# Patient Record
Sex: FEMALE | Race: WHITE | NOT HISPANIC OR LATINO | Employment: OTHER
[De-identification: names, ages, dates, MRNs, and addresses within clinical notes are randomized per-mention and may not be internally consistent; named-entity substitution may affect disease eponyms.]

---

## 2017-09-01 ENCOUNTER — HEALTH MAINTENANCE LETTER (OUTPATIENT)
Age: 72
End: 2017-09-01

## 2017-10-19 ENCOUNTER — OFFICE VISIT (OUTPATIENT)
Dept: CARDIOLOGY | Facility: CLINIC | Age: 72
End: 2017-10-19
Attending: INTERNAL MEDICINE
Payer: COMMERCIAL

## 2017-10-19 VITALS
HEIGHT: 61 IN | DIASTOLIC BLOOD PRESSURE: 80 MMHG | HEART RATE: 76 BPM | BODY MASS INDEX: 25.81 KG/M2 | WEIGHT: 136.7 LBS | SYSTOLIC BLOOD PRESSURE: 130 MMHG

## 2017-10-19 DIAGNOSIS — E78.2 MIXED HYPERLIPIDEMIA: ICD-10-CM

## 2017-10-19 LAB
ALT SERPL W P-5'-P-CCNC: <5 U/L (ref 5–30)
CHOLEST SERPL-MCNC: 198 MG/DL
HDLC SERPL-MCNC: 59 MG/DL
LDLC SERPL CALC-MCNC: 91 MG/DL
NONHDLC SERPL-MCNC: 139 MG/DL
TRIGL SERPL-MCNC: 238 MG/DL

## 2017-10-19 PROCEDURE — 84460 ALANINE AMINO (ALT) (SGPT): CPT | Performed by: INTERNAL MEDICINE

## 2017-10-19 PROCEDURE — 36415 COLL VENOUS BLD VENIPUNCTURE: CPT | Performed by: INTERNAL MEDICINE

## 2017-10-19 PROCEDURE — 99213 OFFICE O/P EST LOW 20 MIN: CPT | Performed by: INTERNAL MEDICINE

## 2017-10-19 PROCEDURE — 80061 LIPID PANEL: CPT | Performed by: INTERNAL MEDICINE

## 2017-10-19 NOTE — LETTER
10/19/2017    Meredith Silva MD  [address] [facility] [address]  [facility] [address]    RE: Sapna Mckeonmercy       Dear Colleague,    I had the pleasure of seeing Sapna Ibrahim in the [facility].    It was a pleasure to see Sapna Ibrahim in cardiology clinic.  She is a very pleasant, 72-year-old woman who I met in 10/2016 with hyperlipidemia.  Her LDL cholesterol was as high as 250 but had not been on cholesterol medication.  After I saw her, she agreed to start Crestor 20 mg a day, and fortunately her blood cholesterol has come down really nicely.  Her LDL when checked today was 91 with an HDL of 59.  She does have a little bit of hypertriglyceridemia at 238.  She is otherwise very active.  She still walks about 3 miles per day and has no symptoms of chest pain, tightness or shortness of breath.  Her blood pressure is under good control.  She smokes only remotely and is single, retired from [employer].  Today her daughter is with her as well as her delightful granddaughter.  Her daughter, interestingly, has multiple sclerosis and also has hyperlipidemia but has not been started on treatment.  She also has a grandson who has an autoimmune issue from her daughter.  Ms. Ibrahim herself does not have a known family history of early coronary artery disease, but she was adopted after her dad  of meningitis in his 20s.     Outpatient Encounter Prescriptions as of 10/19/2017   Medication Sig Dispense Refill     rosuvastatin (CRESTOR) 20 MG tablet Take 1 tablet (20 mg) by mouth daily 90 tablet 3     ibuprofen (ADVIL,MOTRIN) 400 MG tablet Take 1 tablet (400 mg) by mouth 3 times daily (with meals) (Patient taking differently: Take 400 mg by mouth every 8 hours as needed )       cetirizine (ZYRTEC) 10 MG tablet Take 1 tablet (10 mg) by mouth daily (Patient taking differently: Take 10 mg by mouth as needed ) 90 tablet prn     No facility-administered encounter medications on file as of  10/19/2017.       IMPRESSION, REPORT, PLAN:   1.  Hyperlipidemia, reasonably controlled on treatment.   2.  History of kidney stones.      DISCUSSION:  It was a pleasure to see Ms. Ibrahim in followup.  Clinically, she is doing really well without any concerning cardiac symptoms.  She denies chest pain or discomfort.  She is really active.  Her cholesterol looks good.  Her blood pressure is reasonable, so we discussed continuing to manage and follow her as we are doing.  She will follow up in a year.  We also discussed that her first-degree relatives really should have a screening cholesterol checked as well, and that includes her siblings as well as her children, given how high her cholesterol was before treatment.  It was a pleasure to see her.  Please do not hesitate to contact me with any questions or concerns.     Again, thank you for allowing me to participate in the care of your patient.      Sincerely,    Shaun Ritchie MD     [facility]

## 2017-10-19 NOTE — MR AVS SNAPSHOT
After Visit Summary   10/19/2017    Sapna Ibrahim    MRN: [id number]           Patient Information     Date Of Birth          1945        Visit Information        Provider Department      10/19/2017 12:45 PM March, Shaun Mario MD [facility] [place]        Today's Diagnoses     Mixed hyperlipidemia           Follow-ups after your visit        Additional Services     Follow-Up with Cardiologist                 Future tests that were ordered for you today     Open Future Orders        Priority Expected Expires Ordered    Lipid Profile Routine 10/19/2018 10/19/2018 10/19/2017    ALT Routine 10/19/2018 10/19/2018 10/19/2017    Follow-Up with Cardiologist Routine 10/19/2018 10/20/2018 10/19/2017            Who to contact     If you have questions or need follow up information about today's clinic visit or your schedule please contact [facility] [place] directly at [phone].  Normal or non-critical lab and imaging results will be communicated to you by MyChart, letter or phone within 4 business days after the clinic has received the results. If you do not hear from us within 7 days, please contact the clinic through Video Passportshart or phone. If you have a critical or abnormal lab result, we will notify you by phone as soon as possible.  Submit refill requests through Flipiture or call your pharmacy and they will forward the refill request to us. Please allow 3 business days for your refill to be completed.          Additional Information About Your Visit        MyChart Information     Flipiture gives you secure access to your electronic health record. If you see a primary care provider, you can also send messages to your care team and make appointments. If you have questions, please call your primary care clinic.  If you do not have a primary care provider, please call [phone] and they will assist you.        Care EveryWhere ID     This  "is your Care EveryWhere ID. This could be used by other organizations to access your [place] medical records  [id number]        Your Vitals Were     Pulse Height BMI (Body Mass Index)             76 1.556 m (5' 1.25\") 25.62 kg/m2          Blood Pressure from Last 3 Encounters:   10/19/17 138/70   10/27/16 140/88   10/24/16 150/80    Weight from Last 3 Encounters:   10/19/17 62 kg (136 lb 11.2 oz)   10/27/16 60.6 kg (133 lb 9.6 oz)   10/24/16 61.6 kg (135 lb 12.8 oz)              We Performed the Following     Follow-Up with Cardiologist          Today's Medication Changes          These changes are accurate as of: 10/19/17  1:26 PM.  If you have any questions, ask your nurse or doctor.               These medicines have changed or have updated prescriptions.        Dose/Directions    cetirizine 10 MG tablet   Commonly known as:  zyrTEC   This may have changed:    - when to take this  - reasons to take this   Used for:  Allergy, unspecified not elsewhere classified        Dose:  10 mg   Take 1 tablet (10 mg) by mouth daily   Quantity:  90 tablet   Refills:  prn       ibuprofen 400 MG tablet   Commonly known as:  ADVIL/MOTRIN   This may have changed:    - when to take this  - reasons to take this   Used for:  Right wrist pain        Dose:  400 mg   Take 1 tablet (400 mg) by mouth 3 times daily (with meals)   Refills:  0                Primary Care Provider Office Phone # Fax #    Meredith Silva [phone]       [facility] [address] [address]        Equal Access to Services     [facility] AH: Hadii kenneth burnett Soleonora, waaxda luqadaha, qaybta kaalmada robert, star evans . So [facility] [phone].    ATENCIÓN: Si habla español, tiene a oh disposición servicios gratuitos de asistencia lingüística. Llame al [phone].    We comply with applicable federal civil rights laws and [place] laws. We do not discriminate on the basis of race, color, " national origin, age, disability, sex, sexual orientation, or gender identity.            Thank you!     Thank you for choosing [facility] [place]  for your care. Our goal is always to provide you with excellent care. Hearing back from our patients is one way we can continue to improve our services. Please take a few minutes to complete the written survey that you may receive in the mail after your visit with us. Thank you!             Your Updated Medication List - Protect others around you: Learn how to safely use, store and throw away your medicines at www.disposemymeds.org.          This list is accurate as of: 10/19/17  1:26 PM.  Always use your most recent med list.                   Brand Name Dispense Instructions for use Diagnosis    cetirizine 10 MG tablet    zyrTEC    90 tablet    Take 1 tablet (10 mg) by mouth daily    Allergy, unspecified not elsewhere classified       ibuprofen 400 MG tablet    ADVIL/MOTRIN     Take 1 tablet (400 mg) by mouth 3 times daily (with meals)    Right wrist pain       rosuvastatin 20 MG tablet    CRESTOR    90 tablet    Take 1 tablet (20 mg) by mouth daily    Mixed hyperlipidemia

## 2017-10-19 NOTE — PROGRESS NOTES
HPI:     Please see dictated note    PAST MEDICAL HISTORY:  Past Medical History:   Diagnosis Date     Allergic rhinitis due to pollen      Other and unspecified hyperlipidemia     Hyperlipidemia     Renal disease     kidney stone       CURRENT MEDICATIONS:  Current Outpatient Prescriptions   Medication Sig Dispense Refill     rosuvastatin (CRESTOR) 20 MG tablet Take 1 tablet (20 mg) by mouth daily 90 tablet 3     ibuprofen (ADVIL,MOTRIN) 400 MG tablet Take 1 tablet (400 mg) by mouth 3 times daily (with meals) (Patient taking differently: Take 400 mg by mouth every 8 hours as needed )       cetirizine (ZYRTEC) 10 MG tablet Take 1 tablet (10 mg) by mouth daily (Patient taking differently: Take 10 mg by mouth as needed ) 90 tablet prn       PAST SURGICAL HISTORY:  Past Surgical History:   Procedure Laterality Date     C NONSPECIFIC PROCEDURE  1994    microvascular decompression of the right trigeminal nerve     C VAGINAL HYSTERECTOMY  1983    menorhagia; both ovaries remain     CATARACT IOL, RT/LT Bilateral      COLONOSCOPY  9/2014    hemorrhoids;      COMBINED CYSTOSCOPY, INSERT STENT URETER(S)  12/10/2013    Procedure: COMBINED CYSTOSCOPY, INSERT STENT URETER(S);;  Surgeon: Milton Rangel MD;  Location:  OR     EXTRACORPOREAL SHOCK WAVE LITHOTRIPSY (ESWL)  12/10/2013    Procedure: EXTRACORPOREAL SHOCK WAVE LITHOTRIPSY (ESWL);  RIGHT EXTRACORPOREAL SHOCK WAVE LITHOTRIPSY, CYSTOSCOPY WITH RIGHT STENT PLACEMENT, RIGHT RETROGRADE;  Surgeon: Milton Rangel MD;  Location:  OR     EXTRACORPOREAL SHOCK WAVE LITHOTRIPSY (ESWL)  5/13/2014    Procedure: EXTRACORPOREAL SHOCK WAVE LITHOTRIPSY (ESWL);  Surgeon: Milton Rangel MD;  Location:  OR     HC COLONOSCOPY THRU STOMA, DIAGNOSTIC  7/8/03    normal; diverticulosis     HC LAPAROSCOPY, SURGICAL; CHOLECYSTECTOMY  1996    open procedure     HC REMOVAL OF TONSILS,12+ Y/O      age 21     HCL PAP SMEAR  1999    normal     RELEASE CARPAL TUNNEL  12/2015     VASCULAR SURGERY  " 1994    microvascular decompression of trigeminal nerve (MVD)       ALLERGIES     Allergies   Allergen Reactions     Meperidine Hcl      sick to stomache     Percodan [Aspirin]        FAMILY HISTORY:  Family History   Problem Relation Age of Onset     Adopted: Yes     HEART DISEASE Mother      DIABETES Maternal Grandfather      HEART DISEASE Father       age 26; enlarged heart     DIABETES Paternal Aunt      HEART DISEASE Brother      CABG     HEART DISEASE Sister        SOCIAL HISTORY:  Social History     Social History     Marital status:      Spouse name: N/A     Number of children: N/A     Years of education: N/A     Social History Main Topics     Smoking status: Never Smoker     Smokeless tobacco: Never Used     Alcohol use 2.0 oz/week     4 Standard drinks or equivalent per week      Comment: 4/week     Drug use: No     Sexual activity: Yes     Other Topics Concern     Special Diet No     Exercise Yes     walking everyday     Social History Narrative       ROS:   Constitutional: No fever, chills, or sweats. No weight gain/loss   ENT: No visual disturbance, ear ache, epistaxis, sore throat  Allergies/Immunologic: Negative.   Respiratory: No cough, hemoptysia  Cardiovascular: As per HPI  GI: No nausea, vomiting, hematemesis, melena, or hematochezia  : No urinary frequency, dysuria, or hematuria  Integument: Negative  Psychiatric: Negative  Neuro: Negative  Endocrinology: Negative   Musculoskeletal: Negative    EXAM:  /80  Pulse 76  Ht 1.556 m (5' 1.25\")  Wt 62 kg (136 lb 11.2 oz)  BMI 25.62 kg/m2  In general, the patient is a pleasant female in no apparent distress.    HEENT: NC/AT.  PERRLA.  EOMI.  Sclerae white, not injected.  Nares clear.  Pharynx without erythema or exudate.  Dentition intact.    Neck:  No jugular venous distension.   Heart: RRR. Normal S1, S2 splits physiologically. No murmur, rub, click, or gallop.     Lungs: CTA.  No ronchi, wheezes, rales.    Abdomen: Soft, " nontender, nondistended.   Extremities: No clubbing, cyanosis, or edema.   Neurologic: Alert and oriented to person/place/time, normal speech, gait and affect  Skin: No petechiae, purpura or rash.    Labs:  LIPID RESULTS:  Lab Results   Component Value Date    CHOL 198 10/19/2017    HDL 59 10/19/2017    LDL 91 10/19/2017    TRIG 238 (H) 10/19/2017    CHOLHDLRATIO 5.9 (H) 10/13/2016    NHDL 139 (H) 10/19/2017       LIVER ENZYME RESULTS:  Lab Results   Component Value Date    AST 24 10/13/2016    ALT <5 (L) 10/19/2017       CBC RESULTS:  Lab Results   Component Value Date    WBC 7.4 10/13/2016    RBC 5.32 (A) 10/13/2016    HGB 16.2 (A) 10/13/2016    HCT 50.9 (A) 10/13/2016    MCV 95.7 10/13/2016    MCH 30.5 10/13/2016    MCHC 31.8 10/13/2016    RDW 12.2 10/13/2016     10/13/2016       BMP RESULTS:  Lab Results   Component Value Date     10/13/2016    POTASSIUM 3.9 10/13/2016    CHLORIDE 99 10/13/2016    CO2 26 10/13/2016    GLC 89 10/13/2016     03/16/2001    BUN 16 10/13/2016    BUN NOT APPLICABLE 10/13/2016    CR 0.91 10/13/2016    GFRESTIMATED 63 10/13/2016    FARRAH 9.6 10/13/2016        A1C RESULTS:  Lab Results   Component Value Date    A1C 5.4 10/15/2015       JANAE Ritchie MD     CC  Patient Care Team:  Meredith Silva MD as PCP - General  RAYRAY RITCHIE

## 2017-10-20 NOTE — PROGRESS NOTES
HISTORY OF PRESENT ILLNESS:  It was a pleasure to see Sapna Ibrahim in cardiology clinic.  She is a very pleasant, 72-year-old woman who I met in 10/2016 with hyperlipidemia.  Her LDL cholesterol was as high as 250 but had not been on cholesterol medication.  After I saw her, she agreed to start Crestor 20 mg a day, and fortunately her blood cholesterol has come down really nicely.  Her LDL when checked today was 91 with an HDL of 59.  She does have a little bit of hypertriglyceridemia at 238.  She is otherwise very active.  She still walks about 3 miles per day and has no symptoms of chest pain, tightness or shortness of breath.  Her blood pressure is under good control.  She smokes only remotely and is single, retired from [employer].  Today her daughter is with her as well as her delightful granddaughter.  Her daughter, interestingly, has multiple sclerosis and also has hyperlipidemia but has not been started on treatment.  She also has a grandson who has an autoimmune issue from her daughter.  Ms. Ibrahim herself does not have a known family history of early coronary artery disease, but she was adopted after her dad  of meningitis in his 20s.      IMPRESSION, REPORT, PLAN:   1.  Hyperlipidemia, reasonably controlled on treatment.   2.  History of kidney stones.      DISCUSSION:  It was a pleasure to see Ms. Ibrahim in followup.  Clinically, she is doing really well without any concerning cardiac symptoms.  She denies chest pain or discomfort.  She is really active.  Her cholesterol looks good.  Her blood pressure is reasonable, so we discussed continuing to manage and follow her as we are doing.  She will follow up in a year.  We also discussed that her first-degree relatives really should have a screening cholesterol checked as well, and that includes her siblings as well as her children, given how high her cholesterol was before treatment.  It was a pleasure to see her.  Please do not hesitate to  contact me with any questions or concerns.         RAYRAY MONTEJO MD             D: 10/19/2017 13:37   T: 10/20/2017 11:14   MT: LAUREN      Name:     ASHER SKELTON   MRN:      [id number]        Account:      [id number]   :      1945           Service Date: 10/19/2017      Document: [id number]

## 2017-11-06 DIAGNOSIS — E78.2 MIXED HYPERLIPIDEMIA: ICD-10-CM

## 2017-11-06 RX ORDER — ROSUVASTATIN CALCIUM 20 MG/1
20 TABLET, COATED ORAL DAILY
Qty: 90 TABLET | Refills: 3 | Status: SHIPPED | OUTPATIENT
Start: 2017-11-06 | End: 2018-10-16

## 2017-11-08 ENCOUNTER — HOSPITAL ENCOUNTER (OUTPATIENT)
Dept: MAMMOGRAPHY | Facility: CLINIC | Age: 72
Discharge: HOME | End: 2017-11-08
Attending: FAMILY MEDICINE | Admitting: FAMILY MEDICINE
Payer: MEDICARE

## 2017-11-08 DIAGNOSIS — Z12.31 VISIT FOR SCREENING MAMMOGRAM: ICD-10-CM

## 2017-11-08 PROCEDURE — 77063 BREAST TOMOSYNTHESIS BI: CPT

## 2017-11-08 PROCEDURE — G0202 SCR MAMMO BI INCL CAD: HCPCS

## 2018-03-09 ENCOUNTER — OFFICE VISIT (OUTPATIENT)
Dept: FAMILY MEDICINE | Facility: CLINIC | Age: 73
End: 2018-03-09

## 2018-03-09 VITALS
WEIGHT: 134.6 LBS | HEIGHT: 62 IN | SYSTOLIC BLOOD PRESSURE: 124 MMHG | DIASTOLIC BLOOD PRESSURE: 70 MMHG | TEMPERATURE: 97.7 F | HEART RATE: 72 BPM | OXYGEN SATURATION: 98 % | BODY MASS INDEX: 24.77 KG/M2

## 2018-03-09 DIAGNOSIS — Z00.00 ROUTINE HISTORY AND PHYSICAL EXAMINATION OF ADULT: Primary | ICD-10-CM

## 2018-03-09 LAB
ERYTHROCYTE [DISTWIDTH] IN BLOOD BY AUTOMATED COUNT: 12.8 %
HCT VFR BLD AUTO: 48.7 % (ref 35–47)
HEMOGLOBIN: 16.3 G/DL (ref 11.7–15.7)
MCH RBC QN AUTO: 33.5 PG (ref 26–33)
MCHC RBC AUTO-ENTMCNC: 33.5 G/DL (ref 31–36)
MCV RBC AUTO: 91.8 FL (ref 78–100)
PLATELET COUNT - QUEST: 279 10^9/L (ref 150–375)
RBC # BLD AUTO: 5.31 10*12/L (ref 3.8–5.2)
WBC # BLD AUTO: 8.4 10*9/L (ref 4–11)

## 2018-03-09 PROCEDURE — 82607 VITAMIN B-12: CPT | Mod: 90 | Performed by: FAMILY MEDICINE

## 2018-03-09 PROCEDURE — 84443 ASSAY THYROID STIM HORMONE: CPT | Mod: 90 | Performed by: FAMILY MEDICINE

## 2018-03-09 PROCEDURE — 80048 BASIC METABOLIC PNL TOTAL CA: CPT | Mod: 90 | Performed by: FAMILY MEDICINE

## 2018-03-09 PROCEDURE — 85027 COMPLETE CBC AUTOMATED: CPT | Performed by: FAMILY MEDICINE

## 2018-03-09 PROCEDURE — 99397 PER PM REEVAL EST PAT 65+ YR: CPT | Performed by: FAMILY MEDICINE

## 2018-03-09 PROCEDURE — 36415 COLL VENOUS BLD VENIPUNCTURE: CPT | Performed by: FAMILY MEDICINE

## 2018-03-09 NOTE — MR AVS SNAPSHOT
"              After Visit Summary   3/9/2018    Sapna Ibrahim    MRN: [id number]           Patient Information     Date Of Birth          1945        Visit Information        Provider Department      3/9/2018 10:00 AM Meredith Silva MD [facility]        Today's Diagnoses     Routine history and physical examination of adult    -  1      Care Instructions    Vitamin D3: 1000 IU daily          Follow-ups after your visit        Who to contact     If you have questions or need follow up information about today's clinic visit or your schedule please contact [facility] directly at [phone].  Normal or non-critical lab and imaging results will be communicated to you by IgnitionOnehart, letter or phone within 4 business days after the clinic has received the results. If you do not hear from us within 7 days, please contact the clinic through IgnitionOnehart or phone. If you have a critical or abnormal lab result, we will notify you by phone as soon as possible.  Submit refill requests through Expan or call your pharmacy and they will forward the refill request to us. Please allow 3 business days for your refill to be completed.          Additional Information About Your Visit        MyChart Information     Expan gives you secure access to your electronic health record. If you see a primary care provider, you can also send messages to your care team and make appointments. If you have questions, please call your primary care clinic.  If you do not have a primary care provider, please call [phone] and they will assist you.        Care EveryWhere ID     This is your Care EveryWhere ID. This could be used by other organizations to access your [place] medical records  [id number]        Your Vitals Were     Pulse Temperature Height Pulse Oximetry BMI (Body Mass Index)       72 97.7  F (36.5  C) (Oral) 1.575 m (5' 2\") 98% 24.62 kg/m2        Blood Pressure from Last 3 " Encounters:   03/09/18 124/70   10/19/17 130/80   10/27/16 140/88    Weight from Last 3 Encounters:   03/09/18 61.1 kg (134 lb 9.6 oz)   10/19/17 62 kg (136 lb 11.2 oz)   10/27/16 60.6 kg (133 lb 9.6 oz)              We Performed the Following     BASIC METABOLIC PANEL ([facility])     HEMOGRAM/PLATELET ([facility])     TSH with free T4 reflex ([facility])     VENOUS COLLECTION     VITAMIN B12 ([facility])          Today's Medication Changes          These changes are accurate as of 3/9/18 11:59 PM.  If you have any questions, ask your nurse or doctor.               These medicines have changed or have updated prescriptions.        Dose/Directions    cetirizine 10 MG tablet   Commonly known as:  zyrTEC   This may have changed:    - when to take this  - reasons to take this   Used for:  Allergy, unspecified not elsewhere classified        Dose:  10 mg   Take 1 tablet (10 mg) by mouth daily   Quantity:  90 tablet   Refills:  prn       ibuprofen 400 MG tablet   Commonly known as:  ADVIL/MOTRIN   This may have changed:    - when to take this  - reasons to take this   Used for:  Right wrist pain        Dose:  400 mg   Take 1 tablet (400 mg) by mouth 3 times daily (with meals)   Refills:  0                Primary Care Provider Office Phone # Fax #    Meredith Silva [phone] [address] [address]        Equal Access to Services     [facility] AH: Hadii kenneth sancheso Soleonora, waaxda luqadaha, qaybta kaalmada adeyasmeenyada, star evans . So [facility] [phone].    ATENCIÓN: Si habla español, tiene a oh disposición servicios gratuitos de asistencia lingüística. Kelsy barber [phone].    We comply with applicable federal civil rights laws and [place] laws. We do not discriminate on the basis of race, color, national origin, age, disability, sex, sexual orientation, or gender identity.            Thank you!     Thank you for choosing [facility]  for your care. Our goal is always to provide you with excellent care. Hearing back from our patients is one way we can continue to improve our services. Please take a few minutes to complete the written survey that you may receive in the mail after your visit with us. Thank you!             Your Updated Medication List - Protect others around you: Learn how to safely use, store and throw away your medicines at www.disposemymeds.org.          This list is accurate as of 3/9/18 11:59 PM.  Always use your most recent med list.                   Brand Name Dispense Instructions for use Diagnosis    cetirizine 10 MG tablet    zyrTEC    90 tablet    Take 1 tablet (10 mg) by mouth daily    Allergy, unspecified not elsewhere classified       ibuprofen 400 MG tablet    ADVIL/MOTRIN     Take 1 tablet (400 mg) by mouth 3 times daily (with meals)    Right wrist pain       rosuvastatin 20 MG tablet    CRESTOR    90 tablet    Take 1 tablet (20 mg) by mouth daily    Mixed hyperlipidemia

## 2018-03-09 NOTE — NURSING NOTE
Sapna is here for CPX no pap    Patient is here for a full physical exam.    Pre-Visit Screening :  Immunizations : up to date  Colon Screening : is up to date 2017  Mammogram: UTD  Asthma Action Test/Plan : NA  PHQ9 :  None  GAD7 :  None  Patient's  BMI Body mass index is 24.62 kg/(m^2).  Questioned patient about current smoking habits.  Pt. has never smoked.  OK to leave a detailed voice message regarding today's visit Yes, phone # [phone]      ETOH screening:  Questions:  1-How often do you have a drink containing alcohol?                              3 times per week(s)  2-How many drinks containing alcohol do you have on a typical day when you are         Drinking?                              2   3- How often do you have 5 or more drinks on one occasion?

## 2018-03-09 NOTE — PROGRESS NOTES
Chief Complaint: Sapna Ibrahim is an 72 year old woman who presents for preventive health visit.      Besides routine health maintenance, she has no other health concerns today .   Neurodermatitis- excoriations upper back      Healthy Habits:  Do you get at least three servings of calcium containing foods daily (dairy, green leafy vegetables, etc.)? yes  Outside of work or daily activities, how many days per week do you exercise for 30 minutes or longer? Walks three miles a day  Encouraged to take a D supplement  Have you had an eye exam in the past two years? yes  Do you see a dentist twice per year? no    PHQ-2  Over the last two weeks- Have you been bothered by little interest or pleasure in doing things?  No  Over the last two weeks- Have you been feeling down, depressed, or hopeless?  No      Advanced directive:encouraged      COGNITIVE SCREEN  1) Repeat 3 items (Banana, Sunrise, Chair)    2) Clock draw: NORMAL  3) 3 item recall: Recalls 3 objects  Results: 3 items recalled: COGNITIVE IMPAIRMENT LESS LIKELY  2/3    Mini-CogTM Copyright CYDNEY Dumont. Licensed by the author for use in [facility]; reprinted with permission (negrito@.[facility]). All rights reserved.      Sores on back  Adopted  Biological dad  at age 26  Biological mother  age 84  Three biological siblings        Social History   Substance Use Topics     Smoking status: Never Smoker     Smokeless tobacco: Never Used     Alcohol use 2.0 oz/week     4 Standard drinks or equivalent per week      Comment: 4/week     The patient does not drink >3 drinks per day nor >7 drinks per week.    Reviewed orders with patient.  Reviewed health maintenance and updated orders accordingly - Yes      History of abnormal Pap smear: NO - age 65 - see link Cervical Cytology Screening Guidelines  All Histories reviewed and updated in Epic.      ROS:  C: NEGATIVE for fever, chills, change in weight  I: NEGATIVE for worrisome rashes, moles or lesions  E:  "NEGATIVE for vision changes or irritation  ENT: NEGATIVE for mouth and throat problems  Hearing loss in right ear after surgery for trigeminal neuralgia at [facility]  R: NEGATIVE for significant cough or SOB  B: NEGATIVE for masses, tenderness or discharge  CV: NEGATIVE for chest pain, palpitations or peripheral edema  GI: NEGATIVE for nausea, abdominal pain, heartburn, or change in bowel habits  : NEGATIVE for unusual urinary or vaginal symptoms. No vaginal bleeding.  M: NEGATIVE for significant arthralgias   Back pain since 2008- normally active  N: NEGATIVE for weakness, dizziness or paresthesias  P: NEGATIVE for changes in mood or affect        OBJECTIVE:  /70 (BP Location: Right arm, Patient Position: Chair, Cuff Size: Adult Regular)  Pulse 72  Temp 97.7  F (36.5  C) (Oral)  Ht 1.575 m (5' 2\")  Wt 61.1 kg (134 lb 9.6 oz)  SpO2 98%  BMI 24.62 kg/m2  General appearance: Healthy    Skin: Normal. No atypical appearing moles on inspection of trunk and extremities.    External ears  and canals clear bilaterally. TM's normal bilaterally. Nose normal without lesions or discharge. Oropharynx normal. Neck supple without palpable adenopathy.    Breasts are symmetric.  No dominant, discrete, fixed  or suspicious masses are noted.  No skin or nipple changes or axillary nodes.      Regular rate and  rhythm. S1 and S2 normal, no murmurs, clicks, gallops or rubs. No edema or JVD. Chest is clear; no wheezes or rales.    The abdomen is soft without tenderness, guarding, mass or organomegaly. Bowel sounds are normal. No CVA tenderness or inguinal adenopathy noted.    Pelvic:  Deferred.    Rectal exam: deferred    Extremities: negative.      COUNSELING:  Reviewed preventive health counseling, as reflected in patient instructions       Regular exercise       Healthy diet/nutrition       Osteoporosis Prevention/Bone Health       Advance Care Planning    ATP III Guidelines  ICSI Preventive " Guidelines    ASSESSMENT/PLAN:  (Z00.00) Routine history and physical examination of adult  (primary encounter diagnosis)  Comment:   Plan: BASIC METABOLIC PANEL (QUEST), VITAMIN B12         (QUEST), TSH with free T4 reflex (QUEST),         VENOUS COLLECTION, HEMOGRAM/PLATELET ([facility])

## 2018-03-10 LAB
BUN SERPL-MCNC: 16 MG/DL (ref 7–25)
BUN/CREATININE RATIO: NORMAL (CALC) (ref 6–22)
CALCIUM SERPL-MCNC: 9.8 MG/DL (ref 8.6–10.4)
CHLORIDE SERPLBLD-SCNC: 101 MMOL/L (ref 98–110)
CO2 SERPL-SCNC: 26 MMOL/L (ref 20–31)
CREAT SERPL-MCNC: 0.74 MG/DL (ref 0.6–0.93)
EGFR AFRICAN AMERICAN - QUEST: 94 ML/MIN/1.73M2
GFR SERPL CREATININE-BSD FRML MDRD: 81 ML/MIN/1.73M2
GLUCOSE - QUEST: 89 MG/DL (ref 65–99)
POTASSIUM SERPL-SCNC: 4.1 MMOL/L (ref 3.5–5.3)
SODIUM SERPL-SCNC: 137 MMOL/L (ref 135–146)
TSH SERPL-ACNC: 2.5 MIU/L (ref 0.4–4.5)
VIT B12 SERPL-MCNC: 500 PG/ML (ref 200–1100)

## 2018-08-01 ENCOUNTER — TELEPHONE (OUTPATIENT)
Dept: FAMILY MEDICINE | Facility: CLINIC | Age: 73
End: 2018-08-01

## 2018-08-01 DIAGNOSIS — J30.1 CHRONIC SEASONAL ALLERGIC RHINITIS DUE TO POLLEN: Primary | ICD-10-CM

## 2018-08-01 DIAGNOSIS — J30.1 CHRONIC ALLERGIC RHINITIS DUE TO POLLEN, UNSPECIFIED SEASONALITY: ICD-10-CM

## 2018-08-01 RX ORDER — CETIRIZINE HYDROCHLORIDE 10 MG/1
TABLET ORAL
Qty: 90 TABLET | Refills: 3 | Status: SHIPPED | OUTPATIENT
Start: 2018-08-01 | End: 2019-08-27

## 2018-08-01 RX ORDER — CETIRIZINE HYDROCHLORIDE 10 MG/1
10 TABLET ORAL PRN
Qty: 90 TABLET | Refills: 3 | Status: SHIPPED | OUTPATIENT
Start: 2018-08-01 | End: 2019-04-03

## 2018-08-01 NOTE — TELEPHONE ENCOUNTER
Pending Prescriptions:                       Disp   Refills    cetirizine (ZYRTEC) 10 MG tablet [Pharmac*90 tab*             Sig: TAKE ONE TABLET BY MOUTH EVERY EVENING    Pt last refill was 2015  Pt last px was 3-2018  Please change qty and fax  Eves  [phone] (home) NONE (work)

## 2018-08-01 NOTE — TELEPHONE ENCOUNTER
Sapna is calling asking for her allergy meds to be filled.  She states you do this for her every year in the fall and it's almost fall so please send to [facility]     See refill from Kayce    Patient's phone #[phone]

## 2018-10-16 DIAGNOSIS — E78.2 MIXED HYPERLIPIDEMIA: ICD-10-CM

## 2018-10-16 RX ORDER — ROSUVASTATIN CALCIUM 20 MG/1
20 TABLET, COATED ORAL DAILY
Qty: 90 TABLET | Refills: 1 | Status: SHIPPED | OUTPATIENT
Start: 2018-10-16 | End: 2019-04-03

## 2019-01-17 DIAGNOSIS — E78.2 MIXED HYPERLIPIDEMIA: Primary | ICD-10-CM

## 2019-01-24 ENCOUNTER — OFFICE VISIT (OUTPATIENT)
Dept: CARDIOLOGY | Facility: CLINIC | Age: 74
End: 2019-01-24
Payer: COMMERCIAL

## 2019-01-24 VITALS
HEIGHT: 62 IN | DIASTOLIC BLOOD PRESSURE: 73 MMHG | WEIGHT: 138 LBS | SYSTOLIC BLOOD PRESSURE: 117 MMHG | HEART RATE: 85 BPM | BODY MASS INDEX: 25.4 KG/M2

## 2019-01-24 DIAGNOSIS — E78.2 MIXED HYPERLIPIDEMIA: ICD-10-CM

## 2019-01-24 DIAGNOSIS — E78.2 MIXED HYPERLIPIDEMIA: Primary | ICD-10-CM

## 2019-01-24 LAB
ALT SERPL W P-5'-P-CCNC: <5 U/L (ref 5–30)
CHOLEST SERPL-MCNC: 182 MG/DL
HDLC SERPL-MCNC: 57 MG/DL
LDLC SERPL CALC-MCNC: 80 MG/DL
NONHDLC SERPL-MCNC: 125 MG/DL
TRIGL SERPL-MCNC: 227 MG/DL

## 2019-01-24 PROCEDURE — 36415 COLL VENOUS BLD VENIPUNCTURE: CPT | Performed by: INTERNAL MEDICINE

## 2019-01-24 PROCEDURE — 99213 OFFICE O/P EST LOW 20 MIN: CPT | Performed by: INTERNAL MEDICINE

## 2019-01-24 PROCEDURE — 80061 LIPID PANEL: CPT | Performed by: INTERNAL MEDICINE

## 2019-01-24 PROCEDURE — 84460 ALANINE AMINO (ALT) (SGPT): CPT | Performed by: INTERNAL MEDICINE

## 2019-01-24 RX ORDER — ROSUVASTATIN CALCIUM 20 MG/1
20 TABLET, COATED ORAL DAILY
Qty: 90 TABLET | Refills: 3 | Status: SHIPPED | OUTPATIENT
Start: 2019-01-24 | End: 2020-01-08

## 2019-01-24 ASSESSMENT — MIFFLIN-ST. JEOR: SCORE: 1084.21

## 2019-01-24 NOTE — LETTER
1/24/2019    Meredith Silva MD  [address] [facility] [address]  [facility] [address]    RE: Sapna Ibrahim       Dear Colleague,    I had the pleasure of seeing Sapna Ibrahim in the [facility].    HPI:    Please see dictated note    PAST MEDICAL HISTORY:  Past Medical History:   Diagnosis Date     Allergic rhinitis due to pollen      Other and unspecified hyperlipidemia     Hyperlipidemia     Renal disease     kidney stone       CURRENT MEDICATIONS:  Current Outpatient Medications   Medication Sig Dispense Refill     cetirizine (ZYRTEC) 10 MG tablet Take 1 tablet (10 mg) by mouth as needed 90 tablet 3     ibuprofen (ADVIL,MOTRIN) 400 MG tablet Take 1 tablet (400 mg) by mouth 3 times daily (with meals) (Patient taking differently: Take 400 mg by mouth every 8 hours as needed )       rosuvastatin (CRESTOR) 20 MG tablet Take 1 tablet (20 mg) by mouth daily 90 tablet 1     cetirizine (ZYRTEC) 10 MG tablet TAKE ONE TABLET BY MOUTH EVERY EVENING (Patient taking differently: TAKE ONE TABLET BY MOUTH EVERY EVENING PRN) 90 tablet 3       PAST SURGICAL HISTORY:  Past Surgical History:   Procedure Laterality Date     C NONSPECIFIC PROCEDURE  1994    microvascular decompression of the right trigeminal nerve     C VAGINAL HYSTERECTOMY  1983    menorhagia; both ovaries remain     CATARACT IOL, RT/LT Bilateral      COLONOSCOPY  9/2014    hemorrhoids;      COMBINED CYSTOSCOPY, INSERT STENT URETER(S)  12/10/2013    Procedure: COMBINED CYSTOSCOPY, INSERT STENT URETER(S);;  Surgeon: Milton Rangel MD;  Location:  OR     EXTRACORPOREAL SHOCK WAVE LITHOTRIPSY (ESWL)  12/10/2013    Procedure: EXTRACORPOREAL SHOCK WAVE LITHOTRIPSY (ESWL);  RIGHT EXTRACORPOREAL SHOCK WAVE LITHOTRIPSY, CYSTOSCOPY WITH RIGHT STENT PLACEMENT, RIGHT RETROGRADE;  Surgeon: Milton Rangel MD;  Location:  OR     EXTRACORPOREAL SHOCK WAVE LITHOTRIPSY (ESWL)  5/13/2014    Procedure: EXTRACORPOREAL SHOCK WAVE LITHOTRIPSY  (ESWL);  Surgeon: Milton Rangel MD;  Location: [facility] OR     HC COLONOSCOPY THRU STOMA, DIAGNOSTIC  03    normal; diverticulosis     HC LAPAROSCOPY, SURGICAL; CHOLECYSTECTOMY      open procedure     HC REMOVAL OF TONSILS,12+ Y/O      age 21     HCL PAP SMEAR      normal     RELEASE CARPAL TUNNEL  2015     VASCULAR SURGERY      microvascular decompression of trigeminal nerve (MVD)       ALLERGIES     Allergies   Allergen Reactions     Meperidine Hcl      sick to stomache     Percodan [Aspirin]        FAMILY HISTORY:  Family History   Adopted: Yes   Problem Relation Age of Onset     Heart Disease Mother      Diabetes Maternal Grandfather      Heart Disease Father          age 26; enlarged heart     Diabetes Paternal Aunt      Heart Disease Brother         CABG     Heart Disease Sister        SOCIAL HISTORY:  Social History     Socioeconomic History     Marital status:      Spouse name: None     Number of children: None     Years of education: None     Highest education level: None   Social Needs     Financial resource strain: None     Food insecurity - worry: None     Food insecurity - inability: None     Transportation needs - medical: None     Transportation needs - non-medical: None   Occupational History     None   Tobacco Use     Smoking status: Never Smoker     Smokeless tobacco: Never Used   Substance and Sexual Activity     Alcohol use: Yes     Alcohol/week: 2.0 oz     Types: 4 Standard drinks or equivalent per week     Comment: 4/week     Drug use: No     Sexual activity: Yes   Other Topics Concern      Service Not Asked     Blood Transfusions Not Asked     Caffeine Concern Not Asked     Occupational Exposure Not Asked     Hobby Hazards Not Asked     Sleep Concern Not Asked     Stress Concern Not Asked     Weight Concern Not Asked     Special Diet No     Back Care Not Asked     Exercise Yes     Comment: walking everyday     Bike Helmet Not Asked     Seat Belt Not Asked      "Self-Exams Not Asked     Parent/sibling w/ CABG, MI or angioplasty before 65F 55M? Not Asked   Social History Narrative     None       ROS:   Constitutional: No fever, chills, or sweats. No weight gain/loss   ENT: No visual disturbance, ear ache, epistaxis, sore throat  Allergies/Immunologic: Negative.   Respiratory: No cough, hemoptysia  Cardiovascular: As per HPI  GI: No nausea, vomiting, hematemesis, melena, or hematochezia  : No urinary frequency, dysuria, or hematuria  Integument: Negative  Psychiatric: Negative  Neuro: Negative  Endocrinology: Negative   Musculoskeletal: Negative    EXAM:  /73   Pulse 85   Ht 1.575 m (5' 2\")   Wt 62.6 kg (138 lb)   BMI 25.24 kg/m     In general, the patient is a pleasant female in no apparent distress.    HEENT: NC/AT.  PERRLA.  EOMI.  Sclerae white, not injected.  Nares clear.  Pharynx without erythema or exudate.  Dentition intact.    Neck: No adenopathy.  No thyromegaly. Carotids +4/4 bilaterally without bruits.  No jugular venous distension.   Heart: RRR. Normal S1, S2 splits physiologically. No murmur, rub, click, or gallop. The PMI is in the 5th ICS in the midclavicular line. There is no heave.    Lungs: CTA.  No ronchi, wheezes, rales.  No dullness to percussion.   Abdomen: Soft, nontender, nondistended. No organomegaly.  No bruits.   Extremities: No clubbing, cyanosis, or edema.  The pulses are +4/4 at the radial, brachial, femoral, popliteal, DP, and PT sites bilaterally.  No bruits are noted.  Neurologic: Alert and oriented to person/place/time, normal speech, gait and affect  Skin: No petechiae, purpura or rash.    Labs:  LIPID RESULTS:  Lab Results   Component Value Date    CHOL 182 01/24/2019    HDL 57 01/24/2019    LDL 80 01/24/2019    TRIG 227 (H) 01/24/2019    CHOLHDLRATIO 5.9 (H) 10/13/2016    NHDL 125 01/24/2019       LIVER ENZYME RESULTS:  Lab Results   Component Value Date    AST 24 10/13/2016    ALT <5 (L) 01/24/2019       CBC RESULTS:  Lab " Results   Component Value Date    WBC 8.4 2018    RBC 5.31 (A) 2018    HGB 16.3 (A) 2018    HCT 48.7 (A) 2018    MCV 91.8 2018    MCH 33.5 (A) 2018    MCHC 33.5 2018    RDW 12.8 2018     2018       BMP RESULTS:  Lab Results   Component Value Date     2018    POTASSIUM 4.1 2018    CHLORIDE 101 2018    CO2 26 2018    GLC 89 2018     2001    BUN 16 2018    BUN NOT APPLICABLE 2018    CR 0.74 2018    GFRESTIMATED 81 2018    FARRAH 9.8 2018        A1C RESULTS:  Lab Results   Component Value Date    A1C 5.4 10/15/2015       JANAE Ritchie MD     CC  Patient Care Team:  Lia Jones MD as PCP - General  LIA JONES    Service Date: 2019      HISTORY OF PRESENT ILLNESS:  Ms. Ibrahim is a pleasant 73-year-old woman, who I met in 2016 for hyperlipidemia with an LDL up to 250.  We put her on Crestor and fortunately her cholesterol came down nicely.  She has no history of coronary artery disease and no family history of early coronary artery disease, but she was adopted after her dad  of meningitis in his 20s, so we do not know complete details.  Over the course of the past year, she has been doing well without any concerning cardiovascular symptoms.  During the summer and spring, she walks daily and is quite active, but during the winter only does the elliptical and it sounds like not consistently.  Her triglycerides are reflective of that at 227.  Her LDL, however, remains 80 with an HDL of 57 and normal liver function tests.      IMPRESSION/PLAN:   1.  Hyperlipidemia, controlled on treatment.   2.  Hypertriglyceridemia.   3.  History of kidney stones.      DISCUSSION:  It was a pleasure to see Ms. Ibrahim in followup.  Clinically, she is doing well from a cardiovascular standpoint with no concerning symptoms.  Discussed getting active again even in the winter - if she  can do some things at home, that would help her triglycerides.  Otherwise would keep her medication regimen as is and plan to see her back in a year or sooner if there are any issues in the interim.  She understands and is in agreement with the plan as outlined.  All questions were answered.      Please do not hesitate to contact me with any questions or concerns.         RAYRAY MONTEJO MD             D: 2019   T: 2019   MT: al      Name:     ASHER SKELTON   MRN:      [id number]        Account:      [id number]   :      1945           Service Date: 2019      Document: [id number]       Thank you for allowing me to participate in the care of your patient.      Sincerely,     Rayray Montejo MD     [facility]    cc:   Meredith Silva MD  [address] [place] [address]

## 2019-01-24 NOTE — LETTER
2019      Meredith Silva MD  [address] [facility] [address]  [place] [address]      RE: Asher Ibrahim       Dear Colleague,    I had the pleasure of seeing Asher Ibrahim in the [facility].    Service Date: 2019      HISTORY OF PRESENT ILLNESS:  Ms. Ibrahim is a pleasant 73-year-old woman, who I met in 2016 for hyperlipidemia with an LDL up to 250.  We put her on Crestor and fortunately her cholesterol came down nicely.  She has no history of coronary artery disease and no family history of early coronary artery disease, but she was adopted after her dad  of meningitis in his 20s, so we do not know complete details.  Over the course of the past year, she has been doing well without any concerning cardiovascular symptoms.  During the summer and spring, she walks daily and is quite active, but during the winter only does the elliptical and it sounds like not consistently.  Her triglycerides are reflective of that at 227.  Her LDL, however, remains 80 with an HDL of 57 and normal liver function tests.      IMPRESSION/PLAN:   1.  Hyperlipidemia, controlled on treatment.   2.  Hypertriglyceridemia.   3.  History of kidney stones.      DISCUSSION:  It was a pleasure to see Ms. Ibrahim in followup.  Clinically, she is doing well from a cardiovascular standpoint with no concerning symptoms.  Discussed getting active again even in the winter - if she can do some things at home, that would help her triglycerides.  Otherwise would keep her medication regimen as is and plan to see her back in a year or sooner if there are any issues in the interim.  She understands and is in agreement with the plan as outlined.  All questions were answered.      Please do not hesitate to contact me with any questions or concerns.         RAYRAY MONTEJO MD             D: 2019   T: 2019   MT: al      Name:     ASHER IBRAHIM   MRN:      [id number]        Account:      [id number]    :      1945           Service Date: 2019      Document: [id number]         Outpatient Encounter Medications as of 2019   Medication Sig Dispense Refill     cetirizine (ZYRTEC) 10 MG tablet Take 1 tablet (10 mg) by mouth as needed 90 tablet 3     ibuprofen (ADVIL,MOTRIN) 400 MG tablet Take 1 tablet (400 mg) by mouth 3 times daily (with meals) (Patient taking differently: Take 400 mg by mouth every 8 hours as needed )       rosuvastatin (CRESTOR) 20 MG tablet Take 1 tablet (20 mg) by mouth daily 90 tablet 3     rosuvastatin (CRESTOR) 20 MG tablet Take 1 tablet (20 mg) by mouth daily 90 tablet 1     cetirizine (ZYRTEC) 10 MG tablet TAKE ONE TABLET BY MOUTH EVERY EVENING (Patient taking differently: TAKE ONE TABLET BY MOUTH EVERY EVENING PRN) 90 tablet 3     No facility-administered encounter medications on file as of 2019.        Again, thank you for allowing me to participate in the care of your patient.      Sincerely,    Shaun Ritchie MD     [facility]

## 2019-01-24 NOTE — PROGRESS NOTES
HPI:    Please see dictated note    PAST MEDICAL HISTORY:  Past Medical History:   Diagnosis Date     Allergic rhinitis due to pollen      Other and unspecified hyperlipidemia     Hyperlipidemia     Renal disease     kidney stone       CURRENT MEDICATIONS:  Current Outpatient Medications   Medication Sig Dispense Refill     cetirizine (ZYRTEC) 10 MG tablet Take 1 tablet (10 mg) by mouth as needed 90 tablet 3     ibuprofen (ADVIL,MOTRIN) 400 MG tablet Take 1 tablet (400 mg) by mouth 3 times daily (with meals) (Patient taking differently: Take 400 mg by mouth every 8 hours as needed )       rosuvastatin (CRESTOR) 20 MG tablet Take 1 tablet (20 mg) by mouth daily 90 tablet 1     cetirizine (ZYRTEC) 10 MG tablet TAKE ONE TABLET BY MOUTH EVERY EVENING (Patient taking differently: TAKE ONE TABLET BY MOUTH EVERY EVENING PRN) 90 tablet 3       PAST SURGICAL HISTORY:  Past Surgical History:   Procedure Laterality Date     C NONSPECIFIC PROCEDURE  1994    microvascular decompression of the right trigeminal nerve     C VAGINAL HYSTERECTOMY  1983    menorhagia; both ovaries remain     CATARACT IOL, RT/LT Bilateral      COLONOSCOPY  9/2014    hemorrhoids;      COMBINED CYSTOSCOPY, INSERT STENT URETER(S)  12/10/2013    Procedure: COMBINED CYSTOSCOPY, INSERT STENT URETER(S);;  Surgeon: Milton Rangel MD;  Location:  OR     EXTRACORPOREAL SHOCK WAVE LITHOTRIPSY (ESWL)  12/10/2013    Procedure: EXTRACORPOREAL SHOCK WAVE LITHOTRIPSY (ESWL);  RIGHT EXTRACORPOREAL SHOCK WAVE LITHOTRIPSY, CYSTOSCOPY WITH RIGHT STENT PLACEMENT, RIGHT RETROGRADE;  Surgeon: Milton Rangel MD;  Location:  OR     EXTRACORPOREAL SHOCK WAVE LITHOTRIPSY (ESWL)  5/13/2014    Procedure: EXTRACORPOREAL SHOCK WAVE LITHOTRIPSY (ESWL);  Surgeon: Milton Rangel MD;  Location:  OR     HC COLONOSCOPY THRU STOMA, DIAGNOSTIC  7/8/03    normal; diverticulosis     HC LAPAROSCOPY, SURGICAL; CHOLECYSTECTOMY  1996    open procedure     HC REMOVAL OF TONSILS,12+ Y/O       age 21     HCL PAP SMEAR      normal     RELEASE CARPAL TUNNEL  2015     VASCULAR SURGERY      microvascular decompression of trigeminal nerve (MVD)       ALLERGIES     Allergies   Allergen Reactions     Meperidine Hcl      sick to stomache     Percodan [Aspirin]        FAMILY HISTORY:  Family History   Adopted: Yes   Problem Relation Age of Onset     Heart Disease Mother      Diabetes Maternal Grandfather      Heart Disease Father          age 26; enlarged heart     Diabetes Paternal Aunt      Heart Disease Brother         CABG     Heart Disease Sister        SOCIAL HISTORY:  Social History     Socioeconomic History     Marital status:      Spouse name: None     Number of children: None     Years of education: None     Highest education level: None   Social Needs     Financial resource strain: None     Food insecurity - worry: None     Food insecurity - inability: None     Transportation needs - medical: None     Transportation needs - non-medical: None   Occupational History     None   Tobacco Use     Smoking status: Never Smoker     Smokeless tobacco: Never Used   Substance and Sexual Activity     Alcohol use: Yes     Alcohol/week: 2.0 oz     Types: 4 Standard drinks or equivalent per week     Comment: 4/week     Drug use: No     Sexual activity: Yes   Other Topics Concern      Service Not Asked     Blood Transfusions Not Asked     Caffeine Concern Not Asked     Occupational Exposure Not Asked     Hobby Hazards Not Asked     Sleep Concern Not Asked     Stress Concern Not Asked     Weight Concern Not Asked     Special Diet No     Back Care Not Asked     Exercise Yes     Comment: walking everyday     Bike Helmet Not Asked     Seat Belt Not Asked     Self-Exams Not Asked     Parent/sibling w/ CABG, MI or angioplasty before 65F 55M? Not Asked   Social History Narrative     None       ROS:   Constitutional: No fever, chills, or sweats. No weight gain/loss   ENT: No visual disturbance,  "ear ache, epistaxis, sore throat  Allergies/Immunologic: Negative.   Respiratory: No cough, hemoptysia  Cardiovascular: As per HPI  GI: No nausea, vomiting, hematemesis, melena, or hematochezia  : No urinary frequency, dysuria, or hematuria  Integument: Negative  Psychiatric: Negative  Neuro: Negative  Endocrinology: Negative   Musculoskeletal: Negative    EXAM:  /73   Pulse 85   Ht 1.575 m (5' 2\")   Wt 62.6 kg (138 lb)   BMI 25.24 kg/m    In general, the patient is a pleasant female in no apparent distress.    HEENT: NC/AT.  PERRLA.  EOMI.  Sclerae white, not injected.  Nares clear.  Pharynx without erythema or exudate.  Dentition intact.    Neck: No adenopathy.  No thyromegaly. Carotids +4/4 bilaterally without bruits.  No jugular venous distension.   Heart: RRR. Normal S1, S2 splits physiologically. No murmur, rub, click, or gallop. The PMI is in the 5th ICS in the midclavicular line. There is no heave.    Lungs: CTA.  No ronchi, wheezes, rales.  No dullness to percussion.   Abdomen: Soft, nontender, nondistended. No organomegaly.  No bruits.   Extremities: No clubbing, cyanosis, or edema.  The pulses are +4/4 at the radial, brachial, femoral, popliteal, DP, and PT sites bilaterally.  No bruits are noted.  Neurologic: Alert and oriented to person/place/time, normal speech, gait and affect  Skin: No petechiae, purpura or rash.    Labs:  LIPID RESULTS:  Lab Results   Component Value Date    CHOL 182 01/24/2019    HDL 57 01/24/2019    LDL 80 01/24/2019    TRIG 227 (H) 01/24/2019    CHOLHDLRATIO 5.9 (H) 10/13/2016    NHDL 125 01/24/2019       LIVER ENZYME RESULTS:  Lab Results   Component Value Date    AST 24 10/13/2016    ALT <5 (L) 01/24/2019       CBC RESULTS:  Lab Results   Component Value Date    WBC 8.4 03/09/2018    RBC 5.31 (A) 03/09/2018    HGB 16.3 (A) 03/09/2018    HCT 48.7 (A) 03/09/2018    MCV 91.8 03/09/2018    MCH 33.5 (A) 03/09/2018    MCHC 33.5 03/09/2018    RDW 12.8 03/09/2018     " 03/09/2018       BMP RESULTS:  Lab Results   Component Value Date     03/09/2018    POTASSIUM 4.1 03/09/2018    CHLORIDE 101 03/09/2018    CO2 26 03/09/2018    GLC 89 03/09/2018     03/16/2001    BUN 16 03/09/2018    BUN NOT APPLICABLE 03/09/2018    CR 0.74 03/09/2018    GFRESTIMATED 81 03/09/2018    FARRAH 9.8 03/09/2018        A1C RESULTS:  Lab Results   Component Value Date    A1C 5.4 10/15/2015       JANAE Ritchie MD     CC  Patient Care Team:  Lia Jones MD as PCP - General  LIA JONES

## 2019-01-24 NOTE — PROGRESS NOTES
Service Date: 2019      HISTORY OF PRESENT ILLNESS:  Ms. Ibrahim is a pleasant 73-year-old woman, who I met in 2016 for hyperlipidemia with an LDL up to 250.  We put her on Crestor and fortunately her cholesterol came down nicely.  She has no history of coronary artery disease and no family history of early coronary artery disease, but she was adopted after her dad  of meningitis in his 20s, so we do not know complete details.  Over the course of the past year, she has been doing well without any concerning cardiovascular symptoms.  During the summer and spring, she walks daily and is quite active, but during the winter only does the elliptical and it sounds like not consistently.  Her triglycerides are reflective of that at 227.  Her LDL, however, remains 80 with an HDL of 57 and normal liver function tests.      IMPRESSION/PLAN:   1.  Hyperlipidemia, controlled on treatment.   2.  Hypertriglyceridemia.   3.  History of kidney stones.      DISCUSSION:  It was a pleasure to see Ms. Ibrahim in followup.  Clinically, she is doing well from a cardiovascular standpoint with no concerning symptoms.  Discussed getting active again even in the winter - if she can do some things at home, that would help her triglycerides.  Otherwise would keep her medication regimen as is and plan to see her back in a year or sooner if there are any issues in the interim.  She understands and is in agreement with the plan as outlined.  All questions were answered.      Please do not hesitate to contact me with any questions or concerns.         RAYRAY MONTEJO MD             D: 2019   T: 2019   MT: al      Name:     ASHER IBRAHIM   MRN:      [id number]        Account:      [id number]   :      1945           Service Date: 2019      Document: [id number]

## 2019-04-03 ENCOUNTER — OFFICE VISIT (OUTPATIENT)
Dept: FAMILY MEDICINE | Facility: CLINIC | Age: 74
End: 2019-04-03

## 2019-04-03 VITALS
HEART RATE: 64 BPM | WEIGHT: 133.6 LBS | HEIGHT: 62 IN | SYSTOLIC BLOOD PRESSURE: 134 MMHG | TEMPERATURE: 97.8 F | OXYGEN SATURATION: 98 % | DIASTOLIC BLOOD PRESSURE: 88 MMHG | BODY MASS INDEX: 24.59 KG/M2

## 2019-04-03 DIAGNOSIS — D58.2 ABNORMAL HEMOGLOBIN (H): ICD-10-CM

## 2019-04-03 DIAGNOSIS — Z00.00 ROUTINE HISTORY AND PHYSICAL EXAMINATION OF ADULT: Primary | ICD-10-CM

## 2019-04-03 LAB
ERYTHROCYTE [DISTWIDTH] IN BLOOD BY AUTOMATED COUNT: 12.7 %
HCT VFR BLD AUTO: 48.8 % (ref 35–47)
HEMOGLOBIN: 16.1 G/DL (ref 11.7–15.7)
MCH RBC QN AUTO: 30.7 PG (ref 26–33)
MCHC RBC AUTO-ENTMCNC: 33 G/DL (ref 31–36)
MCV RBC AUTO: 93.2 FL (ref 78–100)
PLATELET COUNT - QUEST: 265 10^9/L (ref 150–375)
RBC # BLD AUTO: 5.24 10*12/L (ref 3.8–5.2)
WBC # BLD AUTO: 8.6 10*9/L (ref 4–11)

## 2019-04-03 PROCEDURE — G0438 PPPS, INITIAL VISIT: HCPCS | Performed by: FAMILY MEDICINE

## 2019-04-03 PROCEDURE — 36415 COLL VENOUS BLD VENIPUNCTURE: CPT | Performed by: FAMILY MEDICINE

## 2019-04-03 PROCEDURE — 85027 COMPLETE CBC AUTOMATED: CPT | Performed by: FAMILY MEDICINE

## 2019-04-03 ASSESSMENT — MIFFLIN-ST. JEOR: SCORE: 1064.26

## 2019-04-03 NOTE — PATIENT INSTRUCTIONS
Vitamin D3 1000 IU daily    No calcium pills    shingrix vaccine is recommended- ask your pharmacist

## 2019-04-03 NOTE — PROGRESS NOTES
"SUBJECTIVE:   Sapna Ibrahim is a 73 year old female who presents for Preventive Visit.      Are you in the first 12 months of your Medicare Part B coverage?  No    Sees cardiologist for monitoring of her hyperlipidemia  History of Neurodermatitis- excoriations upper back    Health Care Maintenance : zoster  dexa scan - 2016  Do not take calcium- kidney stones    Eye doctor visit due- has tearing  Dental visit unit(s) to date    Physical Health:    In general, how would you rate your overall physical health? excellent    Outside of work, how many days during the week do you exercise? 6-7 days/week    Outside of work, approximately how many minutes a day do you exercise?greater than 60 minutes    If you drink alcohol do you typically have >3 drinks per day or >7 drinks per week? Nowalks 4-6 miles daily    Do you usually eat at least 4 servings of fruit and vegetables a day, include whole grains & fiber and avoid regularly eating high fat or \"junk\" foods? Yes=- small serving of ice cream    Do you have any problems taking medications regularly?  No    Ibuprofen rarely for lower back pain    Rosuvastatin- from cariodlogy    Do you have any side effects from medications? none    Needs assistance for the following daily activities: no assistance needed    Which of the following safety concerns are present in your home?  none identified     Hearing impairment:right hearing loss after trigeminal neuralgia    In the past 6 months, have you been bothered by leaking of urine? no    Stretches daily    Mental Health:    In general, how would you rate your overall mental or emotional health? excellent  PHQ-2 Score:    PHQ-2 Score:     PHQ-2 ( 1999 [employer]) 4/3/2019 3/9/2018   Q1: Little interest or pleasure in doing things 0 0   Q2: Feeling down, depressed or hopeless 0 0   PHQ-2 Score 0 0       Do you feel safe in your environment? NO    Do you have a Health Care Directive? No: Advance care planning was reviewed with patient; " patient declined at this time.    Additional concerns to address?  No    Hot flashes- formerly on HRT- does not wish medications    Fall risk:  Fallen 2 or more times in the past year?: No  Any fall with injury in the past year?: No    Cognitive Screening: 3 words., clock  1) Repeat 3 items (Leader, Season, Table)    2) Clock draw: ABNORMAL: two hands from the number 11 instead of central clock  3) 3 item recall: Recalls 3 objects  Results: 3 items recalled: COGNITIVE IMPAIRMENT LESS LIKELY    Mini-CogTM Copyright CYDNEY Dumont. Licensed by the author for use in [facility]; reprinted with permission (negrito@[facility]). All rights reserved.      Do you have sleep apnea, excessive snoring or daytime drowsiness?: no    PROBLEMS TO ADD ON...  Neurodermatitis, improved    Reviewed and updated as needed this visit by clinical staff         Reviewed and updated as needed this visit by Provider        Social History     Tobacco Use     Smoking status: Never Smoker     Smokeless tobacco: Never Used   Substance Use Topics     Alcohol use: Yes     Alcohol/week: 2.0 oz     Types: 4 Standard drinks or equivalent per week     Comment: 4/week                           Current providers sharing in care for this patient include:   Patient Care Team:  Meredith Silva MD as PCP - General  Meredith Silva MD as Assigned PCP    The following health maintenance items are reviewed in Epic and correct as of today:  Health Maintenance   Topic Date Due     ZOSTER IMMUNIZATION (2 of 3) 08/25/2011     FALL RISK ASSESSMENT  10/13/2017     INFLUENZA VACCINE (1) 09/01/2018     MAMMO Q1 YR  11/08/2018     PHQ-2 Q1 YR  03/09/2019     MEDICARE ANNUAL WELLNESS VISIT  03/09/2019     ADVANCE DIRECTIVE PLANNING Q5 YRS  05/08/2019     COLON CANCER SCREEN (SYSTEM ASSIGNED)  05/12/2020     LIPID SCREEN Q5 YR FEMALE (SYSTEM ASSIGNED)  01/24/2024     DTAP/TDAP/TD IMMUNIZATION (3 - Td) 07/31/2025     DEXA SCAN SCREENING (SYSTEM ASSIGNED)   Completed     HEPATITIS C SCREENING  Completed     IPV IMMUNIZATION  Aged Out     MENINGITIS IMMUNIZATION  Aged Out     BP Readings from Last 3 Encounters:   04/03/19 134/88   01/24/19 117/73   03/09/18 124/70    Wt Readings from Last 3 Encounters:   04/03/19 60.6 kg (133 lb 9.6 oz)   01/24/19 62.6 kg (138 lb)   03/09/18 61.1 kg (134 lb 9.6 oz)                  Patient Active Problem List   Diagnosis     Mixed hyperlipidemia     Allergic state     ACP (advance care planning)     Health Care Home     Calculus of kidney     Dermatitis     Past Surgical History:   Procedure Laterality Date     C NONSPECIFIC PROCEDURE  1994    microvascular decompression of the right trigeminal nerve     C VAGINAL HYSTERECTOMY  1983    menorhagia; both ovaries remain     CATARACT IOL, RT/LT Bilateral      COLONOSCOPY  9/2014    hemorrhoids;      COMBINED CYSTOSCOPY, INSERT STENT URETER(S)  12/10/2013    Procedure: COMBINED CYSTOSCOPY, INSERT STENT URETER(S);;  Surgeon: Milton Rangel MD;  Location: [facility]     EXTRACORPOREAL SHOCK WAVE LITHOTRIPSY (ESWL)  12/10/2013    Procedure: EXTRACORPOREAL SHOCK WAVE LITHOTRIPSY (ESWL);  RIGHT EXTRACORPOREAL SHOCK WAVE LITHOTRIPSY, CYSTOSCOPY WITH RIGHT STENT PLACEMENT, RIGHT RETROGRADE;  Surgeon: Milton Rangel MD;  Location: [facility]     EXTRACORPOREAL SHOCK WAVE LITHOTRIPSY (ESWL)  5/13/2014    Procedure: EXTRACORPOREAL SHOCK WAVE LITHOTRIPSY (ESWL);  Surgeon: Milton Rangel MD;  Location: [facility]     HC COLONOSCOPY THRU STOMA, DIAGNOSTIC  7/8/03    normal; diverticulosis     HC LAPAROSCOPY, SURGICAL; CHOLECYSTECTOMY  1996    open procedure     HC REMOVAL OF TONSILS,12+ Y/O      age 21     HCL PAP SMEAR  1999    normal     RELEASE CARPAL TUNNEL  12/2015     VASCULAR SURGERY  1994    microvascular decompression of trigeminal nerve (MVD)       Social History     Tobacco Use     Smoking status: Never Smoker     Smokeless tobacco: Never Used   Substance Use Topics     Alcohol use: Yes     Alcohol/week:  "2.0 oz     Types: 4 Standard drinks or equivalent per week     Comment: 4/week     Family History   Adopted: Yes   Problem Relation Age of Onset     Heart Disease Mother      Diabetes Maternal Grandfather      Heart Disease Father          age 26; enlarged heart     Diabetes Paternal Aunt      Heart Disease Brother         CABG     Heart Disease Sister          Current Outpatient Medications   Medication Sig Dispense Refill     cetirizine (ZYRTEC) 10 MG tablet TAKE ONE TABLET BY MOUTH EVERY EVENING (Patient taking differently: TAKE ONE TABLET BY MOUTH EVERY EVENING PRN) 90 tablet 3     rosuvastatin (CRESTOR) 20 MG tablet Take 1 tablet (20 mg) by mouth daily 90 tablet 3     ibuprofen (ADVIL,MOTRIN) 400 MG tablet Take 1 tablet (400 mg) by mouth 3 times daily (with meals) (Patient taking differently: Take 400 mg by mouth every 8 hours as needed )       Mammogram Screening: Mammogram Screening: Patient over age 50, mutual decision to screen reflected in health maintenance.    ROS:  Constitutional, HEENT, cardiovascular, pulmonary, GI, , musculoskeletal, neuro, skin, endocrine and psych systems are negative, except as otherwise noted.  No incontinence  OBJECTIVE:   There were no vitals taken for this visit. Estimated body mass index is 25.24 kg/m  as calculated from the following:    Height as of 19: 1.575 m (5' 2\").    Weight as of 19: 62.6 kg (138 lb).  EXAM:   General appearance: Healthy    Skin: Bronze colored No atypical appearing moles on inspection of trunk and extremities. She has a few open sores on her upper back    External ears  and canals clear bilaterally. TM's normal bilaterally. Nose normal without lesions or discharge. Oropharynx normal. Neck supple without palpable adenopathy.    Breasts are symmetric.  No dominant, discrete, fixed  or suspicious masses are noted.  No skin or nipple changes or axillary nodes.      Regular rate and  rhythm. S1 and S2 normal, no murmurs, clicks, gallops or " "rubs. No edema or JVD. Chest is clear; no wheezes or rales.    The abdomen is soft without tenderness, guarding, mass or organomegaly. Bowel sounds are normal. No CVA tenderness or inguinal adenopathy noted.    Pelvic:  Deferred  Rectal exam: Deferred    Extremities: negative.      Diagnostic Test Results:  Results for orders placed or performed in visit on 04/03/19 (from the past 24 hour(s))   HEMOGRAM/PLATELET (BFP)   Result Value Ref Range    WBC 8.6 4.0 - 11 10*9/L    RBC Count 5.24 (A) 3.8 - 5.2 10*12/L    Hemoglobin 16.1 (A) 11.7 - 15.7 g/dL    Hematocrit 48.8 (A) 35.0 - 47.0 %    MCV 93.2 78 - 100 fL    MCH 30.7 26 - 33 pg    MCHC 33.0 31 - 36 g/dL    RDW 12.7 %    Platelet Count 265 150 - 375 10^9/L       ASSESSMENT / PLAN:   1. Routine history and physical examination of adult     - VENOUS COLLECTION  - BASIC METABOLIC PANEL (QUEST)  - HEMOGRAM/PLATELET (BFP)  - FERRITIN (QUEST)    2. Abnormal hemoglobin (H)    - VENOUS COLLECTION  - HEMOGRAM/PLATELET (BFP)  - FERRITIN (QUEST)    End of Life Planning:  Patient currently has an advanced directive: No.  I have verified the patient's ablity to prepare an advanced directive/make health care decisions.  Literature was provided to assist patient in preparing an advanced directive.    COUNSELING:  Reviewed preventive health counseling, as reflected in patient instructions  Special attention given to:       Regular exercise       Healthy diet/nutrition       Osteoporosis Prevention/Bone Health       Advanced Planning     BP Readings from Last 1 Encounters:   01/24/19 117/73     Estimated body mass index is 25.24 kg/m  as calculated from the following:    Height as of 1/24/19: 1.575 m (5' 2\").    Weight as of 1/24/19: 62.6 kg (138 lb).         reports that  has never smoked. she has never used smokeless tobacco.      Appropriate preventive services were discussed with this patient, including applicable screening as appropriate for cardiovascular disease, diabetes, " osteopenia/osteoporosis, and glaucoma.  As appropriate for age/gender, discussed screening for colorectal cancer, prostate cancer, breast cancer, and cervical cancer. Checklist reviewing preventive services available has been given to the patient.    Reviewed patients plan of care and provided an AVS. The Basic Care Plan (routine screening as documented in Health Maintenance) for Sapna meets the Care Plan requirement. This Care Plan has been established and reviewed with the Patient.    Counseling Resources:  ATP IV Guidelines  Pooled Cohorts Equation Calculator  Breast Cancer Risk Calculator  FRAX Risk Assessment  ICSI Preventive Guidelines  Dietary Guidelines for Americans, 2010  USDA's MyPlate  ASA Prophylaxis  Lung CA Screening    Meredith Silva MD  [facility]

## 2019-04-03 NOTE — NURSING NOTE
Sapna is here for CPX fasting    Patient is here for a full physical exam.    Pre-Visit Screening :  Immunizations : up to date  Colon Screening : NA d/t age  Mammogram: NA d/t age  Asthma Action Test/Plan : NA  PHQ9 :  None  GAD7 :  None  Patient's  BMI Body mass index is 24.44 kg/m .  Questioned patient about current smoking habits.  Pt. has never smoked.  OK to leave a detailed voice message regarding today's visit Yes, phone # [phone]      ETOH screening:  Questions:  1-How often do you have a drink containing alcohol?                             3 times per week(s)  2-How many drinks containing alcohol do you have on a typical day when you are         Drinking?                              2   3- How often do you have 5 or more drinks on one occasion?                              Never

## 2019-04-04 LAB
BUN SERPL-MCNC: 17 MG/DL (ref 7–25)
BUN/CREATININE RATIO: ABNORMAL (CALC) (ref 6–22)
CALCIUM SERPL-MCNC: 9.9 MG/DL (ref 8.6–10.4)
CHLORIDE SERPLBLD-SCNC: 99 MMOL/L (ref 98–110)
CO2 SERPL-SCNC: 24 MMOL/L (ref 20–32)
CREAT SERPL-MCNC: 0.79 MG/DL (ref 0.6–0.93)
EGFR AFRICAN AMERICAN - QUEST: 86 ML/MIN/1.73M2
FERRITIN SERPL-MCNC: 144 NG/ML (ref 20–288)
GFR SERPL CREATININE-BSD FRML MDRD: 74 ML/MIN/1.73M2
GLUCOSE - QUEST: 101 MG/DL (ref 65–99)
POTASSIUM SERPL-SCNC: 4.4 MMOL/L (ref 3.5–5.3)
SODIUM SERPL-SCNC: 137 MMOL/L (ref 135–146)

## 2019-08-14 ENCOUNTER — OFFICE VISIT (OUTPATIENT)
Dept: FAMILY MEDICINE | Facility: CLINIC | Age: 74
End: 2019-08-14

## 2019-08-14 VITALS
BODY MASS INDEX: 24.07 KG/M2 | WEIGHT: 131.6 LBS | DIASTOLIC BLOOD PRESSURE: 70 MMHG | SYSTOLIC BLOOD PRESSURE: 128 MMHG | OXYGEN SATURATION: 98 % | TEMPERATURE: 97.5 F | HEART RATE: 57 BPM

## 2019-08-14 DIAGNOSIS — Z12.31 ENCOUNTER FOR SCREENING MAMMOGRAM FOR BREAST CANCER: Primary | ICD-10-CM
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
## 2019-08-14 DIAGNOSIS — M19.019 ARTHROPATHY OF SHOULDER REGION: ICD-10-CM

## 2019-08-14 PROCEDURE — 99213 OFFICE O/P EST LOW 20 MIN: CPT | Performed by: FAMILY MEDICINE

## 2019-08-14 NOTE — PROGRESS NOTES
SUBJECTIVE:  74 year old female presents with the following concern:    Two days ago, bit on her right wrist when out walking by a neighbors dog.  She cleaned the wound with peroxide  No pain  Normal wrist and hand function  Wound has scabbed      Other concerns:  Right shoulder pain   Previous cortisone injection with good resolution of symptoms years ago    Patient Active Problem List   Diagnosis     Mixed hyperlipidemia     Allergic state     ACP (advance care planning)     Health Care Home     Calculus of kidney     Dermatitis     Past Surgical History:   Procedure Laterality Date     C NONSPECIFIC PROCEDURE  1994    microvascular decompression of the right trigeminal nerve     C VAGINAL HYSTERECTOMY  1983    menorhagia; both ovaries remain     CATARACT IOL, RT/LT Bilateral      COLONOSCOPY  9/2014    hemorrhoids;      COMBINED CYSTOSCOPY, INSERT STENT URETER(S)  12/10/2013    Procedure: COMBINED CYSTOSCOPY, INSERT STENT URETER(S);;  Surgeon: Milton Rangel MD;  [facility]     EXTRACORPOREAL SHOCK WAVE LITHOTRIPSY (ESWL)  12/10/2013    Procedure: EXTRACORPOREAL SHOCK WAVE LITHOTRIPSY (ESWL);  RIGHT EXTRACORPOREAL SHOCK WAVE LITHOTRIPSY, CYSTOSCOPY WITH RIGHT STENT PLACEMENT, RIGHT RETROGRADE;  Surgeon: Milton Rangel MD;  Location[facility]     EXTRACORPOREAL SHOCK WAVE LITHOTRIPSY (ESWL)  5/13/2014    Procedure: EXTRACORPOREAL SHOCK WAVE LITHOTRIPSY (ESWL);  Surgeon: Milton Rangel MD;  Location: [facility]     HC COLONOSCOPY THRU STOMA, DIAGNOSTIC  7/8/03    normal; diverticulosis     HC LAPAROSCOPY, SURGICAL; CHOLECYSTECTOMY  1996    open procedure     HC REMOVAL OF TONSILS,12+ Y/O      age 21     HCL PAP SMEAR  1999    normal     RELEASE CARPAL TUNNEL  12/2015     VASCULAR SURGERY  1994    microvascular decompression of trigeminal nerve (MVD)     Current Outpatient Medications   Medication     amoxicillin-clavulanate (AUGMENTIN) 875-125 MG tablet     rosuvastatin (CRESTOR) 20 MG tablet     cetirizine  (ZYRTEC) 10 MG tablet     ibuprofen (ADVIL,MOTRIN) 400 MG tablet     No current facility-administered medications for this visit.       ROS: 7 point ROS neg other than the symptoms noted above in the HPI.    OBJECTIVE:  /70 (BP Location: Left arm, Patient Position: Sitting, Cuff Size: Adult Regular)   Pulse 57   Temp 97.5  F (36.4  C) (Oral)   Wt 59.7 kg (131 lb 9.6 oz)   SpO2 98%   BMI 24.07 kg/m    No acute distress  Right wrist: 2 cm laceration on dorsum of distal radius and puncture wound volar surface scabbed  No erythema  No drainage  No pain with ROM  Normal flexion and extension of wrist  Normal function of thumb and fingers    Right shoulder- limited ROM - internal rotation and abduction with pain  Passive ROM painful    Assessment   (Z12.31) Encounter for screening mammogram for breast cancer  (primary encounter diagnosis)  Comment: encouraged mammogram screening  Plan: Mammo Screening digital (bilat)            (M19.019) Arthropathy of shoulder region  Comment: offered PT with ansaid- she desires cortisone injection  Encouraged PT following injection  She will return to [facility] who did her previous injection    Plan: ORTHOPEDICS ADULT REFERRAL            (W54.0XXA) Dog bite, initial encounter  Comment: observation  Plan: amoxicillin-clavulanate (AUGMENTIN) 875-125 MG         tablet

## 2019-08-14 NOTE — NURSING NOTE
Chief Complaint   Patient presents with     Dog Bite     was on a walk in [place] and the dog was across the street with the owner, not on a lesh, it happened so quickly, on right wrist/hand, occurred on Monday 8/12, she cleaned it with peroxide     Consult     limited movement on the right shoulder, has had this for a few months (2) had a cortisone shot in that arm a while back      Pre-visit Screening:  Immunizations:  not up to date - shingles shot will talk to doctor and pharmacy  Colonoscopy:  is up to date  Mammogram: is due and ordered today  Asthma Action Test/Plan:  NA  PHQ9:  NA  GAD7:  NA  Questioned patient about current smoking habits Pt. quit smoking some time ago.  Ok to leave detailed message on voice mail for today's visit only yes, phone # [phone]

## 2019-08-16 ENCOUNTER — HOSPITAL ENCOUNTER (OUTPATIENT)
Dept: MAMMOGRAPHY | Facility: CLINIC | Age: 74
Discharge: HOME | End: 2019-08-16
Attending: FAMILY MEDICINE | Admitting: FAMILY MEDICINE
Payer: COMMERCIAL

## 2019-08-16 DIAGNOSIS — Z12.31 ENCOUNTER FOR SCREENING MAMMOGRAM FOR BREAST CANCER: ICD-10-CM

## 2019-08-16 PROCEDURE — 77063 BREAST TOMOSYNTHESIS BI: CPT

## 2019-08-27 DIAGNOSIS — J30.1 CHRONIC ALLERGIC RHINITIS DUE TO POLLEN: ICD-10-CM

## 2019-08-27 RX ORDER — CETIRIZINE HYDROCHLORIDE 10 MG/1
TABLET ORAL
Qty: 90 TABLET | Refills: 3 | Status: SHIPPED | OUTPATIENT
Start: 2019-08-27 | End: 2023-08-31

## 2019-08-27 NOTE — TELEPHONE ENCOUNTER
Pending Prescriptions:                       Disp   Refills    cetirizine (ZYRTEC) 10 MG tablet [Pharmac*90 tab*             Sig: TAKE ONE TABLET BY MOUTH EVERY EVENING    Last refill was 8-1-2018  Last ov/px was 4-3-2019  Change qty fax and close encounter  Kayce  [phone] (home) NONE (work)

## 2019-09-27 ENCOUNTER — HEALTH MAINTENANCE LETTER (OUTPATIENT)
Age: 74
End: 2019-09-27

## 2020-01-08 DIAGNOSIS — E78.2 MIXED HYPERLIPIDEMIA: ICD-10-CM

## 2020-01-08 RX ORDER — ROSUVASTATIN CALCIUM 20 MG/1
20 TABLET, COATED ORAL DAILY
Qty: 90 TABLET | Refills: 0 | Status: SHIPPED | OUTPATIENT
Start: 2020-01-08 | End: 2020-04-13

## 2020-01-08 NOTE — TELEPHONE ENCOUNTER
Received call from pt requesting refill of rosuvastatin 20 mg daily. Pt is scheduled for a fasting lipid panel and ALT on 3/4/2020 and annual follow up with Dr. Ritchie on 3/5/2020. Rx sent to pt's preferred pharmacy for 90 day supply with 0 refills.

## 2020-02-26 DIAGNOSIS — E78.2 MIXED HYPERLIPIDEMIA: Primary | ICD-10-CM

## 2020-03-04 DIAGNOSIS — E78.2 MIXED HYPERLIPIDEMIA: ICD-10-CM

## 2020-03-04 LAB
ALT SERPL W P-5'-P-CCNC: 27 U/L (ref 0–50)
CHOLEST SERPL-MCNC: 201 MG/DL
HDLC SERPL-MCNC: 62 MG/DL
LDLC SERPL CALC-MCNC: 91 MG/DL
NONHDLC SERPL-MCNC: 139 MG/DL
TRIGL SERPL-MCNC: 238 MG/DL

## 2020-03-04 PROCEDURE — 80061 LIPID PANEL: CPT | Performed by: INTERNAL MEDICINE

## 2020-03-04 PROCEDURE — 36415 COLL VENOUS BLD VENIPUNCTURE: CPT | Performed by: INTERNAL MEDICINE

## 2020-03-04 PROCEDURE — 84460 ALANINE AMINO (ALT) (SGPT): CPT | Performed by: INTERNAL MEDICINE

## 2020-03-05 ENCOUNTER — OFFICE VISIT (OUTPATIENT)
Dept: CARDIOLOGY | Facility: CLINIC | Age: 75
End: 2020-03-05
Payer: COMMERCIAL

## 2020-03-05 VITALS
SYSTOLIC BLOOD PRESSURE: 119 MMHG | BODY MASS INDEX: 24.46 KG/M2 | HEIGHT: 62 IN | HEART RATE: 72 BPM | DIASTOLIC BLOOD PRESSURE: 60 MMHG | WEIGHT: 132.9 LBS

## 2020-03-05 DIAGNOSIS — E78.2 MIXED HYPERLIPIDEMIA: Primary | ICD-10-CM

## 2020-03-05 PROCEDURE — 99213 OFFICE O/P EST LOW 20 MIN: CPT | Performed by: INTERNAL MEDICINE

## 2020-03-05 RX ORDER — IBUPROFEN 200 MG
200 TABLET ORAL EVERY 8 HOURS PRN
COMMUNITY
End: 2023-02-08

## 2020-03-05 ASSESSMENT — MIFFLIN-ST. JEOR: SCORE: 1056.08

## 2020-03-05 NOTE — LETTER
3/5/2020    Little Casas MD  [address], [address]  [facility] [address]    RE: Sapna LYNDA Ibrahim       Dear Colleague,    I had the pleasure of seeing Sapna Ibrahim in the [facility].    HISTORY OF PRESENT ILLNESS:    Ms. Ibrahim is a wonderful  75-year-old woman who is way younger than stated age, who I met in 2016 for hyperlipidemia with an LDL up to 250. She started on Crestor, currently at 20mg daily, and fortunately her cholesterol came down nicely.      She has no history of coronary artery disease  but we do not know family history as she was adopted after her dad  of meningitis in his 20s.     Over the course of the past year, she has been doing well without any concerning cardiovascular symptoms.  During the summer and spring, she walks daily and is quite active, but during the winter only does the elliptical --she will try to get in 150 minutes a week.      Her cholesterol from today looks reasonable with LDL of 91 and HDL 62    PAST MEDICAL HISTORY:  Past Medical History:   Diagnosis Date     Allergic rhinitis due to pollen      Other and unspecified hyperlipidemia     Hyperlipidemia     Renal disease     kidney stone       CURRENT MEDICATIONS:  Current Outpatient Medications   Medication Sig Dispense Refill     cetirizine (ZYRTEC) 10 MG tablet TAKE ONE TABLET BY MOUTH EVERY EVENING 90 tablet 3     ibuprofen (ADVIL/MOTRIN) 200 MG tablet Take 200 mg by mouth every 8 hours as needed for mild pain       rosuvastatin (CRESTOR) 20 MG tablet Take 1 tablet (20 mg) by mouth daily 90 tablet 0       PAST SURGICAL HISTORY:  Past Surgical History:   Procedure Laterality Date     C NONSPECIFIC PROCEDURE      microvascular decompression of the right trigeminal nerve     C VAGINAL HYSTERECTOMY      menorhagia; both ovaries remain     CATARACT IOL, RT/LT Bilateral      COLONOSCOPY  2014    hemorrhoids;      COMBINED CYSTOSCOPY, INSERT STENT URETER(S)  12/10/2013     Procedure: COMBINED CYSTOSCOPY, INSERT STENT URETER(S);;  Surgeon: Milton Rangel MD;  [facility]     EXTRACORPOREAL SHOCK WAVE LITHOTRIPSY (ESWL)  12/10/2013    Procedure: EXTRACORPOREAL SHOCK WAVE LITHOTRIPSY (ESWL);  RIGHT EXTRACORPOREAL SHOCK WAVE LITHOTRIPSY, CYSTOSCOPY WITH RIGHT STENT PLACEMENT, RIGHT RETROGRADE;  Surgeon: Milton Rangel MD;  Location: [facility]     EXTRACORPOREAL SHOCK WAVE LITHOTRIPSY (ESWL)  2014    Procedure: EXTRACORPOREAL SHOCK WAVE LITHOTRIPSY (ESWL);  Surgeon: Milton Rangel MD;  Location: [facility]     HC COLONOSCOPY THRU STOMA, DIAGNOSTIC  03    normal; diverticulosis     HC LAPAROSCOPY, SURGICAL; CHOLECYSTECTOMY      open procedure     HC REMOVAL OF TONSILS,12+ Y/O      age 21     HCL PAP SMEAR      normal     RELEASE CARPAL TUNNEL  2015     VASCULAR SURGERY      microvascular decompression of trigeminal nerve (MVD)       ALLERGIES  Meperidine hcl and Percodan [aspirin]    FAMILY HX:  Family History   Adopted: Yes   Problem Relation Age of Onset     Heart Disease Mother      Diabetes Maternal Grandfather      Heart Disease Father          age 26; enlarged heart     Diabetes Paternal Aunt      Heart Disease Brother         CABG     Heart Disease Sister        SOCIAL HX:  Social History     Socioeconomic History     Marital status:      Spouse name: None     Number of children: None     Years of education: None     Highest education level: None   Occupational History     None   Social Needs     Financial resource strain: None     Food insecurity:     Worry: None     Inability: None     Transportation needs:     Medical: None     Non-medical: None   Tobacco Use     Smoking status: Never Smoker     Smokeless tobacco: Never Used   Substance and Sexual Activity     Alcohol use: Yes     Alcohol/week: 3.3 standard drinks     Types: 4 Standard drinks or equivalent per week     Comment: 4/week     Drug use: No     Sexual activity: Yes   Lifestyle      "Physical activity:     Days per week: None     Minutes per session: None     Stress: None   Relationships     Social connections:     Talks on phone: None     Gets together: None     Attends [employer] service: None     Active member of club or organization: None     Attends meetings of clubs or organizations: None     Relationship status: None     Intimate partner violence:     Fear of current or ex partner: None     Emotionally abused: None     Physically abused: None     Forced sexual activity: None   Other Topics Concern      Service Not Asked     Blood Transfusions Not Asked     Caffeine Concern Not Asked     Occupational Exposure Not Asked     Hobby Hazards Not Asked     Sleep Concern Not Asked     Stress Concern Not Asked     Weight Concern Not Asked     Special Diet No     Back Care Not Asked     Exercise Yes     Comment: walking everyday     Bike Helmet Not Asked     Seat Belt Not Asked     Self-Exams Not Asked     Parent/sibling w/ CABG, MI or angioplasty before 65F 55M? Not Asked   Social History Narrative     None       ROS:  Constitutional: No fever, chills, or sweats. No weight gain/loss.   ENT: No visual disturbance, ear ache, epistaxis, sore throat.   Allergies/Immunologic: Negative.   Respiratory: No cough, hemoptysis.   Cardiovascular: As per HPI.   GI: No nausea, vomiting, hematemesis, melena, or hematochezia.   : No urinary frequency, dysuria, or hematuria.   Integument: Negative.   Psychiatric: Negative.   Neuro: Negative.   Endocrinology: Negative.   Musculoskeletal: No myalgia.    VITAL SIGNS:  /72   Pulse 72   Ht 1.575 m (5' 2\")   Wt 60.3 kg (132 lb 14.4 oz)   BMI 24.31 kg/m     Body mass index is 24.31 kg/m .  Wt Readings from Last 2 Encounters:   03/05/20 60.3 kg (132 lb 14.4 oz)   08/14/19 59.7 kg (131 lb 9.6 oz)       PHYSICAL EXAM  Sapna Ibrahim IS A 74 year old female.in no acute distress.  HEENT: Unremarkable.  Neck: JVP normal.  Carotids +4/4 bilaterally without " bruits.  Lungs: CTA.  Cor: RRR. Normal S1 and S2.  No murmur, rub, or gallop.  PMI in Lf 5th ICS.  Abd: Soft, nontender, nondistended.  NABS.  No pulsatile mass.  Extremities: No C/C/E.  Pulses +4/4 symmetric in upper and lower extremities.  Neuro: Grossly intact.    LABS    Lab Results   Component Value Date    WBC 8.6 04/03/2019     Lab Results   Component Value Date    RBC 5.24 04/03/2019     Lab Results   Component Value Date    HGB 16.1 04/03/2019     Lab Results   Component Value Date    HCT 48.8 04/03/2019     No components found for: MCT  Lab Results   Component Value Date    MCV 93.2 04/03/2019     Lab Results   Component Value Date    MCH 30.7 04/03/2019     Lab Results   Component Value Date    MCHC 33.0 04/03/2019     Lab Results   Component Value Date    RDW 12.7 04/03/2019     Lab Results   Component Value Date     04/03/2019      Recent Labs   Lab Test 04/03/19  1136 03/09/18  1102    137   POTASSIUM 4.4 4.1   CHLORIDE 99 101   CO2 24 26   * 89   BUN 17  NOT APPLICABLE 16  NOT APPLICABLE   CR 0.79 0.74   FARRAH 9.9 9.8     Recent Labs   Lab Test 03/04/20  0925 01/24/19  0953  10/13/16  1713 10/15/15  1008   CHOL 201* 182   < > 350* 323*   HDL 62 57   < > 59 58   LDL 91 80   < > 246* 221*   TRIG 238* 227*   < > 225* 221*   CHOLHDLRATIO  --   --   --  5.9* 5.6*   NHDL 139* 125   < >  --   --     < > = values in this interval not displayed.        IMPRESSION/PLAN:   1.  Hyperlipidemia, controlled on treatment.   2.  Hypertriglyceridemia.   3.  History of kidney stones.      DISCUSSION:  It was a pleasure to see Ms. Ibrahim in followup.  Clinically, she is doing well from a cardiovascular standpoint with no concerning symptoms.  She will try to get in 150 minutes a week of exercise.     Will plan to see her back in a year or sooner if there are any issues in the interim.  She understands and is in agreement with the plan as outlined.  All questions were answered.      Please do not  hesitate to contact me with any questions or concerns.         RAYRAY RITCHIE MD       Thank you for allowing me to participate in the care of your patient.      Sincerely,     Rayray Ritchie MD     [facility]    cc:   No referring provider defined for this encounter.

## 2020-03-05 NOTE — LETTER
3/5/2020    Little Casas MD  [address], [address]  [facility] [address]    RE: Sapna LYNDA Ibrahim       Dear Colleague,    I had the pleasure of seeing Sapna Ibrahim in the [facility].    HISTORY OF PRESENT ILLNESS:    Ms. Ibrahim is a wonderful  75-year-old woman who is way younger than stated age, who I met in 2016 for hyperlipidemia with an LDL up to 250. She started on Crestor, currently at 20mg daily, and fortunately her cholesterol came down nicely.      She has no history of coronary artery disease  but we do not know family history as she was adopted after her dad  of meningitis in his 20s.     Over the course of the past year, she has been doing well without any concerning cardiovascular symptoms.  During the summer and spring, she walks daily and is quite active, but during the winter only does the elliptical --she will try to get in 150 minutes a week.      Her cholesterol from today looks reasonable with LDL of 91 and HDL 62    PAST MEDICAL HISTORY:  Past Medical History:   Diagnosis Date     Allergic rhinitis due to pollen      Other and unspecified hyperlipidemia     Hyperlipidemia     Renal disease     kidney stone       CURRENT MEDICATIONS:  Current Outpatient Medications   Medication Sig Dispense Refill     cetirizine (ZYRTEC) 10 MG tablet TAKE ONE TABLET BY MOUTH EVERY EVENING 90 tablet 3     ibuprofen (ADVIL/MOTRIN) 200 MG tablet Take 200 mg by mouth every 8 hours as needed for mild pain       rosuvastatin (CRESTOR) 20 MG tablet Take 1 tablet (20 mg) by mouth daily 90 tablet 0       PAST SURGICAL HISTORY:  Past Surgical History:   Procedure Laterality Date     C NONSPECIFIC PROCEDURE      microvascular decompression of the right trigeminal nerve     C VAGINAL HYSTERECTOMY      menorhagia; both ovaries remain     CATARACT IOL, RT/LT Bilateral      COLONOSCOPY  2014    hemorrhoids;      COMBINED CYSTOSCOPY, INSERT STENT URETER(S)  12/10/2013     Procedure: COMBINED CYSTOSCOPY, INSERT STENT URETER(S);;  Surgeon: Milton Rangel MD;  [facility]     EXTRACORPOREAL SHOCK WAVE LITHOTRIPSY (ESWL)  12/10/2013    Procedure: EXTRACORPOREAL SHOCK WAVE LITHOTRIPSY (ESWL);  RIGHT EXTRACORPOREAL SHOCK WAVE LITHOTRIPSY, CYSTOSCOPY WITH RIGHT STENT PLACEMENT, RIGHT RETROGRADE;  Surgeon: Milton Rangel MD;  [facility]     EXTRACORPOREAL SHOCK WAVE LITHOTRIPSY (ESWL)  2014    Procedure: EXTRACORPOREAL SHOCK WAVE LITHOTRIPSY (ESWL);  Surgeon: Milton Rangel MD;  [facility]: [facility]     HC COLONOSCOPY THRU STOMA, DIAGNOSTIC  03    normal; diverticulosis     HC LAPAROSCOPY, SURGICAL; CHOLECYSTECTOMY      open procedure     HC REMOVAL OF TONSILS,12+ Y/O      age 21     HCL PAP SMEAR      normal     RELEASE CARPAL TUNNEL  2015     VASCULAR SURGERY      microvascular decompression of trigeminal nerve (MVD)       ALLERGIES  Meperidine hcl and Percodan [aspirin]    FAMILY HX:  Family History   Adopted: Yes   Problem Relation Age of Onset     Heart Disease Mother      Diabetes Maternal Grandfather      Heart Disease Father          age 26; enlarged heart     Diabetes Paternal Aunt      Heart Disease Brother         CABG     Heart Disease Sister        SOCIAL HX:  Social History     Socioeconomic History     Marital status:      Spouse name: None     Number of children: None     Years of education: None     Highest education level: None   Occupational History     None   Social Needs     Financial resource strain: None     Food insecurity:     Worry: None     Inability: None     Transportation needs:     Medical: None     Non-medical: None   Tobacco Use     Smoking status: Never Smoker     Smokeless tobacco: Never Used   Substance and Sexual Activity     Alcohol use: Yes     Alcohol/week: 3.3 standard drinks     Types: 4 Standard drinks or equivalent per week     Comment: 4/week     Drug use: No     Sexual activity: Yes   Lifestyle      "Physical activity:     Days per week: None     Minutes per session: None     Stress: None   Relationships     Social connections:     Talks on phone: None     Gets together: None     Attends Rastafari service: None     Active member of club or organization: None     Attends meetings of clubs or organizations: None     Relationship status: None     Intimate partner violence:     Fear of current or ex partner: None     Emotionally abused: None     Physically abused: None     Forced sexual activity: None   Other Topics Concern      Service Not Asked     Blood Transfusions Not Asked     Caffeine Concern Not Asked     Occupational Exposure Not Asked     Hobby Hazards Not Asked     Sleep Concern Not Asked     Stress Concern Not Asked     Weight Concern Not Asked     Special Diet No     Back Care Not Asked     Exercise Yes     Comment: walking everyday     Bike Helmet Not Asked     Seat Belt Not Asked     Self-Exams Not Asked     Parent/sibling w/ CABG, MI or angioplasty before 65F 55M? Not Asked   Social History Narrative     None       ROS:  Constitutional: No fever, chills, or sweats. No weight gain/loss.   ENT: No visual disturbance, ear ache, epistaxis, sore throat.   Allergies/Immunologic: Negative.   Respiratory: No cough, hemoptysis.   Cardiovascular: As per HPI.   GI: No nausea, vomiting, hematemesis, melena, or hematochezia.   : No urinary frequency, dysuria, or hematuria.   Integument: Negative.   Psychiatric: Negative.   Neuro: Negative.   Endocrinology: Negative.   Musculoskeletal: No myalgia.    VITAL SIGNS:  /72   Pulse 72   Ht 1.575 m (5' 2\")   Wt 60.3 kg (132 lb 14.4 oz)   BMI 24.31 kg/m    Body mass index is 24.31 kg/m .  Wt Readings from Last 2 Encounters:   03/05/20 60.3 kg (132 lb 14.4 oz)   08/14/19 59.7 kg (131 lb 9.6 oz)       PHYSICAL EXAM  Sapna Ibrahim IS A 74 year old female.in no acute distress.  HEENT: Unremarkable.  Neck: JVP normal.  Carotids +4/4 bilaterally without " bruits.  Lungs: CTA.  Cor: RRR. Normal S1 and S2.  No murmur, rub, or gallop.  PMI in Lf 5th ICS.  Abd: Soft, nontender, nondistended.  NABS.  No pulsatile mass.  Extremities: No C/C/E.  Pulses +4/4 symmetric in upper and lower extremities.  Neuro: Grossly intact.    LABS    Lab Results   Component Value Date    WBC 8.6 04/03/2019     Lab Results   Component Value Date    RBC 5.24 04/03/2019     Lab Results   Component Value Date    HGB 16.1 04/03/2019     Lab Results   Component Value Date    HCT 48.8 04/03/2019     No components found for: MCT  Lab Results   Component Value Date    MCV 93.2 04/03/2019     Lab Results   Component Value Date    MCH 30.7 04/03/2019     Lab Results   Component Value Date    MCHC 33.0 04/03/2019     Lab Results   Component Value Date    RDW 12.7 04/03/2019     Lab Results   Component Value Date     04/03/2019      Recent Labs   Lab Test 04/03/19  1136 03/09/18  1102    137   POTASSIUM 4.4 4.1   CHLORIDE 99 101   CO2 24 26   * 89   BUN 17  NOT APPLICABLE 16  NOT APPLICABLE   CR 0.79 0.74   FARRAH 9.9 9.8     Recent Labs   Lab Test 03/04/20  0925 01/24/19  0953  10/13/16  1713 10/15/15  1008   CHOL 201* 182   < > 350* 323*   HDL 62 57   < > 59 58   LDL 91 80   < > 246* 221*   TRIG 238* 227*   < > 225* 221*   CHOLHDLRATIO  --   --   --  5.9* 5.6*   NHDL 139* 125   < >  --   --     < > = values in this interval not displayed.        IMPRESSION/PLAN:   1.  Hyperlipidemia, controlled on treatment.   2.  Hypertriglyceridemia.   3.  History of kidney stones.      DISCUSSION:  It was a pleasure to see Ms. Ibrahim in followup.  Clinically, she is doing well from a cardiovascular standpoint with no concerning symptoms.  She will try to get in 150 minutes a week of exercise.     Will plan to see her back in a year or sooner if there are any issues in the interim.  She understands and is in agreement with the plan as outlined.  All questions were answered.      Please do not  hesitate to contact me with any questions or concerns.     Thank you for allowing me to participate in the care of your patient.    Sincerely,     Shaun Ritchie MD     [facility]

## 2020-03-05 NOTE — PROGRESS NOTES
HISTORY OF PRESENT ILLNESS:    Ms. Ibrahim is a wonderful  75-year-old woman who is way younger than stated age, who I met in 2016 for hyperlipidemia with an LDL up to 250. She started on Crestor, currently at 20mg daily, and fortunately her cholesterol came down nicely.      She has no history of coronary artery disease  but we do not know family history as she was adopted after her dad  of meningitis in his 20s.     Over the course of the past year, she has been doing well without any concerning cardiovascular symptoms.  During the summer and spring, she walks daily and is quite active, but during the winter only does the elliptical --she will try to get in 150 minutes a week.      Her cholesterol from today looks reasonable with LDL of 91 and HDL 62    PAST MEDICAL HISTORY:  Past Medical History:   Diagnosis Date     Allergic rhinitis due to pollen      Other and unspecified hyperlipidemia     Hyperlipidemia     Renal disease     kidney stone       CURRENT MEDICATIONS:  Current Outpatient Medications   Medication Sig Dispense Refill     cetirizine (ZYRTEC) 10 MG tablet TAKE ONE TABLET BY MOUTH EVERY EVENING 90 tablet 3     ibuprofen (ADVIL/MOTRIN) 200 MG tablet Take 200 mg by mouth every 8 hours as needed for mild pain       rosuvastatin (CRESTOR) 20 MG tablet Take 1 tablet (20 mg) by mouth daily 90 tablet 0       PAST SURGICAL HISTORY:  Past Surgical History:   Procedure Laterality Date     C NONSPECIFIC PROCEDURE      microvascular decompression of the right trigeminal nerve     C VAGINAL HYSTERECTOMY      menorhagia; both ovaries remain     CATARACT IOL, RT/LT Bilateral      COLONOSCOPY  2014    hemorrhoids;      COMBINED CYSTOSCOPY, INSERT STENT URETER(S)  12/10/2013    Procedure: COMBINED CYSTOSCOPY, INSERT STENT URETER(S);;  Surgeon: Milton Rangel MD;  Location:  OR     EXTRACORPOREAL SHOCK WAVE LITHOTRIPSY (ESWL)  12/10/2013    Procedure: EXTRACORPOREAL SHOCK WAVE LITHOTRIPSY (ESWL);   RIGHT EXTRACORPOREAL SHOCK WAVE LITHOTRIPSY, CYSTOSCOPY WITH RIGHT STENT PLACEMENT, RIGHT RETROGRADE;  Surgeon: Milton Rangel MD;  Location: [facility]     EXTRACORPOREAL SHOCK WAVE LITHOTRIPSY (ESWL)  2014    Procedure: EXTRACORPOREAL SHOCK WAVE LITHOTRIPSY (ESWL);  Surgeon: Milton Rangel MD;  Location: [facility] OR     HC COLONOSCOPY THRU STOMA, DIAGNOSTIC  03    normal; diverticulosis     HC LAPAROSCOPY, SURGICAL; CHOLECYSTECTOMY      open procedure     HC REMOVAL OF TONSILS,12+ Y/O      age 21     HCL PAP SMEAR      normal     RELEASE CARPAL TUNNEL  2015     VASCULAR SURGERY      microvascular decompression of trigeminal nerve (MVD)       ALLERGIES  Meperidine hcl and Percodan [aspirin]    FAMILY HX:  Family History   Adopted: Yes   Problem Relation Age of Onset     Heart Disease Mother      Diabetes Maternal Grandfather      Heart Disease Father          age 26; enlarged heart     Diabetes Paternal Aunt      Heart Disease Brother         CABG     Heart Disease Sister        SOCIAL HX:  Social History     Socioeconomic History     Marital status:      Spouse name: None     Number of children: None     Years of education: None     Highest education level: None   Occupational History     None   Social Needs     Financial resource strain: None     Food insecurity:     Worry: None     Inability: None     Transportation needs:     Medical: None     Non-medical: None   Tobacco Use     Smoking status: Never Smoker     Smokeless tobacco: Never Used   Substance and Sexual Activity     Alcohol use: Yes     Alcohol/week: 3.3 standard drinks     Types: 4 Standard drinks or equivalent per week     Comment: 4/week     Drug use: No     Sexual activity: Yes   Lifestyle     Physical activity:     Days per week: None     Minutes per session: None     Stress: None   Relationships     Social connections:     Talks on phone: None     Gets together: None     Attends Congregational service: None     Active  "member of club or organization: None     Attends meetings of clubs or organizations: None     Relationship status: None     Intimate partner violence:     Fear of current or ex partner: None     Emotionally abused: None     Physically abused: None     Forced sexual activity: None   Other Topics Concern      Service Not Asked     Blood Transfusions Not Asked     Caffeine Concern Not Asked     Occupational Exposure Not Asked     Hobby Hazards Not Asked     Sleep Concern Not Asked     Stress Concern Not Asked     Weight Concern Not Asked     Special Diet No     Back Care Not Asked     Exercise Yes     Comment: walking everyday     Bike Helmet Not Asked     Seat Belt Not Asked     Self-Exams Not Asked     Parent/sibling w/ CABG, MI or angioplasty before 65F 55M? Not Asked   Social History Narrative     None       ROS:  Constitutional: No fever, chills, or sweats. No weight gain/loss.   ENT: No visual disturbance, ear ache, epistaxis, sore throat.   Allergies/Immunologic: Negative.   Respiratory: No cough, hemoptysis.   Cardiovascular: As per HPI.   GI: No nausea, vomiting, hematemesis, melena, or hematochezia.   : No urinary frequency, dysuria, or hematuria.   Integument: Negative.   Psychiatric: Negative.   Neuro: Negative.   Endocrinology: Negative.   Musculoskeletal: No myalgia.    VITAL SIGNS:  /72   Pulse 72   Ht 1.575 m (5' 2\")   Wt 60.3 kg (132 lb 14.4 oz)   BMI 24.31 kg/m    Body mass index is 24.31 kg/m .  Wt Readings from Last 2 Encounters:   03/05/20 60.3 kg (132 lb 14.4 oz)   08/14/19 59.7 kg (131 lb 9.6 oz)       PHYSICAL EXAM  Sapna Ibrahim IS A 74 year old female.in no acute distress.  HEENT: Unremarkable.  Neck: JVP normal.  Carotids +4/4 bilaterally without bruits.  Lungs: CTA.  Cor: RRR. Normal S1 and S2.  No murmur, rub, or gallop.  PMI in Lf 5th ICS.  Abd: Soft, nontender, nondistended.  NABS.  No pulsatile mass.  Extremities: No C/C/E.  Pulses +4/4 symmetric in upper and lower " extremities.  Neuro: Grossly intact.    LABS    Lab Results   Component Value Date    WBC 8.6 04/03/2019     Lab Results   Component Value Date    RBC 5.24 04/03/2019     Lab Results   Component Value Date    HGB 16.1 04/03/2019     Lab Results   Component Value Date    HCT 48.8 04/03/2019     No components found for: MCT  Lab Results   Component Value Date    MCV 93.2 04/03/2019     Lab Results   Component Value Date    MCH 30.7 04/03/2019     Lab Results   Component Value Date    MCHC 33.0 04/03/2019     Lab Results   Component Value Date    RDW 12.7 04/03/2019     Lab Results   Component Value Date     04/03/2019      Recent Labs   Lab Test 04/03/19  1136 03/09/18  1102    137   POTASSIUM 4.4 4.1   CHLORIDE 99 101   CO2 24 26   * 89   BUN 17  NOT APPLICABLE 16  NOT APPLICABLE   CR 0.79 0.74   FARRAH 9.9 9.8     Recent Labs   Lab Test 03/04/20  0925 01/24/19  0953  10/13/16  1713 10/15/15  1008   CHOL 201* 182   < > 350* 323*   HDL 62 57   < > 59 58   LDL 91 80   < > 246* 221*   TRIG 238* 227*   < > 225* 221*   CHOLHDLRATIO  --   --   --  5.9* 5.6*   NHDL 139* 125   < >  --   --     < > = values in this interval not displayed.        IMPRESSION/PLAN:   1.  Hyperlipidemia, controlled on treatment.   2.  Hypertriglyceridemia.   3.  History of kidney stones.      DISCUSSION:  It was a pleasure to see Ms. Ibrahim in followup.  Clinically, she is doing well from a cardiovascular standpoint with no concerning symptoms.  She will try to get in 150 minutes a week of exercise.     Will plan to see her back in a year or sooner if there are any issues in the interim.  She understands and is in agreement with the plan as outlined.  All questions were answered.      Please do not hesitate to contact me with any questions or concerns.         RAYRAY MONTEJO MD

## 2020-04-13 DIAGNOSIS — E78.2 MIXED HYPERLIPIDEMIA: ICD-10-CM

## 2020-04-13 RX ORDER — ROSUVASTATIN CALCIUM 20 MG/1
20 TABLET, COATED ORAL DAILY
Qty: 90 TABLET | Refills: 2 | Status: SHIPPED | OUTPATIENT
Start: 2020-04-13 | End: 2021-01-05

## 2020-05-22 ENCOUNTER — OFFICE VISIT (OUTPATIENT)
Dept: FAMILY MEDICINE | Facility: CLINIC | Age: 75
End: 2020-05-22

## 2020-05-22 VITALS
HEIGHT: 62 IN | BODY MASS INDEX: 24.22 KG/M2 | OXYGEN SATURATION: 98 % | WEIGHT: 131.6 LBS | DIASTOLIC BLOOD PRESSURE: 70 MMHG | SYSTOLIC BLOOD PRESSURE: 120 MMHG | RESPIRATION RATE: 18 BRPM | HEART RATE: 69 BPM | TEMPERATURE: 97.9 F

## 2020-05-22 DIAGNOSIS — Z13.1 SCREENING FOR DIABETES MELLITUS: ICD-10-CM

## 2020-05-22 DIAGNOSIS — Z01.411 ENCOUNTER FOR GYNECOLOGICAL EXAMINATION WITH ABNORMAL FINDING: Primary | ICD-10-CM

## 2020-05-22 DIAGNOSIS — Z12.11 SPECIAL SCREENING FOR MALIGNANT NEOPLASMS, COLON: ICD-10-CM

## 2020-05-22 DIAGNOSIS — E78.2 MIXED HYPERLIPIDEMIA: ICD-10-CM

## 2020-05-22 LAB
% GRANULOCYTES: 58.4 %
CHOLEST SERPL-MCNC: 187 MG/DL (ref 0–199)
CHOLEST/HDLC SERPL: 3 {RATIO} (ref 0–5)
GLUCOSE SERPL-MCNC: 85 MG/DL (ref 60–99)
HCT VFR BLD AUTO: 47.1 % (ref 35–47)
HDLC SERPL-MCNC: 64 MG/DL (ref 40–150)
HEMOGLOBIN: 16.1 G/DL (ref 11.7–15.7)
LDLC SERPL CALC-MCNC: 76 MG/DL (ref 0–130)
LYMPHOCYTES NFR BLD AUTO: 33.8 %
MCH RBC QN AUTO: 32.2 PG (ref 26–33)
MCHC RBC AUTO-ENTMCNC: 34.5 G/DL (ref 31–36)
MCV RBC AUTO: 94.2 FL (ref 78–100)
MONOCYTES NFR BLD AUTO: 7.8 %
PLATELET COUNT - QUEST: 262 10^9/L (ref 150–375)
RBC # BLD AUTO: 5 10*12/L (ref 3.8–5.2)
TRIGL SERPL-MCNC: 235 MG/DL (ref 0–149)
WBC # BLD AUTO: 9 10*9/L (ref 4–11)

## 2020-05-22 PROCEDURE — 82947 ASSAY GLUCOSE BLOOD QUANT: CPT | Performed by: FAMILY MEDICINE

## 2020-05-22 PROCEDURE — 80061 LIPID PANEL: CPT | Performed by: FAMILY MEDICINE

## 2020-05-22 PROCEDURE — 85025 COMPLETE CBC W/AUTO DIFF WBC: CPT | Performed by: FAMILY MEDICINE

## 2020-05-22 PROCEDURE — 99212 OFFICE O/P EST SF 10 MIN: CPT | Mod: 25 | Performed by: FAMILY MEDICINE

## 2020-05-22 PROCEDURE — 36415 COLL VENOUS BLD VENIPUNCTURE: CPT | Performed by: FAMILY MEDICINE

## 2020-05-22 PROCEDURE — 99397 PER PM REEVAL EST PAT 65+ YR: CPT | Mod: GA | Performed by: FAMILY MEDICINE

## 2020-05-22 SDOH — HEALTH STABILITY: MENTAL HEALTH: HOW OFTEN DO YOU HAVE A DRINK CONTAINING ALCOHOL?: 2-3 TIMES A WEEK

## 2020-05-22 SDOH — HEALTH STABILITY: MENTAL HEALTH: HOW MANY STANDARD DRINKS CONTAINING ALCOHOL DO YOU HAVE ON A TYPICAL DAY?: 1 OR 2

## 2020-05-22 SDOH — HEALTH STABILITY: MENTAL HEALTH: HOW OFTEN DO YOU HAVE 6 OR MORE DRINKS ON ONE OCCASION?: NEVER

## 2020-05-22 ASSESSMENT — MIFFLIN-ST. JEOR: SCORE: 1045.18

## 2020-05-22 NOTE — NURSING NOTE
Patient is here for a full physical exam.    Pre-Visit Screening :  Immunizations : up to date  Colon Screening : is due and ordered today  Mammogram: up to date  Asthma Action Test/Plan : NA  PHQ9 :  PHQ 2 done today  GAD7 :  NA  Patient's  BMI Body mass index is 24.07 kg/m .  Questioned patient about current smoking habits.  Pt. has never smoked.  OK to leave a detailed voice message regarding today's visit Yes, phone # [phone]      ETOH screening: addressed in history

## 2020-05-22 NOTE — PROGRESS NOTES
Signed waiver for preventative PE.  Two notes    1. PE  2. Hyperlipidemia    1.   SUBJECTIVE:  Sapna Ibrahim is an 75 year old  postmenopausal woman   who presents for annual gyn exam. Menopause at age 50's. No   bleeding, spotting, or discharge noted.     Estrogen replacement therapy: none currently, has taken in past  MAGALIS exposure: no  History of abnormal Pap smear: No  Family history of uterine or ovarian cancer: No  Regular self breast exam: No  History of abnormal mammogram: No  Family history of breast cancer: No  History of abnormal lipids: Yes: Crestor/ see second note    Past Medical History:  No date: Allergic rhinitis due to pollen  No date: Other and unspecified hyperlipidemia      Comment:  Hyperlipidemia  No date: Renal disease      Comment:  kidney stone    Review of patient's family history indicates:  Adopted:  Yes  Problem: Heart Disease      Relation: Mother          Age of Onset: (Not Specified)  Problem: Diabetes      Relation: Maternal Grandfather          Age of Onset: (Not Specified)  Problem: Heart Disease      Relation: Father          Age of Onset: (Not Specified)          Comment:  age 26; enlarged heart  Problem: Diabetes      Relation: Paternal Aunt          Age of Onset: (Not Specified)  Problem: Heart Disease      Relation: Brother          Age of Onset: (Not Specified)          Comment: CABG  Problem: Heart Disease      Relation: Sister          Age of Onset: (Not Specified)      Past Surgical History:  1994: C NONSPECIFIC PROCEDURE      Comment:  microvascular decompression of the right trigeminal                nerve  : C VAGINAL HYSTERECTOMY      Comment:  menorhagia; both ovaries remain  No date: CATARACT IOL, RT/LT; Bilateral  2014: COLONOSCOPY      Comment:  hemorrhoids;   12/10/2013: COMBINED CYSTOSCOPY, INSERT STENT URETER(S)      Comment:  Procedure: COMBINED CYSTOSCOPY, INSERT STENT URETER(S);;               Surgeon: Milton Rangel MD;  Location:   "OR  12/10/2013: EXTRACORPOREAL SHOCK WAVE LITHOTRIPSY (ESWL)      Comment:  Procedure: EXTRACORPOREAL SHOCK WAVE LITHOTRIPSY (ESWL);               RIGHT EXTRACORPOREAL SHOCK WAVE LITHOTRIPSY, CYSTOSCOPY                WITH RIGHT STENT PLACEMENT, RIGHT RETROGRADE;  Surgeon:                Milton Rangel MD;  Location:  OR  5/13/2014: EXTRACORPOREAL SHOCK WAVE LITHOTRIPSY (ESWL)      Comment:  Procedure: EXTRACORPOREAL SHOCK WAVE LITHOTRIPSY (ESWL);               Surgeon: Milton Rangel MD;  Location:  OR  7/8/03:  COLONOSCOPY THRU STOMA, DIAGNOSTIC      Comment:  normal; diverticulosis  1996:  LAPAROSCOPY, SURGICAL; CHOLECYSTECTOMY      Comment:  open procedure  No date:  REMOVAL OF TONSILS,12+ Y/O      Comment:  age 21  1999: HCL PAP SMEAR      Comment:  normal  12/2015: RELEASE CARPAL TUNNEL  1994: VASCULAR SURGERY      Comment:  microvascular decompression of trigeminal nerve (MVD)    Current Outpatient Medications:  rosuvastatin (CRESTOR) 20 MG tablet  cetirizine (ZYRTEC) 10 MG tablet  ibuprofen (ADVIL/MOTRIN) 200 MG tablet    No current facility-administered medications for this visit.      -- Meperidine Hcl     --  sick to stomache   -- Percodan [Aspirin]     Social History    Tobacco Use      Smoking status: Never Smoker      Smokeless tobacco: Never Used    Alcohol use: Yes      Alcohol/week: 3.3 standard drinks      Types: 4 Standard drinks or equivalent per week      Frequency: 2-3 times a week      Drinks per session: 1 or 2      Binge frequency: Never      Review Of Systems  Ears/Nose/Throat: allergies fall  Respiratory: No shortness of breath, dyspnea on exertion, cough, or hemoptysis  Cardiovascular: cody cholesterol  Gastrointestinal: negative  Genitourinary: negative    OBJECTIVE:  /70 (BP Location: Right arm, Patient Position: Sitting, Cuff Size: Adult Large)   Pulse 69   Temp 97.9  F (36.6  C) (Oral)   Ht 1.575 m (5' 2\")   Wt 59.7 kg (131 lb 9.6 oz)   SpO2 98%   BMI 24.07 " kg/m    General appearance: healthy, alert, no distress and cooperative  Skin: Skin color, texture, turgor normal. No rashes or lesions.  Ears: negative  Nose/Sinuses: Nares normal. Septum midline. Mucosa normal. No drainage or sinus tenderness.  Oropharynx: Lips, mucosa, and tongue normal. Teeth and gums normal.  Neck: Neck supple. No adenopathy. Thyroid symmetric, normal size,, Carotids without bruits.  Lungs: negative, Percussion normal. Good diaphragmatic excursion. Lungs clear  Heart: negative, PMI normal. No lifts, heaves, or thrills. RRR. No murmurs, clicks gallops or rub  Breasts: Inspection negative. No nipple discharge or bleeding. No masses.  Abdomen: Abdomen soft, non-tender. BS normal. No masses, organomegaly  Pelvic: Deferred  BMI : Body mass index is 24.07 kg/m .    ASSESSMENT:  (Z01.411) Encounter for gynecological examination with abnormal finding  (primary encounter diagnosis)  Plan: CL AFF HEMOGRAM/PLATE/DIFF (BFP), VENOUS         COLLECTION        Colonoscopy recommended  Exercise encouraged  Fasting lipid profile obtained  Flu shot recommended  Follow up in 1 year  Glucose obtained  Mammogram recommended  Monitor size and characteristics of moles  Routine breast self-exam encouraged  Seat belt use encouraged  Sunscreen recommended      (E78.2) Mixed hyperlipidemia  Plan: Lipid Panel (BFP), VENOUS COLLECTION        I have reviewed the patient's medical history in detail and updated the computerized patient record.      (Z12.11) Special screening for malignant neoplasms, colon  Plan: schedule    (Z13.1) Screening for diabetes mellitus  Plan: Glucose Fasting (BFP), VENOUS COLLECTION              Dx:  1)  Pap smear, mammogram  2)  Lipids at appropriate intervals  Rx:  PE:  Reviewed health maintenance including diet, regular exercise,   estrogen replacement and periodic exams.    2.   SUBJECTIVE:  Sapna Ibrahim is an 75 year old female who presents for evaluation of   hyperlipidemia. She has been  "diagnosed in the past as having   elevated cholesterol only. She indicates that she is feeling well   and denies any symptoms of cardiovascular disease. Specifically   denies chest pain, palpitations, dyspnea, orthopnea, PND,   claudication or peripheral edema. Treatment modalities employed to   this point include diet, regular aerobic exercise and Crestor. Current medication   regimen is as listed below. Patient denies any side effects of   medication.    Family history: negative for hypertension, diabetes, or cardiovascular disease  Age at diagnosis of hyperlipidemia: 71  Cardiovascular risk factors: lipids    Current Outpatient Medications   Medication     rosuvastatin (CRESTOR) 20 MG tablet     cetirizine (ZYRTEC) 10 MG tablet     ibuprofen (ADVIL/MOTRIN) 200 MG tablet     No current facility-administered medications for this visit.      Allergies   Allergen Reactions     Meperidine Hcl      sick to stomache     Percodan [Aspirin]        Social History     Tobacco Use     Smoking status: Never Smoker     Smokeless tobacco: Never Used   Substance Use Topics     Alcohol use: Yes     Alcohol/week: 3.3 standard drinks     Types: 4 Standard drinks or equivalent per week     Frequency: 2-3 times a week     Drinks per session: 1 or 2     Binge frequency: Never       OBJECTIVE:  /70 (BP Location: Right arm, Patient Position: Sitting, Cuff Size: Adult Large)   Pulse 69   Temp 97.9  F (36.6  C) (Oral)   Ht 1.575 m (5' 2\")   Wt 59.7 kg (131 lb 9.6 oz)   SpO2 98%   BMI 24.07 kg/m    Repeat BP R arm seated = 120/70 with regular size cuff.  Skin: negative  Fundi: deferred  Lungs: negative, Percussion normal. Good diaphragmatic excursion. Lungs clear  Heart: negative, PMI normal. No lifts, heaves, or thrills. RRR. No murmurs, clicks gallops or rub  Peripheral pulses: radial=4/4, femoral=4/4, popliteal=4/4, dorsalis pedis=4/4,      "

## 2020-05-22 NOTE — PATIENT INSTRUCTIONS
Colonoscopy recommended  Exercise encouraged  Fasting lipid profile obtained  Flu shot recommended  Follow up in 1 year  Glucose obtained  Mammogram recommended  Monitor size and characteristics of moles  Routine breast self-exam encouraged  Seat belt use encouraged  Sunscreen recommended

## 2020-08-05 ENCOUNTER — TRANSFERRED RECORDS (OUTPATIENT)
Dept: FAMILY MEDICINE | Facility: CLINIC | Age: 75
End: 2020-08-05

## 2020-11-25 ENCOUNTER — HOSPITAL ENCOUNTER (OUTPATIENT)
Dept: MAMMOGRAPHY | Facility: CLINIC | Age: 75
Discharge: HOME | End: 2020-11-25
Attending: FAMILY MEDICINE | Admitting: FAMILY MEDICINE
Payer: COMMERCIAL

## 2020-11-25 DIAGNOSIS — Z12.31 VISIT FOR SCREENING MAMMOGRAM: ICD-10-CM

## 2020-11-25 PROCEDURE — 77067 SCR MAMMO BI INCL CAD: CPT

## 2021-01-05 DIAGNOSIS — E78.2 MIXED HYPERLIPIDEMIA: ICD-10-CM

## 2021-01-05 RX ORDER — ROSUVASTATIN CALCIUM 20 MG/1
20 TABLET, COATED ORAL DAILY
Qty: 90 TABLET | Refills: 0 | Status: SHIPPED | OUTPATIENT
Start: 2021-01-05 | End: 2021-04-06

## 2021-01-09 ENCOUNTER — HEALTH MAINTENANCE LETTER (OUTPATIENT)
Age: 76
End: 2021-01-09

## 2021-04-06 DIAGNOSIS — E78.2 MIXED HYPERLIPIDEMIA: ICD-10-CM

## 2021-04-06 RX ORDER — ROSUVASTATIN CALCIUM 20 MG/1
20 TABLET, COATED ORAL DAILY
Qty: 90 TABLET | Refills: 0 | Status: SHIPPED | OUTPATIENT
Start: 2021-04-06 | End: 2021-07-09

## 2021-05-14 ENCOUNTER — APPOINTMENT (OUTPATIENT)
Dept: CT IMAGING | Facility: CLINIC | Age: 76
End: 2021-05-14
Attending: EMERGENCY MEDICINE
Payer: COMMERCIAL

## 2021-05-14 ENCOUNTER — HOSPITAL ENCOUNTER (EMERGENCY)
Facility: CLINIC | Age: 76
Discharge: HOME | End: 2021-05-14
Attending: EMERGENCY MEDICINE | Admitting: EMERGENCY MEDICINE
Payer: COMMERCIAL

## 2021-05-14 VITALS
SYSTOLIC BLOOD PRESSURE: 139 MMHG | OXYGEN SATURATION: 99 % | RESPIRATION RATE: 18 BRPM | DIASTOLIC BLOOD PRESSURE: 66 MMHG | TEMPERATURE: 98.7 F | HEART RATE: 79 BPM

## 2021-05-14 DIAGNOSIS — K52.9 COLITIS: ICD-10-CM

## 2021-05-14 DIAGNOSIS — K92.1 HEMATOCHEZIA: ICD-10-CM

## 2021-05-14 LAB
ABO + RH BLD: NORMAL
ABO + RH BLD: NORMAL
ALBUMIN SERPL-MCNC: 3.8 G/DL (ref 3.4–5)
ALP SERPL-CCNC: 62 U/L (ref 40–150)
ALT SERPL W P-5'-P-CCNC: 27 U/L (ref 0–50)
ANION GAP SERPL CALCULATED.3IONS-SCNC: 4 MMOL/L (ref 3–14)
AST SERPL W P-5'-P-CCNC: 20 U/L (ref 0–45)
BASOPHILS # BLD AUTO: 0 10E9/L (ref 0–0.2)
BASOPHILS NFR BLD AUTO: 0.2 %
BILIRUB SERPL-MCNC: 0.9 MG/DL (ref 0.2–1.3)
BLD GP AB SCN SERPL QL: NORMAL
BLOOD BANK CMNT PATIENT-IMP: NORMAL
BUN SERPL-MCNC: 20 MG/DL (ref 7–30)
CALCIUM SERPL-MCNC: 8.7 MG/DL (ref 8.5–10.1)
CHLORIDE SERPL-SCNC: 106 MMOL/L (ref 94–109)
CO2 SERPL-SCNC: 27 MMOL/L (ref 20–32)
CREAT SERPL-MCNC: 0.82 MG/DL (ref 0.52–1.04)
DIFFERENTIAL METHOD BLD: ABNORMAL
EOSINOPHIL # BLD AUTO: 0 10E9/L (ref 0–0.7)
EOSINOPHIL NFR BLD AUTO: 0.2 %
ERYTHROCYTE [DISTWIDTH] IN BLOOD BY AUTOMATED COUNT: 13.2 % (ref 10–15)
GFR SERPL CREATININE-BSD FRML MDRD: 69 ML/MIN/{1.73_M2}
GLUCOSE SERPL-MCNC: 115 MG/DL (ref 70–99)
HCT VFR BLD AUTO: 47.7 % (ref 35–47)
HGB BLD-MCNC: 15.9 G/DL (ref 11.7–15.7)
IMM GRANULOCYTES # BLD: 0.1 10E9/L (ref 0–0.4)
IMM GRANULOCYTES NFR BLD: 0.5 %
LYMPHOCYTES # BLD AUTO: 1.6 10E9/L (ref 0.8–5.3)
LYMPHOCYTES NFR BLD AUTO: 10.7 %
MCH RBC QN AUTO: 30.6 PG (ref 26.5–33)
MCHC RBC AUTO-ENTMCNC: 33.3 G/DL (ref 31.5–36.5)
MCV RBC AUTO: 92 FL (ref 78–100)
MONOCYTES # BLD AUTO: 0.7 10E9/L (ref 0–1.3)
MONOCYTES NFR BLD AUTO: 4.9 %
NEUTROPHILS # BLD AUTO: 12.5 10E9/L (ref 1.6–8.3)
NEUTROPHILS NFR BLD AUTO: 83.5 %
NRBC # BLD AUTO: 0 10*3/UL
NRBC BLD AUTO-RTO: 0 /100
PLATELET # BLD AUTO: 280 10E9/L (ref 150–450)
POTASSIUM SERPL-SCNC: 3.8 MMOL/L (ref 3.4–5.3)
PROT SERPL-MCNC: 7.3 G/DL (ref 6.8–8.8)
RBC # BLD AUTO: 5.2 10E12/L (ref 3.8–5.2)
SODIUM SERPL-SCNC: 137 MMOL/L (ref 133–144)
SPECIMEN EXP DATE BLD: NORMAL
WBC # BLD AUTO: 15 10E9/L (ref 4–11)

## 2021-05-14 PROCEDURE — 85025 COMPLETE CBC W/AUTO DIFF WBC: CPT | Performed by: EMERGENCY MEDICINE

## 2021-05-14 PROCEDURE — 86901 BLOOD TYPING SEROLOGIC RH(D): CPT | Performed by: EMERGENCY MEDICINE

## 2021-05-14 PROCEDURE — 99285 EMERGENCY DEPT VISIT HI MDM: CPT | Mod: 25

## 2021-05-14 PROCEDURE — 80053 COMPREHEN METABOLIC PANEL: CPT | Performed by: EMERGENCY MEDICINE

## 2021-05-14 PROCEDURE — 86900 BLOOD TYPING SEROLOGIC ABO: CPT | Performed by: EMERGENCY MEDICINE

## 2021-05-14 PROCEDURE — 250N000011 HC RX IP 250 OP 636: Performed by: EMERGENCY MEDICINE

## 2021-05-14 PROCEDURE — 86850 RBC ANTIBODY SCREEN: CPT | Performed by: EMERGENCY MEDICINE

## 2021-05-14 PROCEDURE — 74177 CT ABD & PELVIS W/CONTRAST: CPT

## 2021-05-14 RX ORDER — IOPAMIDOL 755 MG/ML
500 INJECTION, SOLUTION INTRAVASCULAR ONCE
Status: COMPLETED | OUTPATIENT
Start: 2021-05-14 | End: 2021-05-14

## 2021-05-14 RX ADMIN — IOPAMIDOL 65 ML: 755 INJECTION, SOLUTION INTRAVENOUS at 06:16

## 2021-05-14 ASSESSMENT — ENCOUNTER SYMPTOMS
FEVER: 0
ANAL BLEEDING: 1
WOUND: 0
VOMITING: 0
DIAPHORESIS: 1
CHILLS: 0
NAUSEA: 1

## 2021-05-14 NOTE — ED PROVIDER NOTES
History   Chief Complaint  Rectal Bleeding    HPI  Sapna Ibrahim is a 76 year old female with a history of hyperlipidemia who presents for evaluation of rectal bleeding. The patient reports that she woke up and had a bowel movement at around 2330 this evening. She didn't look at the stool and it was dark in the room at the time; however, she proceeded to have several subsequent bowel movements, all of which were only blood. Each of these episodes was associated with nausea and diaphoresis. She has never had this before. Denies any vomiting, fevers, chills, or abnormal bruising. Denies any history of anemia or diverticulitis.     Review of Systems   Constitutional: Positive for diaphoresis. Negative for chills and fever.   Gastrointestinal: Positive for anal bleeding and nausea. Negative for vomiting.   Skin: Negative for wound.   All other systems reviewed and are negative.    Allergies  Meperidine Hcl  Percodan     Medications  Crestor    Past Medical History  Hyperlipidemia  Dermatitis  Kidney stone    Past Surgical History  Hysterectomy  Colonoscopy  Combined cystoscopy  Shock wave lithotripsy  Cholecystectomy    Family History  Heart disease    Social History  Presents to the ED alone.     Physical Exam     Patient Vitals for the past 24 hrs:   BP Temp Temp src Pulse Resp SpO2   05/14/21 0600 (!) 145/75 -- -- 71 -- 95 %   05/14/21 0545 (!) 146/72 -- -- 69 -- 96 %   05/14/21 0527 (!) 159/92 98.7  F (37.1  C) Temporal 80 20 98 %     Physical Exam  General: Elderly female sitting upright  Eyes: PERRL, Conjunctive within normal limits  ENT: Moist mucous membranes, oropharynx clear.   CV: Normal S1S2, no murmur, rub or gallop. Regular rate and rhythm  Resp: Clear to auscultation bilaterally, no wheezes, rales or rhonchi. Normal respiratory effort.  GI: Abdomen is soft and nondistended. LLQ tenderness to palpation. No palpable masses. No rebound or guarding.  Rectal: No visible external bleeding, fissure or  hemorrhoid. Nontender digital examination. Small amount of blood streaked mucous in the rectal vault. No stool.   MSK: No edema. Nontender. Normal active range of motion.  Skin: Warm and dry. No rashes or lesions or ecchymoses on visible skin.  Neuro: Alert and oriented. Responds appropriately to all questions and commands. No focal findings appreciated. Normal muscle tone.  Psych: Normal mood and affect. Pleasant.    Emergency Department Course   Imaging:  CT Abdomen/Pelvis with IV contrast:   1.  Although the colon is collapsed there is suggestion of some mild wall thickening involving the distal descending colon and splenic flexure suggesting the possibility of segmental colitis.   2.  Colonic diverticulosis without definitive evidence for active diverticulitis.   3.  Diffuse fatty infiltration of the liver.   4.  Nonobstructing 3 mm calculus right kidney. As per radiology.    Laboratory:  CBC: WBC: 15.0 (H), HGB: 15.9 (H), PLT: 280  CMP: Glucose 115 (H), o/w WNL (Creatinine: 0.82)  ABO RH Type and Screen: A, antibody neg    Emergency Department Course:  Reviewed:  I reviewed nursing notes, vitals and past medical history    Assessments:  0534 I physically examined the patient as documented above.    0649 I rechecked the patient. She denies any new concerns. Denies current significant pain. Went to bathroom and had a tiny amount of blood, no stool.     Disposition:  The patient was discharged to home.     Impression & Plan   Medical Decision Making:  Sapna Ibrahim is a 76 year old female who presents with rectal bleeding. I considered a broad differential diagnosis for this patient including upper GIB (ulcer, gastritis, avm, tumor, etc) vs lower GIB (tumor, diverticulosis, avm, meckels, etc), colitis, aortoenteric fistula, hemorrhoids, fissures,  Ischemic colitis, bacterial stool infection (salmonella, shigella, e. Coli, campylobacter).  The workup in the ED is consistent with gastrointestinal bleeding, source  unclear, but likely inflammatory due to colitis. I favor lower at this time due to lack of vomiting, lack of preceding black stools, no epigastric pain, no history of ulcers or NSAID use.  Pt has no concerning symptoms of significant blood loss such as CP, SOB, LH.  She has no risk factors for bacterial cause of colitis, antibiotics not indicated at this time.  She had no diarrhea.  She had minimal bleeding here in the emergency department.  As bleeding is small and vitals/hgb are stable, outpatient management is indicated and patient is agreeable to this plan.. Will have them see primary tomorrow and consider colonoscopy when symptoms improve.  Return if massive bleeding, more than 2 more bloody stools, lightheaded when stands, worsening or new abdominal pain, fever.     Diagnosis:    ICD-10-CM    1. Hematochezia  K92.1    2. Colitis  K52.9        Scribe Disclosure:  I, Preston Hemphill, am serving as a scribe at 5:41 AM on 5/14/2021 to document services personally performed by Virginia Dorantes MD based on my observations and the provider's statements to me.      Virginia Dorantes MD  05/14/21 0608

## 2021-05-18 ENCOUNTER — OFFICE VISIT (OUTPATIENT)
Dept: FAMILY MEDICINE | Facility: CLINIC | Age: 76
End: 2021-05-18

## 2021-05-18 VITALS
RESPIRATION RATE: 22 BRPM | WEIGHT: 128 LBS | SYSTOLIC BLOOD PRESSURE: 124 MMHG | HEIGHT: 62 IN | BODY MASS INDEX: 23.55 KG/M2 | OXYGEN SATURATION: 99 % | HEART RATE: 65 BPM | DIASTOLIC BLOOD PRESSURE: 72 MMHG | TEMPERATURE: 97.8 F

## 2021-05-18 DIAGNOSIS — K52.9 COLITIS: ICD-10-CM

## 2021-05-18 DIAGNOSIS — K92.1 HEMATOCHEZIA: Primary | ICD-10-CM

## 2021-05-18 LAB — HEMOGLOBIN: 15.6 G/DL (ref 11.7–15.7)

## 2021-05-18 PROCEDURE — 36415 COLL VENOUS BLD VENIPUNCTURE: CPT | Performed by: FAMILY MEDICINE

## 2021-05-18 PROCEDURE — 85018 HEMOGLOBIN: CPT | Performed by: FAMILY MEDICINE

## 2021-05-18 PROCEDURE — 99214 OFFICE O/P EST MOD 30 MIN: CPT | Performed by: FAMILY MEDICINE

## 2021-05-18 ASSESSMENT — MIFFLIN-ST. JEOR: SCORE: 1023.85

## 2021-05-18 NOTE — NURSING NOTE
Chief Complaint   Patient presents with     Hospital F/U     rectal bleeding, everything has now resolved and patient has no other concerns

## 2021-05-18 NOTE — PROGRESS NOTES
Assessment & Plan     (K92.1) Hematochezia  (primary encounter diagnosis)  Plan: HEMOGLOBIN (BFP), VENOUS COLLECTION,         GASTROENTEROLOGY ADULT REF CONSULT ONLY        resolved    (K52.9) Colitis  Plan: GASTROENTEROLOGY ADULT REF CONSULT ONLY        Consult pending        30 minutes spent on the date of the encounter doing chart review, history and exam, documentation and further activities per the note       CONSULTATION/REFERRAL to GI  SELF MONITORING:       - bleeding in stool or abdominal symptoms        Little Casas MD  [facility]    Subjective   Sapna is a 76 year old who presents for the following health issues     HPI recent Er visit with bright red blood per stool resolved/ CT shoed descending colitis/ bleeding stopped quickly and HGB stable      Hospital Follow-up Visit:    Hospital/Nursing Home/IP Rehab Facility: [facility]  Date of Admission: 5/14/21  Date of Discharge: 5/14/21  Reason(s) for Admission: Rectal bleeding, colitis      Was your hospitalization related to COVID-19? No   Problems taking medications regularly:  None  Medication changes since discharge: None  Problems adhering to non-medication therapy:  None    Summary of hospitalization:  [facility] discharge summary reviewed  Diagnostic Tests/Treatments reviewed.  Follow up needed: To GI/ last colonoscopy reviewed  Other Healthcare Providers Involved in Patient s Care:         Specialist appointment - GI  Update since discharge: improved. Post Discharge Medication Reconciliation: discharge medications reconciled, continue medications without change.  Plan of care communicated with patient          Review of Systems   Constitutional, HEENT, cardiovascular, pulmonary, gi and gu systems are negative, except as otherwise noted.      Objective    /72 (BP Location: Right arm, Patient Position: Sitting, Cuff Size: Adult Regular)   Pulse 65   Temp 97.8  F (36.6  C) (Temporal)   Resp 22    "Ht 1.575 m (5' 2\")   Wt 58.1 kg (128 lb)   SpO2 99%   BMI 23.41 kg/m    Body mass index is 23.41 kg/m .  Physical Exam   GENERAL: healthy, alert and no distress  NECK: no adenopathy, no asymmetry, masses, or scars and thyroid normal to palpation  RESP: lungs clear to auscultation - no rales, rhonchi or wheezes  CV: regular rate and rhythm, normal S1 S2, no S3 or S4, no murmur, click or rub, no peripheral edema and peripheral pulses strong  ABDOMEN: soft, nontender, no hepatosplenomegaly, no masses and bowel sounds normal  MS: no gross musculoskeletal defects noted, no edema    HGB: stable at 15.6 gm/dl        Total time spent with patient today including visit and non face to face time 30 minutes.                "

## 2021-07-03 ENCOUNTER — HEALTH MAINTENANCE LETTER (OUTPATIENT)
Age: 76
End: 2021-07-03

## 2021-07-07 ENCOUNTER — TELEPHONE (OUTPATIENT)
Dept: CARDIOLOGY | Facility: CLINIC | Age: 76
End: 2021-07-07

## 2021-07-07 DIAGNOSIS — E78.2 MIXED HYPERLIPIDEMIA: Primary | ICD-10-CM

## 2021-07-07 NOTE — TELEPHONE ENCOUNTER
Received call from pt requesting to schedule follow up with Dr. Ritchie. Pt stated she has [employer] insurance and is aware our clinic is now out of network, however she already spoke with her insurance requested a continuity of care exception. Scheduled pt for labs on 21 and visit on 21 with Dr. Ritchie. Lab orders , new orders placed.

## 2021-07-09 DIAGNOSIS — E78.2 MIXED HYPERLIPIDEMIA: ICD-10-CM

## 2021-07-09 RX ORDER — ROSUVASTATIN CALCIUM 20 MG/1
20 TABLET, COATED ORAL DAILY
Qty: 90 TABLET | Refills: 0 | Status: SHIPPED | OUTPATIENT
Start: 2021-07-09 | End: 2021-10-07

## 2021-07-15 ENCOUNTER — TRANSFERRED RECORDS (OUTPATIENT)
Dept: FAMILY MEDICINE | Facility: CLINIC | Age: 76
End: 2021-07-15

## 2021-09-14 ENCOUNTER — TELEPHONE (OUTPATIENT)
Dept: CARDIOLOGY | Facility: CLINIC | Age: 76
End: 2021-09-14

## 2021-09-14 NOTE — TELEPHONE ENCOUNTER
Called pt and notified her that her upcoming appointments with Dr. Ritchie need to be cancelled due to our clinic is no longer a participating provider to her insurance plan. Requested pt contact [facility] for a participating provider. Pt verbalized understanding. Pt asked if she can get another refill of her rosuvastatin if she is not able to be seen by a new provider before she runs out. Requested in that case pt contact [employer] and will authorize an additional 90 day supply. Pt verbalized agreement with plan. Reviewed pt can check the [employer] website for in network Medicare plans when pt is signing up for her plan next year if she would like to have coverage with [employer] in 2022.

## 2021-10-07 ENCOUNTER — TELEPHONE (OUTPATIENT)
Dept: CARDIOLOGY | Facility: CLINIC | Age: 76
End: 2021-10-07
Payer: COMMERCIAL

## 2021-10-07 DIAGNOSIS — E78.2 MIXED HYPERLIPIDEMIA: ICD-10-CM

## 2021-10-07 RX ORDER — ROSUVASTATIN CALCIUM 20 MG/1
20 TABLET, COATED ORAL DAILY
Qty: 90 TABLET | Refills: 1 | Status: SHIPPED | OUTPATIENT
Start: 2021-10-07 | End: 2022-04-20

## 2021-10-07 NOTE — TELEPHONE ENCOUNTER
Spoke with pt about needing a refill on her rosuvastatin.   Pt has [employer] insurance and needs some refills before switching her insurance.   Pt will switch her insurance and then make an appt to see Dr. Ritchie.   Gave pt [facility]'s call back number to call once she has switched her insurance and needs to make an appt with labs.   Pt gave verbal understanding.

## 2021-10-23 ENCOUNTER — HEALTH MAINTENANCE LETTER (OUTPATIENT)
Age: 76
End: 2021-10-23

## 2022-01-04 ENCOUNTER — LAB (OUTPATIENT)
Dept: LAB | Facility: CLINIC | Age: 77
End: 2022-01-04
Payer: COMMERCIAL

## 2022-01-04 DIAGNOSIS — E78.2 MIXED HYPERLIPIDEMIA: ICD-10-CM

## 2022-01-04 LAB
ALT SERPL W P-5'-P-CCNC: 21 U/L (ref 0–50)
CHOLEST SERPL-MCNC: 190 MG/DL
FASTING STATUS PATIENT QL REPORTED: YES
HDLC SERPL-MCNC: 56 MG/DL
LDLC SERPL CALC-MCNC: 85 MG/DL
NONHDLC SERPL-MCNC: 134 MG/DL
TRIGL SERPL-MCNC: 245 MG/DL

## 2022-01-04 PROCEDURE — 36415 COLL VENOUS BLD VENIPUNCTURE: CPT | Performed by: INTERNAL MEDICINE

## 2022-01-04 PROCEDURE — 80061 LIPID PANEL: CPT | Performed by: INTERNAL MEDICINE

## 2022-01-04 PROCEDURE — 84460 ALANINE AMINO (ALT) (SGPT): CPT | Performed by: INTERNAL MEDICINE

## 2022-01-06 ENCOUNTER — OFFICE VISIT (OUTPATIENT)
Dept: CARDIOLOGY | Facility: CLINIC | Age: 77
End: 2022-01-06
Payer: COMMERCIAL

## 2022-01-06 VITALS
DIASTOLIC BLOOD PRESSURE: 80 MMHG | SYSTOLIC BLOOD PRESSURE: 132 MMHG | HEART RATE: 66 BPM | BODY MASS INDEX: 23.92 KG/M2 | HEIGHT: 62 IN | WEIGHT: 130 LBS

## 2022-01-06 DIAGNOSIS — E78.2 MIXED HYPERLIPIDEMIA: Primary | ICD-10-CM

## 2022-01-06 PROCEDURE — 99214 OFFICE O/P EST MOD 30 MIN: CPT | Performed by: INTERNAL MEDICINE

## 2022-01-06 ASSESSMENT — MIFFLIN-ST. JEOR: SCORE: 1032.93

## 2022-01-06 NOTE — LETTER
2022    Little Casas MD  [address], [address]  [facility] [address]    RE: Sapna Mckeonmercy       Dear Colleague,     I had the pleasure of seeing Sapna Ibrahim in the [facility].  CARDIOLOGY CONSULTATION:    Ms. Ibrahim is a wonderful  76-year-old woman who looks and acts way younger than stated age, who I met in 2016 for hyperlipidemia with an LDL up to 250. She started on Crestor, currently at 20mg daily, and fortunately her cholesterol came down nicely.       She has no history of coronary artery disease but we do not know family history as she was adopted after her dad  of meningitis in his 20s.      Over the course of the past year, she has been doing well without any concerning cardiovascular symptoms.  She walks daily and is quite active; she calculated that she gets about 170 miles a month!       Her cholesterol from today looks reasonable with LDL of 85.  She lost her significant other this past Friday, which is sad.  He was suffering from cancer.  Her two children are doing well.  Her daughter, Carina, who is a nurse here at the hospital, is stable with her MS.     PAST MEDICAL HISTORY:  Past Medical History:   Diagnosis Date     Allergic rhinitis due to pollen      Other and unspecified hyperlipidemia     Hyperlipidemia     Renal disease     kidney stone       CURRENT MEDICATIONS:  Current Outpatient Medications   Medication Sig Dispense Refill     cetirizine (ZYRTEC) 10 MG tablet TAKE ONE TABLET BY MOUTH EVERY EVENING 90 tablet 3     rosuvastatin (CRESTOR) 20 MG tablet Take 1 tablet (20 mg) by mouth daily 90 tablet 1     UNABLE TO FIND daily MEDICATION NAME: Tumeric       Vitamin D3 (CHOLECALCIFEROL) 125 MCG (5000 UT) tablet Take by mouth daily       ibuprofen (ADVIL/MOTRIN) 200 MG tablet Take 200 mg by mouth every 8 hours as needed for mild pain (Patient not taking: Reported on 2022)         PAST SURGICAL HISTORY:  Past Surgical History:   Procedure Laterality Date      CATARACT IOL, RT/LT Bilateral      COLONOSCOPY  2014    hemorrhoids;      COMBINED CYSTOSCOPY, INSERT STENT URETER(S)  12/10/2013    Procedure: COMBINED CYSTOSCOPY, INSERT STENT URETER(S);;  Surgeon: Milton Rangel MD;  Location: [facility]     EXTRACORPOREAL SHOCK WAVE LITHOTRIPSY (ESWL)  12/10/2013    Procedure: EXTRACORPOREAL SHOCK WAVE LITHOTRIPSY (ESWL);  RIGHT EXTRACORPOREAL SHOCK WAVE LITHOTRIPSY, CYSTOSCOPY WITH RIGHT STENT PLACEMENT, RIGHT RETROGRADE;  Surgeon: Milton Rangel MD;  Location: [facility]     EXTRACORPOREAL SHOCK WAVE LITHOTRIPSY (ESWL)  2014    Procedure: EXTRACORPOREAL SHOCK WAVE LITHOTRIPSY (ESWL);  Surgeon: Milton Rangel MD;  Location: [facility]     HC LAPAROSCOPY, SURGICAL; CHOLECYSTECTOMY      open procedure     HC REMOVAL OF TONSILS,12+ Y/O      age 21     HCL PAP SMEAR      normal     RELEASE CARPAL TUNNEL  2015     VASCULAR SURGERY      microvascular decompression of trigeminal nerve (MVD)     ZZC NONSPECIFIC PROCEDURE      microvascular decompression of the right trigeminal nerve     ZZC VAGINAL HYSTERECTOMY      menorhagia; both ovaries remain     ZZHC COLONOSCOPY THRU STOMA, DIAGNOSTIC  03    normal; diverticulosis       ALLERGIES  Meperidine hcl and Percodan [aspirin]    FAMILY HX:  Family History   Adopted: Yes   Problem Relation Age of Onset     Heart Disease Mother      Diabetes Maternal Grandfather      Heart Disease Father          age 26; enlarged heart     Diabetes Paternal Aunt      Heart Disease Brother         CABG     Heart Disease Sister        SOCIAL HX:  Social History     Socioeconomic History     Marital status:      Spouse name: None     Number of children: 2     Years of education: None     Highest education level: None   Occupational History     None   Tobacco Use     Smoking status: Never Smoker     Smokeless tobacco: Never Used   Substance and Sexual Activity     Alcohol use: Yes     Alcohol/week: 3.3 standard drinks      "Types: 4 Standard drinks or equivalent per week     Drug use: No     Sexual activity: Yes     Partners: Male     Birth control/protection: Post-menopausal   Other Topics Concern      Service Not Asked     Blood Transfusions Not Asked     Caffeine Concern Not Asked     Occupational Exposure Not Asked     Hobby Hazards Not Asked     Sleep Concern Not Asked     Stress Concern Not Asked     Weight Concern Not Asked     Special Diet No     Back Care Not Asked     Exercise Yes     Comment: walking everyday     Bike Helmet Not Asked     Seat Belt Not Asked     Self-Exams Not Asked     Parent/sibling w/ CABG, MI or angioplasty before 65F 55M? Not Asked   Social History Narrative     None     Social Determinants of Health     Financial Resource Strain: Not on file   Food Insecurity: Not on file   Transportation Needs: Not on file   Physical Activity: Not on file   Stress: Not on file   Social Connections: Not on file   Intimate Partner Violence: Not on file   Housing Stability: Not on file       ROS:  Constitutional: No fever, chills, or sweats. No weight gain/loss.   ENT: No visual disturbance, ear ache, epistaxis, sore throat.   Allergies/Immunologic: Negative.   Respiratory: No cough, hemoptysis.   Cardiovascular: As per HPI.   GI: No nausea, vomiting, hematemesis, melena, or hematochezia.   : No urinary frequency, dysuria, or hematuria.   Integument: Negative.   Psychiatric: Negative.   Neuro: Negative.   Endocrinology: Negative.   Musculoskeletal: No myalgia.    VITAL SIGNS:  /80   Pulse 66   Ht 1.575 m (5' 2\")   Wt 59 kg (130 lb)   BMI 23.78 kg/m    Body mass index is 23.78 kg/m .  Wt Readings from Last 2 Encounters:   01/06/22 59 kg (130 lb)   05/18/21 58.1 kg (128 lb)       PHYSICAL EXAM  Sapna Ibrahim IS A 76 year old female.in no acute distress.  HEENT: Unremarkable.  Neck: JVP normal.  Carotids +4/4 bilaterally without bruits.  Lungs: CTA.  Cor: RRR. Normal S1 and S2.  No murmur, rub, or " karolina. Abd: Soft, nontender, nondistended.      LABS    Lab Results   Component Value Date    WBC 15.0 05/14/2021     Lab Results   Component Value Date    RBC 5.20 05/14/2021     Lab Results   Component Value Date    HGB 15.6 05/18/2021     Lab Results   Component Value Date    HCT 47.7 05/14/2021     No components found for: MCT  Lab Results   Component Value Date    MCV 92 05/14/2021     Lab Results   Component Value Date    MCH 30.6 05/14/2021     Lab Results   Component Value Date    MCHC 33.3 05/14/2021     Lab Results   Component Value Date    RDW 13.2 05/14/2021     Lab Results   Component Value Date     05/14/2021      Recent Labs   Lab Test 05/14/21  0537 05/22/20  0937 04/03/19  1136     --  137   POTASSIUM 3.8  --  4.4   CHLORIDE 106  --  99   CO2 27  --  24   ANIONGAP 4  --   --    * 85 101*   BUN 20  --  17  NOT APPLICABLE   CR 0.82  --  0.79   FARRAH 8.7  --  9.9     Recent Labs   Lab Test 01/04/22  0835 05/22/20  0000 03/04/20  0925 12/06/16  0907 10/13/16  1713   CHOL 190 187 201*   < > 350*   HDL 56 64 62   < > 59   LDL 85 76 91   < > 246*   TRIG 245* 235* 238*   < > 225*   CHOLHDLRATIO  --  3  --   --  5.9*   NHDL 134*  --  139*   < >  --     < > = values in this interval not displayed.        IMPRESSION/PLAN:   1.  Hyperlipidemia, controlled on treatment.   2.  Hypertriglyceridemia.   3.  History of kidney stones.      DISCUSSION:  It was a pleasure to see Ms. Ibrahim in followup.  Clinically, she is doing well from a cardiovascular standpoint with no concerning symptoms.  She has remained active and her risk factors are controlled. She is so yeny and I am so glad that she is doing well.  We discussed when she is due for the booster, rather than getting JJ to get Moderna based on data right now.      Will plan to see her back in a year or sooner if there are any issues in the interim.  She understands and is in agreement with the plan as outlined.  All questions were answered.       Please do not hesitate to contact me with any questions or concerns.         RAYRAY RITCHIE MD   36 minutes face-face, documentation and review of records on day of visit      Thank you for allowing me to participate in the care of your patient.      Sincerely,     Rayray Ritchie MD     [facility]  cc:   No referring provider defined for this encounter.

## 2022-01-06 NOTE — PROGRESS NOTES
CARDIOLOGY CONSULTATION:    Ms. Ibrahim is a wonderful  76-year-old woman who looks and acts way younger than stated age, who I met in 2016 for hyperlipidemia with an LDL up to 250. She started on Crestor, currently at 20mg daily, and fortunately her cholesterol came down nicely.       She has no history of coronary artery disease but we do not know family history as she was adopted after her dad  of meningitis in his 20s.      Over the course of the past year, she has been doing well without any concerning cardiovascular symptoms.  She walks daily and is quite active; she calculated that she gets about 170 miles a month!       Her cholesterol from today looks reasonable with LDL of 85.  She lost her significant other this past Friday, which is sad.  He was suffering from cancer.  Her two children are doing well.  Her daughter, aCrina, who is a nurse here at the hospital, is stable with her MS.     PAST MEDICAL HISTORY:  Past Medical History:   Diagnosis Date     Allergic rhinitis due to pollen      Other and unspecified hyperlipidemia     Hyperlipidemia     Renal disease     kidney stone       CURRENT MEDICATIONS:  Current Outpatient Medications   Medication Sig Dispense Refill     cetirizine (ZYRTEC) 10 MG tablet TAKE ONE TABLET BY MOUTH EVERY EVENING 90 tablet 3     rosuvastatin (CRESTOR) 20 MG tablet Take 1 tablet (20 mg) by mouth daily 90 tablet 1     UNABLE TO FIND daily MEDICATION NAME: Tumeric       Vitamin D3 (CHOLECALCIFEROL) 125 MCG (5000 UT) tablet Take by mouth daily       ibuprofen (ADVIL/MOTRIN) 200 MG tablet Take 200 mg by mouth every 8 hours as needed for mild pain (Patient not taking: Reported on 2022)         PAST SURGICAL HISTORY:  Past Surgical History:   Procedure Laterality Date     CATARACT IOL, RT/LT Bilateral      COLONOSCOPY  2014    hemorrhoids;      COMBINED CYSTOSCOPY, INSERT STENT URETER(S)  12/10/2013    Procedure: COMBINED CYSTOSCOPY, INSERT STENT URETER(S);;  Surgeon:  Milton Rangel MD;  Location: [facility]     EXTRACORPOREAL SHOCK WAVE LITHOTRIPSY (ESWL)  12/10/2013    Procedure: EXTRACORPOREAL SHOCK WAVE LITHOTRIPSY (ESWL);  RIGHT EXTRACORPOREAL SHOCK WAVE LITHOTRIPSY, CYSTOSCOPY WITH RIGHT STENT PLACEMENT, RIGHT RETROGRADE;  Surgeon: Milton Rangel MD;  Location: [facility]     EXTRACORPOREAL SHOCK WAVE LITHOTRIPSY (ESWL)  2014    Procedure: EXTRACORPOREAL SHOCK WAVE LITHOTRIPSY (ESWL);  Surgeon: Milton Rangel MD;  Location: [facility]     HC LAPAROSCOPY, SURGICAL; CHOLECYSTECTOMY      open procedure     HC REMOVAL OF TONSILS,12+ Y/O      age 21     HCL PAP SMEAR      normal     RELEASE CARPAL TUNNEL  2015     VASCULAR SURGERY      microvascular decompression of trigeminal nerve (MVD)     ZC NONSPECIFIC PROCEDURE      microvascular decompression of the right trigeminal nerve     ZZC VAGINAL HYSTERECTOMY      menorhagia; both ovaries remain     ZZHC COLONOSCOPY THRU STOMA, DIAGNOSTIC  03    normal; diverticulosis       ALLERGIES  Meperidine hcl and Percodan [aspirin]    FAMILY HX:  Family History   Adopted: Yes   Problem Relation Age of Onset     Heart Disease Mother      Diabetes Maternal Grandfather      Heart Disease Father          age 26; enlarged heart     Diabetes Paternal Aunt      Heart Disease Brother         CABG     Heart Disease Sister        SOCIAL HX:  Social History     Socioeconomic History     Marital status:      Spouse name: None     Number of children: 2     Years of education: None     Highest education level: None   Occupational History     None   Tobacco Use     Smoking status: Never Smoker     Smokeless tobacco: Never Used   Substance and Sexual Activity     Alcohol use: Yes     Alcohol/week: 3.3 standard drinks     Types: 4 Standard drinks or equivalent per week     Drug use: No     Sexual activity: Yes     Partners: Male     Birth control/protection: Post-menopausal   Other Topics Concern      Service Not  "Asked     Blood Transfusions Not Asked     Caffeine Concern Not Asked     Occupational Exposure Not Asked     Hobby Hazards Not Asked     Sleep Concern Not Asked     Stress Concern Not Asked     Weight Concern Not Asked     Special Diet No     Back Care Not Asked     Exercise Yes     Comment: walking everyday     Bike Helmet Not Asked     Seat Belt Not Asked     Self-Exams Not Asked     Parent/sibling w/ CABG, MI or angioplasty before 65F 55M? Not Asked   Social History Narrative     None     Social Determinants of Health     Financial Resource Strain: Not on file   Food Insecurity: Not on file   Transportation Needs: Not on file   Physical Activity: Not on file   Stress: Not on file   Social Connections: Not on file   Intimate Partner Violence: Not on file   Housing Stability: Not on file       ROS:  Constitutional: No fever, chills, or sweats. No weight gain/loss.   ENT: No visual disturbance, ear ache, epistaxis, sore throat.   Allergies/Immunologic: Negative.   Respiratory: No cough, hemoptysis.   Cardiovascular: As per HPI.   GI: No nausea, vomiting, hematemesis, melena, or hematochezia.   : No urinary frequency, dysuria, or hematuria.   Integument: Negative.   Psychiatric: Negative.   Neuro: Negative.   Endocrinology: Negative.   Musculoskeletal: No myalgia.    VITAL SIGNS:  /80   Pulse 66   Ht 1.575 m (5' 2\")   Wt 59 kg (130 lb)   BMI 23.78 kg/m    Body mass index is 23.78 kg/m .  Wt Readings from Last 2 Encounters:   01/06/22 59 kg (130 lb)   05/18/21 58.1 kg (128 lb)       PHYSICAL EXAM  Sapna Ibrahim IS A 76 year old female.in no acute distress.  HEENT: Unremarkable.  Neck: JVP normal.  Carotids +4/4 bilaterally without bruits.  Lungs: CTA.  Cor: RRR. Normal S1 and S2.  No murmur, rub, or gallop. Abd: Soft, nontender, nondistended.      LABS    Lab Results   Component Value Date    WBC 15.0 05/14/2021     Lab Results   Component Value Date    RBC 5.20 05/14/2021     Lab Results   Component " Value Date    HGB 15.6 05/18/2021     Lab Results   Component Value Date    HCT 47.7 05/14/2021     No components found for: MCT  Lab Results   Component Value Date    MCV 92 05/14/2021     Lab Results   Component Value Date    MCH 30.6 05/14/2021     Lab Results   Component Value Date    MCHC 33.3 05/14/2021     Lab Results   Component Value Date    RDW 13.2 05/14/2021     Lab Results   Component Value Date     05/14/2021      Recent Labs   Lab Test 05/14/21  0537 05/22/20  0937 04/03/19  1136     --  137   POTASSIUM 3.8  --  4.4   CHLORIDE 106  --  99   CO2 27  --  24   ANIONGAP 4  --   --    * 85 101*   BUN 20  --  17  NOT APPLICABLE   CR 0.82  --  0.79   FARRAH 8.7  --  9.9     Recent Labs   Lab Test 01/04/22  0835 05/22/20  0000 03/04/20  0925 12/06/16  0907 10/13/16  1713   CHOL 190 187 201*   < > 350*   HDL 56 64 62   < > 59   LDL 85 76 91   < > 246*   TRIG 245* 235* 238*   < > 225*   CHOLHDLRATIO  --  3  --   --  5.9*   NHDL 134*  --  139*   < >  --     < > = values in this interval not displayed.        IMPRESSION/PLAN:   1.  Hyperlipidemia, controlled on treatment.   2.  Hypertriglyceridemia.   3.  History of kidney stones.      DISCUSSION:  It was a pleasure to see Ms. Ibrahim in followup.  Clinically, she is doing well from a cardiovascular standpoint with no concerning symptoms.  She has remained active and her risk factors are controlled. She is so yeny and I am so glad that she is doing well.  We discussed when she is due for the booster, rather than getting JJ to get Moderna based on data right now.      Will plan to see her back in a year or sooner if there are any issues in the interim.  She understands and is in agreement with the plan as outlined.  All questions were answered.      Please do not hesitate to contact me with any questions or concerns.         RAYRAY MONTEJO MD   36 minutes face-face, documentation and review of records on day of visit

## 2022-01-10 ENCOUNTER — HOSPITAL ENCOUNTER (OUTPATIENT)
Dept: MAMMOGRAPHY | Facility: CLINIC | Age: 77
Discharge: HOME | End: 2022-01-10
Attending: FAMILY MEDICINE | Admitting: FAMILY MEDICINE
Payer: COMMERCIAL

## 2022-01-10 DIAGNOSIS — Z12.31 VISIT FOR SCREENING MAMMOGRAM: ICD-10-CM

## 2022-01-10 PROCEDURE — 77067 SCR MAMMO BI INCL CAD: CPT

## 2022-03-22 ENCOUNTER — OFFICE VISIT (OUTPATIENT)
Dept: FAMILY MEDICINE | Facility: CLINIC | Age: 77
End: 2022-03-22

## 2022-03-22 VITALS
OXYGEN SATURATION: 98 % | TEMPERATURE: 97.3 F | HEIGHT: 62 IN | HEART RATE: 70 BPM | DIASTOLIC BLOOD PRESSURE: 76 MMHG | SYSTOLIC BLOOD PRESSURE: 122 MMHG | BODY MASS INDEX: 24.48 KG/M2 | WEIGHT: 133 LBS

## 2022-03-22 DIAGNOSIS — Z00.00 ENCOUNTER FOR GENERAL MEDICAL EXAMINATION: Primary | ICD-10-CM

## 2022-03-22 DIAGNOSIS — E78.2 MIXED HYPERLIPIDEMIA: ICD-10-CM

## 2022-03-22 PROCEDURE — 99397 PER PM REEVAL EST PAT 65+ YR: CPT | Performed by: PHYSICIAN ASSISTANT

## 2022-03-22 RX ORDER — UBIDECARENONE 100 MG
100 CAPSULE ORAL DAILY
COMMUNITY

## 2022-03-22 NOTE — NURSING NOTE
Chief Complaint   Patient presents with     Physical     non fasting         Pre-visit Screening:  Immunizations:  up to date  Colonoscopy:  NA  Mammogram: NA  Asthma Action Test/Plan:  NA  PHQ9:  NA  GAD7:  NA  Questioned patient about current smoking habits Pt. has never smoked.  Ok to leave detailed message on voice mail for today's visit only Yes, phone # [phone]

## 2022-03-22 NOTE — PROGRESS NOTES
Chief Complaint:  Physical Exam    SUBJECTIVE:   Sapna Ibrahim is a 76 year old female presents for routine health maintenance.    Current concerns: None    Menses are absent    No LMP recorded. Patient is postmenopausal.    Was last Pap smear normal: Yes  Due for mammogram:  No    Body mass index is 24.33 kg/m .    Present exercise habits:  3-5 times/week, walking, treadmill, weight  Present dietary habits:  eats regular meals and follows a balanced nutrition diet    Calcium intake:  1-2 servings daily  Vit D intake: is taking supplement    Is the patient a smoker? No  If yes, smoking cessation advised and counseling provided.     Cardiovascular risk factors: lipids    Over the past few weeks, have you felt down or depressed? Little interest or pleasure in doing things? No concerns    If in a relationship are there any Domestic violence concern: No    Last dental appointment:  2 years ago  Last optical appointment:  this year    Was the patient born between [birth year] and has not had Hep C testing?  Patient has already been tested    I have reviewed the following histories: Past Medical History, Past Surgical History, Social History, Family History, Problem List, Medication List and Allergies    Past Medical History:   Diagnosis Date     Allergic rhinitis due to pollen      Other and unspecified hyperlipidemia     Hyperlipidemia     Renal disease     kidney stone     Family History   Adopted: Yes   Problem Relation Age of Onset     Heart Disease Mother      Diabetes Maternal Grandfather      Heart Disease Father          age 26; enlarged heart     Diabetes Paternal Aunt      Heart Disease Brother         CABG     Heart Disease Sister      Breast Cancer No family hx of      Ovarian Cancer No family hx of      Social History     Socioeconomic History     Marital status:      Spouse name: Not on file     Number of children: 2     Years of education: Not on file     Highest education level: Not on file    Occupational History     Not on file   Tobacco Use     Smoking status: Never Smoker     Smokeless tobacco: Never Used   Substance and Sexual Activity     Alcohol use: Yes     Alcohol/week: 3.3 standard drinks     Types: 4 Standard drinks or equivalent per week     Drug use: No     Sexual activity: Yes     Partners: Male     Birth control/protection: Post-menopausal   Other Topics Concern      Service Not Asked     Blood Transfusions Not Asked     Caffeine Concern Not Asked     Occupational Exposure Not Asked     Hobby Hazards Not Asked     Sleep Concern Not Asked     Stress Concern Not Asked     Weight Concern Not Asked     Special Diet No     Back Care Not Asked     Exercise Yes     Comment: walking everyday     Bike Helmet Not Asked     Seat Belt Not Asked     Self-Exams Not Asked     Parent/sibling w/ CABG, MI or angioplasty before 65F 55M? Not Asked   Social History Narrative     Not on file     Social Determinants of Health     Financial Resource Strain: Not on file   Food Insecurity: Not on file   Transportation Needs: Not on file   Physical Activity: Not on file   Stress: Not on file   Social Connections: Not on file   Intimate Partner Violence: Not on file   Housing Stability: Not on file     Past Surgical History:   Procedure Laterality Date     CATARACT IOL, RT/LT Bilateral      COLONOSCOPY  9/2014    hemorrhoids;      COMBINED CYSTOSCOPY, INSERT STENT URETER(S)  12/10/2013    Procedure: COMBINED CYSTOSCOPY, INSERT STENT URETER(S);;  Surgeon: Milton Rangel MD;  Location:  OR     EXTRACORPOREAL SHOCK WAVE LITHOTRIPSY (ESWL)  12/10/2013    Procedure: EXTRACORPOREAL SHOCK WAVE LITHOTRIPSY (ESWL);  RIGHT EXTRACORPOREAL SHOCK WAVE LITHOTRIPSY, CYSTOSCOPY WITH RIGHT STENT PLACEMENT, RIGHT RETROGRADE;  Surgeon: Milton Rangel MD;  Location:  OR     EXTRACORPOREAL SHOCK WAVE LITHOTRIPSY (ESWL)  5/13/2014    Procedure: EXTRACORPOREAL SHOCK WAVE LITHOTRIPSY (ESWL);  Surgeon: Milton Rangel MD;   Location: [facility]     HC LAPAROSCOPY, SURGICAL; CHOLECYSTECTOMY  1996    open procedure     HC REMOVAL OF TONSILS,12+ Y/O      age 21     HCL PAP SMEAR  1999    normal     RELEASE CARPAL TUNNEL  12/2015     VASCULAR SURGERY  1994    microvascular decompression of trigeminal nerve (MVD)     Z NONSPECIFIC PROCEDURE  1994    microvascular decompression of the right trigeminal nerve     ZC VAGINAL HYSTERECTOMY  1983    menorhagia; both ovaries remain     ZZHC COLONOSCOPY THRU STOMA, DIAGNOSTIC  7/8/03    normal; diverticulosis       ROS:  E/M: NEGATIVE for ear, nose, mouth and throat problems  R: NEGATIVE for significant/chronic cough or SOB  CV: NEGATIVE for chest pain or palpitations  GI: NEGATIVE for abdominal pain, chronic diarrhea or constipation  :  NEGATIVE for dysuria, hematuria or vaginal discharge. No sexual health concerns.       Current Outpatient Medications   Medication     cetirizine (ZYRTEC) 10 MG tablet     co-enzyme Q-10 100 MG CAPS capsule     ibuprofen (ADVIL/MOTRIN) 200 MG tablet     rosuvastatin (CRESTOR) 20 MG tablet     UNABLE TO FIND     Vitamin D3 (CHOLECALCIFEROL) 125 MCG (5000 UT) tablet     No current facility-administered medications for this visit.       Patient Active Problem List    Diagnosis Date Noted     Senile nuclear sclerosis 07/30/2015     Priority: Medium     Dermatitis 05/08/2014     Priority: Medium     Calculus of kidney 12/05/2013     Priority: Medium     First episode 10/27/13       Mixed hyperlipidemia      Priority: Medium     Problem list name updated by automated process. Provider to review       Allergic state      Priority: Medium     Problem list name updated by automated process. Provider to review       Health Care Home 12/31/2012     Priority: Low     State Tier Level:  Tier 1  Status:  n/a  Care Coordinator:      See Letters for [facility] Care Plan           ACP (advance care planning) 05/25/2011     Priority: Low     Advance Care Planning:   ACP Review and Resources  "Provided:  Reviewed chart for advance care plan.  Sapna Ibrahim has no plan or code status on file. Discussed available resources and provided with information. Confirmed code status reflects current choices pending further ACP discussions.  Confirmed/documented designated decision maker(s). See permanent comments section of demographics in clinical tab.   Added by Dawna Espinosa on 5/8/2014                 OBJECTIVE:  /76 (BP Location: Right arm, Patient Position: Sitting, Cuff Size: Adult Regular)   Pulse 70   Temp 97.3  F (36.3  C) (Temporal)   Ht 1.575 m (5' 2\")   Wt 60.3 kg (133 lb)   SpO2 98%   BMI 24.33 kg/m        General: 76 year old female who appears her stated age. Vital signs noted.  Head: Normocephalic  Eyes: pupils equal round reactive to light and accomodation, extra ocular movements intact  Ears: external canals and TMs free of abnormalities  Nose: patent, without mucosal abnormalities  Mouth and throat: without erythema or lesions of the mucosa  Neck: supple, without adenopathy or thyromegaly  Lungs: clear to auscultation, no wheezing or crackles  Breasts: Declined. No concerns.  Cv: regular rate and rhythm, normal s1 and s2 without murmur or click  Abd: soft, non-tender, no masses, no hepatomegaly or splenomegaly.   (female): Declined. No concerns.  Ms: normal muscle tone & symmetry  Skin: clear to inspection and with no palpable abnormalities.  Neuro: sensation and motor function grossly intact; cranial nerves without obvious abnormalities.    ASSESSMENT/PLAN:    Encounter for general medical examination  Sapna is doing well today. No concerns. No indication for labs today, but could consider fasting for glucose check, but this was normal 5/2020.     Mixed hyperlipidemia  Continue working with cardiologist. No concerns.      reports that she has never smoked. She has never used smokeless tobacco.    Estimated body mass index is 24.33 kg/m  as calculated from the following:    " "Height as of this encounter: 1.575 m (5' 2\").    Weight as of this encounter: 60.3 kg (133 lb).    Meds Suggested:      Vitamin D       Calcium  Tests Recommended:      Regular Dental Examinations        Eye exam  Behavior Modifications:       Cardiovascular exercise 3 times per week--enough to get your Target Heart rate  Other recommendations:    Health Care directive     BMI noted and discussed      Regular breast exam     Encouraged My Chart    Counseling Resources:  ATP IV Guidelines  Pooled Cohorts Equation Calculator  Breast Cancer Risk Calculator  FRAX Risk Assessment  ICSI Preventive Guidelines  Dietary Guidelines for Americans, 2010  USDA's MyPlate      Verena Freedman PA-C  3/22/2022    "

## 2022-04-20 DIAGNOSIS — E78.2 MIXED HYPERLIPIDEMIA: ICD-10-CM

## 2022-04-20 RX ORDER — ROSUVASTATIN CALCIUM 20 MG/1
20 TABLET, COATED ORAL DAILY
Qty: 90 TABLET | Refills: 2 | Status: SHIPPED | OUTPATIENT
Start: 2022-04-20 | End: 2023-01-12

## 2022-10-09 ENCOUNTER — HEALTH MAINTENANCE LETTER (OUTPATIENT)
Age: 77
End: 2022-10-09

## 2022-12-08 DIAGNOSIS — E78.2 MIXED HYPERLIPIDEMIA: Primary | ICD-10-CM

## 2023-01-09 ENCOUNTER — LAB (OUTPATIENT)
Dept: LAB | Facility: CLINIC | Age: 78
End: 2023-01-09
Payer: COMMERCIAL

## 2023-01-09 DIAGNOSIS — E78.2 MIXED HYPERLIPIDEMIA: ICD-10-CM

## 2023-01-09 LAB
ALT SERPL W P-5'-P-CCNC: 16 U/L (ref 10–35)
CHOLEST SERPL-MCNC: 184 MG/DL
HDLC SERPL-MCNC: 57 MG/DL
LDLC SERPL CALC-MCNC: 93 MG/DL
NONHDLC SERPL-MCNC: 127 MG/DL
TRIGL SERPL-MCNC: 172 MG/DL

## 2023-01-09 PROCEDURE — 80061 LIPID PANEL: CPT

## 2023-01-09 PROCEDURE — 36415 COLL VENOUS BLD VENIPUNCTURE: CPT

## 2023-01-09 PROCEDURE — 84460 ALANINE AMINO (ALT) (SGPT): CPT

## 2023-01-11 ENCOUNTER — OFFICE VISIT (OUTPATIENT)
Dept: CARDIOLOGY | Facility: CLINIC | Age: 78
End: 2023-01-11
Payer: COMMERCIAL

## 2023-01-11 ENCOUNTER — TELEPHONE (OUTPATIENT)
Dept: CARDIOLOGY | Facility: CLINIC | Age: 78
End: 2023-01-11

## 2023-01-11 ENCOUNTER — HOSPITAL ENCOUNTER (OUTPATIENT)
Dept: MAMMOGRAPHY | Facility: CLINIC | Age: 78
Discharge: HOME | End: 2023-01-11
Attending: PHYSICIAN ASSISTANT | Admitting: PHYSICIAN ASSISTANT
Payer: COMMERCIAL

## 2023-01-11 VITALS
HEIGHT: 62 IN | DIASTOLIC BLOOD PRESSURE: 77 MMHG | OXYGEN SATURATION: 96 % | WEIGHT: 126 LBS | HEART RATE: 80 BPM | SYSTOLIC BLOOD PRESSURE: 118 MMHG | BODY MASS INDEX: 23.19 KG/M2

## 2023-01-11 DIAGNOSIS — Z12.31 ENCOUNTER FOR SCREENING MAMMOGRAM FOR MALIGNANT NEOPLASM OF BREAST: ICD-10-CM

## 2023-01-11 DIAGNOSIS — Z87.891 FORMER TOBACCO USE: ICD-10-CM

## 2023-01-11 DIAGNOSIS — Z71.89 CARDIAC RISK COUNSELING: ICD-10-CM

## 2023-01-11 DIAGNOSIS — E78.2 MIXED HYPERLIPIDEMIA: Primary | ICD-10-CM

## 2023-01-11 PROCEDURE — 93000 ELECTROCARDIOGRAM COMPLETE: CPT | Performed by: INTERNAL MEDICINE

## 2023-01-11 PROCEDURE — 99214 OFFICE O/P EST MOD 30 MIN: CPT | Performed by: INTERNAL MEDICINE

## 2023-01-11 PROCEDURE — 77067 SCR MAMMO BI INCL CAD: CPT

## 2023-01-11 RX ORDER — VIT A/VIT C/VIT E/ZINC/COPPER 2148-113
TABLET ORAL DAILY
COMMUNITY

## 2023-01-11 RX ORDER — CALCIUM CARBONATE 500(1250)
1 TABLET ORAL 2 TIMES DAILY
COMMUNITY
End: 2023-04-11

## 2023-01-11 RX ORDER — ACETAMINOPHEN 500 MG
500-1000 TABLET ORAL EVERY 6 HOURS PRN
COMMUNITY
End: 2023-04-11

## 2023-01-11 NOTE — PROGRESS NOTES
"  Clinic visit note dictated. Dictation reference number - [id number]        Today's clinic visit entailed:  Review of the result(s) of each unique test - ECG, labs  Ordering of each unique test  The level of medical decision making during this visit was of moderate complexity.        Encounter Diagnoses   Name Primary?     Mixed hyperlipidemia Yes     Cardiac risk counseling      Former tobacco use          Orders Placed This Encounter   Procedures     Follow-Up with Cardiology     EKG 12-lead complete w/read - Clinics (performed today)         Vitals: /77   Pulse 80   Ht 1.575 m (5' 2\")   Wt 57.2 kg (126 lb)   SpO2 96%   BMI 23.05 kg/m    Wt Readings from Last 5 Encounters:   01/11/23 57.2 kg (126 lb)   03/22/22 60.3 kg (133 lb)   01/06/22 59 kg (130 lb)   05/18/21 58.1 kg (128 lb)   05/22/20 59.7 kg (131 lb 9.6 oz)           CURRENT MEDICATIONS:  Current Outpatient Medications   Medication Sig Dispense Refill     acetaminophen (TYLENOL) 500 MG tablet Take 500-1,000 mg by mouth every 6 hours as needed for mild pain       calcium carbonate (OS-FARRAH) 500 MG tablet Take 1 tablet by mouth 2 times daily       cetirizine (ZYRTEC) 10 MG tablet TAKE ONE TABLET BY MOUTH EVERY EVENING 90 tablet 3     co-enzyme Q-10 100 MG CAPS capsule Take 100 mg by mouth daily       ibuprofen (ADVIL/MOTRIN) 200 MG tablet Take 200 mg by mouth every 8 hours as needed for mild pain       Multiple Vitamins-Minerals (PRESERVISION AREDS) TABS        rosuvastatin (CRESTOR) 20 MG tablet Take 1 tablet (20 mg) by mouth daily 90 tablet 2     UNABLE TO FIND daily MEDICATION NAME: Tumeric       Vitamin D3 (CHOLECALCIFEROL) 125 MCG (5000 UT) tablet Take by mouth daily           ALLERGIES:  Allergies   Allergen Reactions     Meperidine Hcl      sick to stomache     Percodan [Aspirin]        PAST MEDICAL HISTORY:    Past Medical History:   Diagnosis Date     Allergic rhinitis due to pollen      Mixed dyslipidemia        PAST SURGICAL " HISTORY:    Past Surgical History:   Procedure Laterality Date     CATARACT IOL, RT/LT Bilateral      COLONOSCOPY  2014    hemorrhoids;      COMBINED CYSTOSCOPY, INSERT STENT URETER(S)  12/10/2013    Procedure: COMBINED CYSTOSCOPY, INSERT STENT URETER(S);;  Surgeon: Milton Rangel MD;  Location[facility]     EXTRACORPOREAL SHOCK WAVE LITHOTRIPSY (ESWL)  12/10/2013    Procedure: EXTRACORPOREAL SHOCK WAVE LITHOTRIPSY (ESWL);  RIGHT EXTRACORPOREAL SHOCK WAVE LITHOTRIPSY, CYSTOSCOPY WITH RIGHT STENT PLACEMENT, RIGHT RETROGRADE;  Surgeon: Milton Rangel MD;  Location[facility]     EXTRACORPOREAL SHOCK WAVE LITHOTRIPSY (ESWL)  2014    Procedure: EXTRACORPOREAL SHOCK WAVE LITHOTRIPSY (ESWL);  Surgeon: Milton Rangel MD;  Location: [facility]     HC LAPAROSCOPY, SURGICAL; CHOLECYSTECTOMY      open procedure     HC REMOVAL OF TONSILS,12+ Y/O      age 21     HCL PAP SMEAR      normal     RELEASE CARPAL TUNNEL  2015     VASCULAR SURGERY      microvascular decompression of trigeminal nerve (MVD)     Z NONSPECIFIC PROCEDURE      microvascular decompression of the right trigeminal nerve     ZZ VAGINAL HYSTERECTOMY      menorhagia; both ovaries remain     ZZHC COLONOSCOPY THRU STOMA, DIAGNOSTIC  03    normal; diverticulosis       FAMILY HISTORY:    Family History   Adopted: Yes   Problem Relation Age of Onset     Breast Cancer No family hx of      Ovarian Cancer No family hx of        SOCIAL HISTORY:    Social History     Socioeconomic History     Marital status:      Spouse name: None     Number of children: 2     Years of education: None     Highest education level: None   Tobacco Use     Smoking status: Former     Packs/day: 0.50     Years: 20.00     Pack years: 10.00     Types: Cigarettes     Quit date:      Years since quittin.0     Smokeless tobacco: Never   Substance and Sexual Activity     Alcohol use: Not Currently     Drug use: Never     Sexual activity: Yes     Partners:  Male     Birth control/protection: Post-menopausal   Other Topics Concern     Special Diet No     Exercise Yes     Comment: walking everyday

## 2023-01-11 NOTE — LETTER
"1/11/2023    Verena Freedman PA-C  [address], [address]  [facility] [address]    RE: Sapna Ibrahim       Dear Colleague,     I had the pleasure of seeing Sapna Ibrahim in the [facility].    Clinic visit note dictated. Dictation reference number - [id number]        Today's clinic visit entailed:  Review of the result(s) of each unique test - ECG, labs  Ordering of each unique test  The level of medical decision making during this visit was of moderate complexity.        Encounter Diagnoses   Name Primary?     Mixed hyperlipidemia Yes     Cardiac risk counseling      Former tobacco use          Orders Placed This Encounter   Procedures     Follow-Up with Cardiology     EKG 12-lead complete w/read - Clinics (performed today)         Vitals: /77   Pulse 80   Ht 1.575 m (5' 2\")   Wt 57.2 kg (126 lb)   SpO2 96%   BMI 23.05 kg/m    Wt Readings from Last 5 Encounters:   01/11/23 57.2 kg (126 lb)   03/22/22 60.3 kg (133 lb)   01/06/22 59 kg (130 lb)   05/18/21 58.1 kg (128 lb)   05/22/20 59.7 kg (131 lb 9.6 oz)           CURRENT MEDICATIONS:  Current Outpatient Medications   Medication Sig Dispense Refill     acetaminophen (TYLENOL) 500 MG tablet Take 500-1,000 mg by mouth every 6 hours as needed for mild pain       calcium carbonate (OS-FARRAH) 500 MG tablet Take 1 tablet by mouth 2 times daily       cetirizine (ZYRTEC) 10 MG tablet TAKE ONE TABLET BY MOUTH EVERY EVENING 90 tablet 3     co-enzyme Q-10 100 MG CAPS capsule Take 100 mg by mouth daily       ibuprofen (ADVIL/MOTRIN) 200 MG tablet Take 200 mg by mouth every 8 hours as needed for mild pain       Multiple Vitamins-Minerals (PRESERVISION AREDS) TABS        rosuvastatin (CRESTOR) 20 MG tablet Take 1 tablet (20 mg) by mouth daily 90 tablet 2     UNABLE TO FIND daily MEDICATION NAME: Tumeric       Vitamin D3 (CHOLECALCIFEROL) 125 MCG (5000 UT) tablet Take by mouth daily           ALLERGIES:  Allergies   Allergen Reactions     " Meperidine Hcl      sick to stomache     Percodan [Aspirin]        PAST MEDICAL HISTORY:    Past Medical History:   Diagnosis Date     Allergic rhinitis due to pollen      Mixed dyslipidemia        PAST SURGICAL HISTORY:    Past Surgical History:   Procedure Laterality Date     CATARACT IOL, RT/LT Bilateral      COLONOSCOPY  9/2014    hemorrhoids;      COMBINED CYSTOSCOPY, INSERT STENT URETER(S)  12/10/2013    Procedure: COMBINED CYSTOSCOPY, INSERT STENT URETER(S);;  Surgeon: Milton Rangel MD;  [facility]     EXTRACORPOREAL SHOCK WAVE LITHOTRIPSY (ESWL)  12/10/2013    Procedure: EXTRACORPOREAL SHOCK WAVE LITHOTRIPSY (ESWL);  RIGHT EXTRACORPOREAL SHOCK WAVE LITHOTRIPSY, CYSTOSCOPY WITH RIGHT STENT PLACEMENT, RIGHT RETROGRADE;  Surgeon: Milton Rangel MD;  Location:  OR     EXTRACORPOREAL SHOCK WAVE LITHOTRIPSY (ESWL)  5/13/2014    Procedure: EXTRACORPOREAL SHOCK WAVE LITHOTRIPSY (ESWL);  Surgeon: Milton Rangel MD;  Location: [facility]     HC LAPAROSCOPY, SURGICAL; CHOLECYSTECTOMY  1996    open procedure     HC REMOVAL OF TONSILS,12+ Y/O      age 21     HCL PAP SMEAR  1999    normal     RELEASE CARPAL TUNNEL  12/2015     VASCULAR SURGERY  1994    microvascular decompression of trigeminal nerve (MVD)     [facility] NONSPECIFIC PROCEDURE  1994    microvascular decompression of the right trigeminal nerve     Z VAGINAL HYSTERECTOMY  1983    menorhagia; both ovaries remain     ZZHC COLONOSCOPY THRU STOMA, DIAGNOSTIC  7/8/03    normal; diverticulosis       FAMILY HISTORY:    Family History   Adopted: Yes   Problem Relation Age of Onset     Breast Cancer No family hx of      Ovarian Cancer No family hx of        SOCIAL HISTORY:    Social History     Socioeconomic History     Marital status:      Spouse name: None     Number of children: 2     Years of education: None     Highest education level: None   Tobacco Use     Smoking status: Former     Packs/day: 0.50     Years: 20.00     Pack years: 10.00     Types:  Cigarettes     Quit date:      Years since quittin.0     Smokeless tobacco: Never   Substance and Sexual Activity     Alcohol use: Not Currently     Drug use: Never     Sexual activity: Yes     Partners: Male     Birth control/protection: Post-menopausal   Other Topics Concern     Special Diet No     Exercise Yes     Comment: walking everyday                         Service Date: 2023    FORMER CARDIOLOGIST:  Dr. Shelbi Ritchie    PRIMARY CARE PROVIDER:  Verena Freedman PA-C    REASON FOR VISIT:  1.  Mixed dyslipidemia.  2.  Cardiac risk stratification.    HISTORY OF PRESENT ILLNESS:  Laurence Ibrahim is a 77-year-old  lady who was last seen by my partner, Dr. Ritchie, in 2022.  She is transferring care to my practice due to unavailability of appointments with Dr. Ritchie.  Sapna is a younger-appearing and physically active 77-year-old  lady, lives with her significant other.  History is significant for mixed dyslipidemia with very high LDL, remote tobacco user, BMI 23 kg/m2.    The following were addressed today:  1.  Mixed dyslipidemia with very high LDL.  2.  Cardiac risk stratification.    When Sapna saw Dr. Ritchie 6 years ago, her cholesterol panel was very abnormal with an LDL up to 250.  She was started on rosuvastatin 20 mg daily and her LDL came down to the 80-90s.  She also has hypertriglyceridemia up to 240 and on the rosuvastatin has come down to 170.  Laurence goes to the gym twice a week (strength training and aerobics) and does not have symptoms of chest pain or dyspnea.  Blood pressure is well controlled at 118/77.  She was a remote tobacco user who quit 30 years ago.  She is not aware of her family history as she was adopted.  She is curious to know what her risk of future coronary disease is because of her very high LDLs which was untreated for decades.    Her labs show a total cholesterol of 184, HDL 57, LDL 93, triglycerides 172, normal renal panel with a creatinine of  0.82, normal CBC with a hemoglobin of 15.6.    ECG shows normal sinus rhythm, 80 BPM with QTc of 411 msec.    I personally reviewed her labs and ECG as documented above.    PHYSICAL EXAMINATION:    VITAL SIGNS:  As documented.  CARDIOVASCULAR:  Regular heart sounds.  No murmur.  Normal JVP.  No vascular bruit.  RESPIRATORY:  Normal breath sounds.    DIAGNOSES:    1.  Hypercholesterolemia with very high LDL in the 250s.  With 20 mg of rosuvastatin, her LDL is in the 90s.    2.  Hypertriglyceridemia.  Counseled her about cutting back refined carbohydrates.  3.  Cardiac risk counseling.  The patient has had decades of very high LDL in the 250s before statin therapy, used to smoke cigarettes many years ago and does not know her family history because she is adopted.  I recommend a CT calcium scan for additional risk stratification, which will help me establish an ideal LDL target goal.    PLAN:    1.  Medications:  -- Continue rosuvastatin 20 for now.  We will adjust dose depending on CT calcium results.    2.  Orders placed:  -- CT coronary calcium scan.  -- Follow up with me to review results.    3.  Patient education and counseling:  -- Reviewed why patients have hypertriglyceridemia.  Typically, it is due to consuming excess carbohydrates and saturated fats.  She admits she likes to eat pasta, bread and rice and we will cut that back.    Moderate-complexity medical decision-making.    Melvin Bianchi MD        D: 2023   T: 2023   MT: kathy    Name:     ASHER SKELTON  MRN:      [id number]        Account:      [id number]   :      1945           Service Date: 2023       Document: [id number]      Thank you for allowing me to participate in the care of your patient.      Sincerely,     Melvin Bianchi MD     [facility]  cc:   No referring provider defined for this encounter.

## 2023-01-11 NOTE — TELEPHONE ENCOUNTER
[facility] Call Center    Phone Message    May a detailed message be left on voicemail: yes     Reason for Call: Other: pt would like to see if there is an ealrier appt for the CT Calcium Scan - she was thinking around 9 or 10 am on the same day.  Please call to advise and reschedule if able.  thank you     Action Taken: Message routed to:  [facility] ([facility] cardio    Travel Screening: Not Applicable     Thank you!  [facility]

## 2023-01-11 NOTE — PROGRESS NOTES
Service Date: 01/11/2023    FORMER CARDIOLOGIST:  Dr. Shelbi Ritchie    PRIMARY CARE PROVIDER:  Verena Freedman PA-C    REASON FOR VISIT:  1.  Mixed dyslipidemia.  2.  Cardiac risk stratification.    HISTORY OF PRESENT ILLNESS:  Laurence Ibrahim is a 77-year-old  lady who was last seen by my partner, Dr. Ritchie, in 01/2022.  She is transferring care to my practice due to unavailability of appointments with Dr. Ritchie.  Sapna is a younger-appearing and physically active 77-year-old  lady, lives with her significant other.  History is significant for mixed dyslipidemia with very high LDL, remote tobacco user, BMI 23 kg/m2.    The following were addressed today:  1.  Mixed dyslipidemia with very high LDL.  2.  Cardiac risk stratification.    When Sapna saw Dr. Ritchie 6 years ago, her cholesterol panel was very abnormal with an LDL up to 250.  She was started on rosuvastatin 20 mg daily and her LDL came down to the 80-90s.  She also has hypertriglyceridemia up to 240 and on the rosuvastatin has come down to 170.  Laurence goes to the gym twice a week (strength training and aerobics) and does not have symptoms of chest pain or dyspnea.  Blood pressure is well controlled at 118/77.  She was a remote tobacco user who quit 30 years ago.  She is not aware of her family history as she was adopted.  She is curious to know what her risk of future coronary disease is because of her very high LDLs which was untreated for decades.    Her labs show a total cholesterol of 184, HDL 57, LDL 93, triglycerides 172, normal renal panel with a creatinine of 0.82, normal CBC with a hemoglobin of 15.6.    ECG shows normal sinus rhythm, 80 BPM with QTc of 411 msec.    I personally reviewed her labs and ECG as documented above.    PHYSICAL EXAMINATION:    VITAL SIGNS:  As documented.  CARDIOVASCULAR:  Regular heart sounds.  No murmur.  Normal JVP.  No vascular bruit.  RESPIRATORY:  Normal breath sounds.    DIAGNOSES:    1.   Hypercholesterolemia with very high LDL in the 250s.  With 20 mg of rosuvastatin, her LDL is in the 90s.    2.  Hypertriglyceridemia.  Counseled her about cutting back refined carbohydrates.  3.  Cardiac risk counseling.  The patient has had decades of very high LDL in the 250s before statin therapy, used to smoke cigarettes many years ago and does not know her family history because she is adopted.  I recommend a CT calcium scan for additional risk stratification, which will help me establish an ideal LDL target goal.    PLAN:    1.  Medications:  -- Continue rosuvastatin 20 for now.  We will adjust dose depending on CT calcium results.    2.  Orders placed:  -- CT coronary calcium scan.  -- Follow up with me to review results.    3.  Patient education and counseling:  -- Reviewed why patients have hypertriglyceridemia.  Typically, it is due to consuming excess carbohydrates and saturated fats.  She admits she likes to eat pasta, bread and rice and we will cut that back.    Moderate-complexity medical decision-making.    [employer] Keyana Bianchi MD        D: 2023   T: 2023   MT: kathy    Name:     NAFISA ASHER D.  MRN:      [id number]        Account:      [id number]   :      1945           Service Date: 2023       Document: [id number]

## 2023-01-11 NOTE — PATIENT INSTRUCTIONS
CT coronary calcium scan.  2. Follow up with Dr. Bianchi (in person or video).    If you have any questions or concerns, please contact my nurses at [phone].

## 2023-01-12 ENCOUNTER — TELEPHONE (OUTPATIENT)
Dept: CARDIOLOGY | Facility: CLINIC | Age: 78
End: 2023-01-12

## 2023-01-12 DIAGNOSIS — E78.2 MIXED HYPERLIPIDEMIA: ICD-10-CM

## 2023-01-12 RX ORDER — ROSUVASTATIN CALCIUM 20 MG/1
20 TABLET, COATED ORAL DAILY
Qty: 90 TABLET | Refills: 0 | Status: SHIPPED | OUTPATIENT
Start: 2023-01-12 | End: 2023-02-08

## 2023-01-12 NOTE — TELEPHONE ENCOUNTER
[facility] Call Center    Phone Message    May a detailed message be left on voicemail: yes     Reason for Call: Medication Refill Request    Has the patient contacted the pharmacy for the refill? Yes   Name of medication being requested: rosuvastatin (CRESTOR) 20 MG tablet  Provider who prescribed the medication: Dr. Bianchi  Pharmacy:      [id number] - [place], [place] - [address]   Date medication is needed: 01/13/23   Pharmacy has not heard back on this medication      Action Taken: Other: cardiology    Travel Screening: Not Applicable   Thank you!  [facility]

## 2023-02-06 ENCOUNTER — HOSPITAL ENCOUNTER (OUTPATIENT)
Dept: CARDIOLOGY | Facility: CLINIC | Age: 78
Discharge: HOME | End: 2023-02-06
Attending: INTERNAL MEDICINE | Admitting: INTERNAL MEDICINE

## 2023-02-06 DIAGNOSIS — Z71.89 CARDIAC RISK COUNSELING: ICD-10-CM

## 2023-02-06 DIAGNOSIS — E78.2 MIXED HYPERLIPIDEMIA: ICD-10-CM

## 2023-02-06 PROCEDURE — 75571 CT HRT W/O DYE W/CA TEST: CPT | Mod: 26 | Performed by: INTERNAL MEDICINE

## 2023-02-06 PROCEDURE — 75571 CT HRT W/O DYE W/CA TEST: CPT

## 2023-02-07 ENCOUNTER — TELEPHONE (OUTPATIENT)
Dept: CARDIOLOGY | Facility: CLINIC | Age: 78
End: 2023-02-07
Payer: COMMERCIAL

## 2023-02-07 NOTE — TELEPHONE ENCOUNTER
CT Ca score 2-6-23  CORONARY ARTERY CALCIUM SCORES:      Left main coronary artery: 0  Left anterior descending coronary artery: 358  Circumflex coronary artery: 928  Right coronary artery: 278     TOTAL CALCIUM SCORE: 1564     Impressions  Severe coronary artery calcification noted  The total Agatston calcium score is 1564 placing the patient in the  96th percentile when compared to age and gender matched control group.  Ascending and descending thoracic aorta calcification noted  Mild mitral annular calcification    SOFT TISSUE RESULTS - IMPRESSION: 3 mm subpleural nodule right middle lobe on series 5 image 24 is very likely benign.    Next Dr. Bianchi 2-8-23    Last Dr. Bianchi's OV 1-11-23 -  Cardiac risk stratification    PCP Verena Freedman. PA.

## 2023-02-08 ENCOUNTER — OFFICE VISIT (OUTPATIENT)
Dept: CARDIOLOGY | Facility: CLINIC | Age: 78
End: 2023-02-08
Attending: INTERNAL MEDICINE
Payer: COMMERCIAL

## 2023-02-08 VITALS
SYSTOLIC BLOOD PRESSURE: 131 MMHG | WEIGHT: 127 LBS | HEIGHT: 62 IN | HEART RATE: 88 BPM | BODY MASS INDEX: 23.37 KG/M2 | OXYGEN SATURATION: 97 % | DIASTOLIC BLOOD PRESSURE: 77 MMHG

## 2023-02-08 DIAGNOSIS — R93.1 ELEVATED CORONARY ARTERY CALCIUM SCORE: ICD-10-CM

## 2023-02-08 DIAGNOSIS — E78.2 MIXED HYPERLIPIDEMIA: Primary | ICD-10-CM

## 2023-02-08 PROCEDURE — 99214 OFFICE O/P EST MOD 30 MIN: CPT | Performed by: INTERNAL MEDICINE

## 2023-02-08 RX ORDER — ROSUVASTATIN CALCIUM 40 MG/1
40 TABLET, COATED ORAL DAILY
Qty: 100 TABLET | Refills: 5 | Status: SHIPPED | OUTPATIENT
Start: 2023-02-08 | End: 2023-08-31

## 2023-02-08 NOTE — PROGRESS NOTES
"  Clinic visit note dictated. Dictation reference number - [id number]      PHYSICAL EXAMINATION:  Vitals: /77   Pulse 88   Ht 1.575 m (5' 2\")   Wt 57.6 kg (127 lb)   SpO2 97%   BMI 23.23 kg/m    Wt Readings from Last 5 Encounters:   02/08/23 57.6 kg (127 lb)   01/11/23 57.2 kg (126 lb)   03/22/22 60.3 kg (133 lb)   01/06/22 59 kg (130 lb)   05/18/21 58.1 kg (128 lb)           Encounter Diagnoses   Name Primary?     Mixed hyperlipidemia Yes     Elevated coronary artery calcium score          Orders Placed This Encounter   Procedures     Comprehensive metabolic panel     Lipid Profile     Follow-Up with Cardiology           CURRENT MEDICATIONS:  Current Outpatient Medications   Medication Sig Dispense Refill     acetaminophen (TYLENOL) 500 MG tablet Take 500-1,000 mg by mouth every 6 hours as needed for mild pain       calcium carbonate (OS-FARRAH) 500 MG tablet Take 1 tablet by mouth 2 times daily       cetirizine (ZYRTEC) 10 MG tablet TAKE ONE TABLET BY MOUTH EVERY EVENING 90 tablet 3     co-enzyme Q-10 100 MG CAPS capsule Take 100 mg by mouth daily       Multiple Vitamins-Minerals (PRESERVISION AREDS) TABS        rosuvastatin (CRESTOR) 40 MG tablet Take 1 tablet (40 mg) by mouth daily 100 tablet 5     UNABLE TO FIND daily MEDICATION NAME: Tumeric       Vitamin D3 (CHOLECALCIFEROL) 125 MCG (5000 UT) tablet Take by mouth daily           ALLERGIES:  Allergies   Allergen Reactions     Meperidine Hcl      sick to stomache     Percodan [Aspirin]        PAST MEDICAL HISTORY:    Past Medical History:   Diagnosis Date     Allergic rhinitis due to pollen      Mixed dyslipidemia        PAST SURGICAL HISTORY:    Past Surgical History:   Procedure Laterality Date     CATARACT IOL, RT/LT Bilateral      COLONOSCOPY  9/2014    hemorrhoids;      COMBINED CYSTOSCOPY, INSERT STENT URETER(S)  12/10/2013    Procedure: COMBINED CYSTOSCOPY, INSERT STENT URETER(S);;  Surgeon: Milton Rangel MD;  Location:  OR     [facility] " SHOCK WAVE LITHOTRIPSY (ESWL)  12/10/2013    Procedure: EXTRACORPOREAL SHOCK WAVE LITHOTRIPSY (ESWL);  RIGHT EXTRACORPOREAL SHOCK WAVE LITHOTRIPSY, CYSTOSCOPY WITH RIGHT STENT PLACEMENT, RIGHT RETROGRADE;  Surgeon: Milton Rangel MD;  Location: [facility] OR     EXTRACORPOREAL SHOCK WAVE LITHOTRIPSY (ESWL)  2014    Procedure: EXTRACORPOREAL SHOCK WAVE LITHOTRIPSY (ESWL);  Surgeon: Milton Rangel MD;  Location: [facility] OR     HC LAPAROSCOPY, SURGICAL; CHOLECYSTECTOMY      open procedure     HC REMOVAL OF TONSILS,12+ Y/O      age 21     HCL PAP SMEAR      normal     RELEASE CARPAL TUNNEL  2015     VASCULAR SURGERY      microvascular decompression of trigeminal nerve (MVD)     ZZC NONSPECIFIC PROCEDURE      microvascular decompression of the right trigeminal nerve     ZZC VAGINAL HYSTERECTOMY      menorhagia; both ovaries remain     ZZHC COLONOSCOPY THRU STOMA, DIAGNOSTIC  03    normal; diverticulosis       FAMILY HISTORY:    Family History   Adopted: Yes   Problem Relation Age of Onset     Breast Cancer No family hx of      Ovarian Cancer No family hx of        SOCIAL HISTORY:    Social History     Socioeconomic History     Marital status:      Spouse name: None     Number of children: 2     Years of education: None     Highest education level: None   Tobacco Use     Smoking status: Former     Packs/day: 0.50     Years: 20.00     Pack years: 10.00     Types: Cigarettes     Quit date:      Years since quittin.1     Smokeless tobacco: Never   Substance and Sexual Activity     Alcohol use: Not Currently     Drug use: Never     Sexual activity: Yes     Partners: Male     Birth control/protection: Post-menopausal   Other Topics Concern     Special Diet No     Exercise Yes     Comment: walking everyday

## 2023-02-08 NOTE — PROGRESS NOTES
Service Date: 02/08/2023    REASON FOR VISIT:  Cardiac risk stratification with CT coronary calcium.    PRIMARY CARE PROVIDER:  ALEXANDRA Temple    HISTORY OF PRESENT ILLNESS:    Sapna Ibrahim is a delightful, 77-year-old  lady, previously seen by Dr. Ritchie.  She is a younger-appearing, physically active lady, lives with her significant other.    Her history is significant for mixed dyslipidemia with a very high LDL. (LDL is in the 90s on 20 mg of rosuvastatin and a very active lifestyle.)  She is not aware of her family history as she is adopted.  Because her LDL has been very high for decades, we proceeded with a CT coronary calcium score.    I personally reviewed and independently interpreted her calcium score, which is 1564, placing her in the 96th percentile of risk.  She has mild ascending and descending thoracic aorta calcification and a less than 6 mm lung nodule (nonsmoker, will see her primary provider for this).    She has been taking 20 mg of rosuvastatin for years.  Her total cholesterol is 184, HDL 57, LDL 93, triglycerides 172 (recently 245), normal liver enzymes, normal renal panel, normal CBC and TSH.  Not diabetic.  Normotensive at 130/77.  She is an avid exerciser.  In summer, she walks from 4-10 miles a day and in winter does exercise classes at the gym.  No symptoms of chest pain, shortness of breath, lower extremity edema or palpitations.  She admits to liking pasta, bread, rice, sweets.  Her weight is stable at 127 pounds with a BMI of 23 kg/m2.  Social alcohol intake, never tobacco user.    DIAGNOSES:  1.  Elevated coronary calcium score at the 96th percentile of risk.  The patient is very physically active and has no cardiovascular symptoms.  Therefore, stress testing not indicated.  2.  Mixed dyslipidemia with high LDL.  LDL goal is 70 or below.  We will increase rosuvastatin.  Talked about dietary modification.      PLAN:    1.  Medications:  - Increase rosuvastatin from 20  to 40 mg daily.  New prescription done.  Discussed Potential side effect of myalgia.  She will contact my office if she develops any issues.    2.  Lifestyle modification, detailed education and counseling and review of the implications of elevated coronary calcium score using the heart model completed.     3.  In 6 months, follow up with me with fasting lipids and liver enzymes at the [place] office (patient request this as she lives in [place]).    Established patient.  Total time today 30 minutes.    cc:  ALEXANDRA Temple  [facility]  [address] [place], [place] [address]    [facility] Keyana Bianchi MD        D: 2023   T: 2023   MT: michael    Name:     ASHER SKELTON  MRN:      [id number]        Account:      [id number]   :      1945           Service Date: 2023       Document: [id number]

## 2023-02-08 NOTE — LETTER
"2/8/2023    Verena Freedman PA-C  [address]  [facility] [address]    RE: Sapna Ibrahim       Dear Colleague,     I had the pleasure of seeing Sapna Ibrahim in the [facility].    Clinic visit note dictated. Dictation reference number - [id number]      PHYSICAL EXAMINATION:  Vitals: /77   Pulse 88   Ht 1.575 m (5' 2\")   Wt 57.6 kg (127 lb)   SpO2 97%   BMI 23.23 kg/m    Wt Readings from Last 5 Encounters:   02/08/23 57.6 kg (127 lb)   01/11/23 57.2 kg (126 lb)   03/22/22 60.3 kg (133 lb)   01/06/22 59 kg (130 lb)   05/18/21 58.1 kg (128 lb)           Encounter Diagnoses   Name Primary?     Mixed hyperlipidemia Yes     Elevated coronary artery calcium score          Orders Placed This Encounter   Procedures     Comprehensive metabolic panel     Lipid Profile     Follow-Up with Cardiology           CURRENT MEDICATIONS:  Current Outpatient Medications   Medication Sig Dispense Refill     acetaminophen (TYLENOL) 500 MG tablet Take 500-1,000 mg by mouth every 6 hours as needed for mild pain       calcium carbonate (OS-FARRAH) 500 MG tablet Take 1 tablet by mouth 2 times daily       cetirizine (ZYRTEC) 10 MG tablet TAKE ONE TABLET BY MOUTH EVERY EVENING 90 tablet 3     co-enzyme Q-10 100 MG CAPS capsule Take 100 mg by mouth daily       Multiple Vitamins-Minerals (PRESERVISION AREDS) TABS        rosuvastatin (CRESTOR) 40 MG tablet Take 1 tablet (40 mg) by mouth daily 100 tablet 5     UNABLE TO FIND daily MEDICATION NAME: Tumeric       Vitamin D3 (CHOLECALCIFEROL) 125 MCG (5000 UT) tablet Take by mouth daily           ALLERGIES:  Allergies   Allergen Reactions     Meperidine Hcl      sick to stomache     Percodan [Aspirin]        PAST MEDICAL HISTORY:    Past Medical History:   Diagnosis Date     Allergic rhinitis due to pollen      Mixed dyslipidemia        PAST SURGICAL HISTORY:    Past Surgical History:   Procedure Laterality Date     CATARACT IOL, RT/LT Bilateral      " COLONOSCOPY  2014    hemorrhoids;      COMBINED CYSTOSCOPY, INSERT STENT URETER(S)  12/10/2013    Procedure: COMBINED CYSTOSCOPY, INSERT STENT URETER(S);;  Surgeon: Milton Rangel MD;  [facility]     EXTRACORPOREAL SHOCK WAVE LITHOTRIPSY (ESWL)  12/10/2013    Procedure: EXTRACORPOREAL SHOCK WAVE LITHOTRIPSY (ESWL);  RIGHT EXTRACORPOREAL SHOCK WAVE LITHOTRIPSY, CYSTOSCOPY WITH RIGHT STENT PLACEMENT, RIGHT RETROGRADE;  Surgeon: Milton Rangel MD;  Location[facility]     EXTRACORPOREAL SHOCK WAVE LITHOTRIPSY (ESWL)  2014    Procedure: EXTRACORPOREAL SHOCK WAVE LITHOTRIPSY (ESWL);  Surgeon: Milton Rangel MD;  Location: [facility] OR     HC LAPAROSCOPY, SURGICAL; CHOLECYSTECTOMY      open procedure     HC REMOVAL OF TONSILS,12+ Y/O      age 21     HCL PAP SMEAR      normal     RELEASE CARPAL TUNNEL  2015     VASCULAR SURGERY      microvascular decompression of trigeminal nerve (MVD)     ZZC NONSPECIFIC PROCEDURE      microvascular decompression of the right trigeminal nerve     ZZC VAGINAL HYSTERECTOMY      menorhagia; both ovaries remain     ZZHC COLONOSCOPY THRU STOMA, DIAGNOSTIC  03    normal; diverticulosis       FAMILY HISTORY:    Family History   Adopted: Yes   Problem Relation Age of Onset     Breast Cancer No family hx of      Ovarian Cancer No family hx of        SOCIAL HISTORY:    Social History     Socioeconomic History     Marital status:      Spouse name: None     Number of children: 2     Years of education: None     Highest education level: None   Tobacco Use     Smoking status: Former     Packs/day: 0.50     Years: 20.00     Pack years: 10.00     Types: Cigarettes     Quit date:      Years since quittin.1     Smokeless tobacco: Never   Substance and Sexual Activity     Alcohol use: Not Currently     Drug use: Never     Sexual activity: Yes     Partners: Male     Birth control/protection: Post-menopausal   Other Topics Concern     Special Diet No      Exercise Yes     Comment: walking everyday                         Service Date: 02/08/2023    REASON FOR VISIT:  Cardiac risk stratification with CT coronary calcium.    PRIMARY CARE PROVIDER:  ALEXANDRA Temple    HISTORY OF PRESENT ILLNESS:    Sapna Ibrahim is a delightful, 77-year-old  lady, previously seen by Dr. Ritchie.  She is a younger-appearing, physically active lady, lives with her significant other.    Her history is significant for mixed dyslipidemia with a very high LDL. (LDL is in the 90s on 20 mg of rosuvastatin and a very active lifestyle.)  She is not aware of her family history as she is adopted.  Because her LDL has been very high for decades, we proceeded with a CT coronary calcium score.    I personally reviewed and independently interpreted her calcium score, which is 1564, placing her in the 96th percentile of risk.  She has mild ascending and descending thoracic aorta calcification and a less than 6 mm lung nodule (nonsmoker, will see her primary provider for this).    She has been taking 20 mg of rosuvastatin for years.  Her total cholesterol is 184, HDL 57, LDL 93, triglycerides 172 (recently 245), normal liver enzymes, normal renal panel, normal CBC and TSH.  Not diabetic.  Normotensive at 130/77.  She is an avid exerciser.  In summer, she walks from 4-10 miles a day and in winter does exercise classes at the gym.  No symptoms of chest pain, shortness of breath, lower extremity edema or palpitations.  She admits to liking pasta, bread, rice, sweets.  Her weight is stable at 127 pounds with a BMI of 23 kg/m2.  Social alcohol intake, never tobacco user.    DIAGNOSES:  1.  Elevated coronary calcium score at the 96th percentile of risk.  The patient is very physically active and has no cardiovascular symptoms.  Therefore, stress testing not indicated.  2.  Mixed dyslipidemia with high LDL.  LDL goal is 70 or below.  We will increase rosuvastatin.  Talked about dietary  modification.      PLAN:    1.  Medications:  - Increase rosuvastatin from 20 to 40 mg daily.  New prescription done.  Discussed Potential side effect of myalgia.  She will contact my office if she develops any issues.    2.  Lifestyle modification, detailed education and counseling and review of the implications of elevated coronary calcium score using the heart model completed.     3.  In 6 months, follow up with me with fasting lipids and liver enzymes at the [place] office (patient request this as she lives in [place]).    Established patient.  Total time today 30 minutes.    cc:  ALEXANDRA Temple  [facility]  [address] [address]    Melvin Bianchi MD        D: 2023   T: 2023   MT: michael    Name:     ASHER SKELTON  MRN:      [id number]        Account:      [id number]   :      1945           Service Date: 2023       Document: [id number]      Thank you for allowing me to participate in the care of your patient.      Sincerely,     Melvin Bianchi MD     [facility]  cc:   Melvin Bianchi MD  [address] [place] [id number]

## 2023-04-11 ENCOUNTER — OFFICE VISIT (OUTPATIENT)
Dept: FAMILY MEDICINE | Facility: CLINIC | Age: 78
End: 2023-04-11

## 2023-04-11 VITALS
DIASTOLIC BLOOD PRESSURE: 68 MMHG | HEIGHT: 62 IN | BODY MASS INDEX: 22.26 KG/M2 | TEMPERATURE: 97 F | SYSTOLIC BLOOD PRESSURE: 124 MMHG | HEART RATE: 76 BPM | OXYGEN SATURATION: 98 % | WEIGHT: 121 LBS

## 2023-04-11 DIAGNOSIS — Z00.00 ENCOUNTER FOR GENERAL HEALTH EXAMINATION: Primary | ICD-10-CM

## 2023-04-11 DIAGNOSIS — E78.2 MIXED HYPERLIPIDEMIA: ICD-10-CM

## 2023-04-11 DIAGNOSIS — R93.1 ELEVATED CORONARY ARTERY CALCIUM SCORE: ICD-10-CM

## 2023-04-11 PROCEDURE — 99397 PER PM REEVAL EST PAT 65+ YR: CPT | Mod: 25 | Performed by: PHYSICIAN ASSISTANT

## 2023-04-11 PROCEDURE — G0438 PPPS, INITIAL VISIT: HCPCS | Performed by: PHYSICIAN ASSISTANT

## 2023-04-11 RX ORDER — CALCIUM CARBONATE 500(1250)
1 TABLET ORAL DAILY
COMMUNITY
Start: 2023-04-11 | End: 2023-08-31

## 2023-04-11 RX ORDER — ROSUVASTATIN CALCIUM 40 MG/1
40 TABLET, COATED ORAL DAILY
Qty: 100 TABLET | Refills: 5 | Status: CANCELLED | OUTPATIENT
Start: 2023-04-11

## 2023-04-11 NOTE — PROGRESS NOTES
Sapna Ibrahim is a 78 year old female who presents for Medicare Annual Wellness Visit.    Current providers caring for this patient include:  Patient Care Team:  Verena Freedman PA-C as PCP - General (Family Medicine)  Verena Freedman PA-C as Assigned PCP  Melvin Bianchi MD as Assigned Heart and Vascular Provider    Complete Medical and Social history reviewed with patient, outlined below.    Patient Active Problem List   Diagnosis     Mixed hyperlipidemia     Allergic state     ACP (advance care planning)     Health Care Home     Calculus of kidney     Dermatitis     Senile nuclear sclerosis       Past Medical History:   Diagnosis Date     Allergic rhinitis due to pollen      Mixed dyslipidemia        Past Surgical History:   Procedure Laterality Date     CATARACT IOL, RT/LT Bilateral      COLONOSCOPY  9/2014    hemorrhoids;      COMBINED CYSTOSCOPY, INSERT STENT URETER(S)  12/10/2013    Procedure: COMBINED CYSTOSCOPY, INSERT STENT URETER(S);;  Surgeon: Milton Rangel MD;  Location:  OR     EXTRACORPOREAL SHOCK WAVE LITHOTRIPSY (ESWL)  12/10/2013    Procedure: EXTRACORPOREAL SHOCK WAVE LITHOTRIPSY (ESWL);  RIGHT EXTRACORPOREAL SHOCK WAVE LITHOTRIPSY, CYSTOSCOPY WITH RIGHT STENT PLACEMENT, RIGHT RETROGRADE;  Surgeon: Milton Rangel MD;  Location:  OR     EXTRACORPOREAL SHOCK WAVE LITHOTRIPSY (ESWL)  5/13/2014    Procedure: EXTRACORPOREAL SHOCK WAVE LITHOTRIPSY (ESWL);  Surgeon: Milton Rangel MD;  Location:  OR     HC LAPAROSCOPY, SURGICAL; CHOLECYSTECTOMY  1996    open procedure     HC REMOVAL OF TONSILS,12+ Y/O      age 21     HCL PAP SMEAR  1999    normal     RELEASE CARPAL TUNNEL  12/2015     VASCULAR SURGERY  1994    microvascular decompression of trigeminal nerve (MVD)     Z NONSPECIFIC PROCEDURE  1994    microvascular decompression of the right trigeminal nerve     [facility] VAGINAL HYSTERECTOMY  1983    menorhagia; both ovaries remain     ZZ COLONOSCOPY THRU STOMA,  "DIAGNOSTIC  03    normal; diverticulosis       Family History   Adopted: Yes   Problem Relation Age of Onset     Breast Cancer No family hx of      Ovarian Cancer No family hx of        Social History     Tobacco Use     Smoking status: Former     Packs/day: 0.50     Years: 20.00     Pack years: 10.00     Types: Cigarettes     Quit date:      Years since quittin.2     Passive exposure: Never     Smokeless tobacco: Never   Vaping Use     Vaping status: Not on file   Substance Use Topics     Alcohol use: Not Currently     Comment: 1 every month or less       Diet: regular, low salt/low fat  Physical Activity: patient exercises 7 times weekly  Depression Screen:    Over the past 2 weeks, patient has felt down, depressed, or hopeless:  No    Over the past 2 weeks, patient has felt little interest or pleasure in doing things: No    Functional ability/Safety screen:  Up and go test (able to get up and walk longer than 30 seconds): Passed  Patient needs assistance with: nothing  Patient's home has the following possible safety concerns: none identified  Patient has concerns about her hearing:  wears hearing aides  Cognitive Screen  Patient repeats three objects (ball, flag, tree)      Clock drawing test:   NORMAL  Recalls three objects after 3 minutes (ball,flag,tree):                                                                                               recalls 3 objects (3 points)  Physical Exam:  /68 (BP Location: Right arm, Patient Position: Sitting, Cuff Size: Adult Regular)   Pulse 76   Temp 97  F (36.1  C) (Temporal)   Ht 1.575 m (5' 2\")   Wt 54.9 kg (121 lb)   SpO2 98%   BMI 22.13 kg/m     Body mass index is 22.13 kg/m .     End of Life Planning:   Patient currently has an advanced directive: No.  I have verified the patient's ablity to prepare an advanced directive/make health care decisions.  Literature was provided to assist patient in preparing an advanced " directive.    Education/Counseling:   Based on review of the above information, the following items were addressed:      No concerns    Appropriate preventive services were discussed with this patient, including applicable screening as appropriate for cardiovascular disease, diabetes, osteopenia/osteoporosis, and glaucoma.  As appropriate for age/gender, discussed screening for colorectal cancer, prostate cancer, breast cancer, and cervical cancer.   Checklist reviewing preventive services available has been given to the patient.    Verena Freedman PA-C  4/11/2023

## 2023-04-11 NOTE — NURSING NOTE
Chief Complaint   Patient presents with     Physical     Non fasting px     Wellness Visit         Pre-visit Screening:  Immunizations:  up to date  Colonoscopy:  is up to date  Mammogram: is up to date  Asthma Action Test/Plan:  NA  PHQ9:  NA  GAD7:  NA  Questioned patient about current smoking habits Pt. quit smoking some time ago.  Ok to leave detailed message on voice mail for today's visit only Yes, phone # [phone]

## 2023-08-28 ENCOUNTER — LAB (OUTPATIENT)
Dept: LAB | Facility: CLINIC | Age: 78
End: 2023-08-28
Payer: COMMERCIAL

## 2023-08-28 DIAGNOSIS — E78.2 MIXED HYPERLIPIDEMIA: ICD-10-CM

## 2023-08-28 DIAGNOSIS — R93.1 ELEVATED CORONARY ARTERY CALCIUM SCORE: ICD-10-CM

## 2023-08-28 LAB
ALBUMIN SERPL BCG-MCNC: 4.4 G/DL (ref 3.5–5.2)
ALP SERPL-CCNC: 64 U/L (ref 35–104)
ALT SERPL W P-5'-P-CCNC: 15 U/L (ref 0–50)
ANION GAP SERPL CALCULATED.3IONS-SCNC: 10 MMOL/L (ref 7–15)
AST SERPL W P-5'-P-CCNC: 21 U/L (ref 0–45)
BILIRUB SERPL-MCNC: 0.6 MG/DL
BUN SERPL-MCNC: 14.7 MG/DL (ref 8–23)
CALCIUM SERPL-MCNC: 9.4 MG/DL (ref 8.8–10.2)
CHLORIDE SERPL-SCNC: 104 MMOL/L (ref 98–107)
CHOLEST SERPL-MCNC: 140 MG/DL
CREAT SERPL-MCNC: 0.85 MG/DL (ref 0.51–0.95)
DEPRECATED HCO3 PLAS-SCNC: 29 MMOL/L (ref 22–29)
GFR SERPL CREATININE-BSD FRML MDRD: 70 ML/MIN/1.73M2
GLUCOSE SERPL-MCNC: 96 MG/DL (ref 70–99)
HDLC SERPL-MCNC: 48 MG/DL
LDLC SERPL CALC-MCNC: 50 MG/DL
NONHDLC SERPL-MCNC: 92 MG/DL
POTASSIUM SERPL-SCNC: 3.9 MMOL/L (ref 3.4–5.3)
PROT SERPL-MCNC: 6.7 G/DL (ref 6.4–8.3)
SODIUM SERPL-SCNC: 143 MMOL/L (ref 136–145)
TRIGL SERPL-MCNC: 209 MG/DL

## 2023-08-28 PROCEDURE — 80061 LIPID PANEL: CPT | Performed by: INTERNAL MEDICINE

## 2023-08-28 PROCEDURE — 36415 COLL VENOUS BLD VENIPUNCTURE: CPT | Performed by: INTERNAL MEDICINE

## 2023-08-28 PROCEDURE — 80053 COMPREHEN METABOLIC PANEL: CPT | Performed by: INTERNAL MEDICINE

## 2023-08-31 ENCOUNTER — OFFICE VISIT (OUTPATIENT)
Dept: CARDIOLOGY | Facility: CLINIC | Age: 78
End: 2023-08-31
Attending: INTERNAL MEDICINE
Payer: COMMERCIAL

## 2023-08-31 VITALS
SYSTOLIC BLOOD PRESSURE: 136 MMHG | BODY MASS INDEX: 22.56 KG/M2 | HEIGHT: 62 IN | DIASTOLIC BLOOD PRESSURE: 68 MMHG | WEIGHT: 122.6 LBS | HEART RATE: 76 BPM | OXYGEN SATURATION: 98 %

## 2023-08-31 DIAGNOSIS — I25.10 CORONARY ARTERY DISEASE INVOLVING NATIVE CORONARY ARTERY OF NATIVE HEART WITHOUT ANGINA PECTORIS: ICD-10-CM

## 2023-08-31 DIAGNOSIS — E78.2 MIXED HYPERLIPIDEMIA: Primary | ICD-10-CM

## 2023-08-31 DIAGNOSIS — R93.1 ELEVATED CORONARY ARTERY CALCIUM SCORE: ICD-10-CM

## 2023-08-31 PROCEDURE — 99214 OFFICE O/P EST MOD 30 MIN: CPT | Performed by: INTERNAL MEDICINE

## 2023-08-31 RX ORDER — ROSUVASTATIN CALCIUM 40 MG/1
40 TABLET, COATED ORAL DAILY
Qty: 100 TABLET | Refills: 6 | Status: SHIPPED | OUTPATIENT
Start: 2023-08-31

## 2023-08-31 NOTE — PROGRESS NOTES
SERVICE DATE: 8/31/2023    PRIMARY CARE PROVIDER:  Verena Freedman  [address], [facility] [address]  [facility] [address]    REASON FOR VISIT:  Hyperlipidemia.   Elevated coronary artery calcium score.    HISTORY OF PRESENT ILLNESS:  It was my pleasure to follow-up with Sapna.  She is a delightful, much younger appearing 78-year-old female, physically active, lives with her ex- (they are roommates).    Her history is significant for mixed dyslipidemia with very high LDL before statin therapy, asymptomatic coronary artery disease equivalent with an elevated coronary calcium score of 1564 (96th percentile of risk, remote and brief tobacco user (quit 40 years ago), not aware of family history as she is adopted.    At her last visit, I had increased rosuvastatin from 20 mg daily to 40 mg daily.  She is tolerating this without any side effects.  She has had an excellent therapeutic response with total cholesterol 140 (down from 190), LDL is 58 (down from 93), strides remain I 290 (she enjoys carbohydrates and sweets).  She is an avid walker for many years.  She has been in a walker for years and does 4 to 7 miles daily.  Remains asymptomatic.  Blood pressure  136/68, pulse of 76 bpm.    I reviewed lipid panel, basic metabolic panel (normal creatinine of 0.85), normal CBC with a hemoglobin of 15.6.    I independently interpreted her ECG dated 1/11/2023.  Shows normal sinus rhythm.     I reviewed coronary artery calcium study, as documented above.    DIAGNOSES/ASSESSMENT:  1.  Hyperlipidemia with goal LDL less than 70.  Continue rosuvastatin 40 mg daily.  Physically active.  2.  Hypertriglyceridemia.  Counseled patient about decreasing carbohydrate intake, decreasing processed foods intake, and strength training twice a week.  She is motivated to make these changes.  3.  Coronary artery disease equivalent and elevated coronary calcium score.  Asymptomatic.  Will get echocardiogram in a year.    PLAN:  1.  Renewed  "rosuvastatin 40 mg daily.  2.  Dietary modification twice a week strength training counseling as above.  3.  I have ordered follow-up with me in 1 year with transthoracic echocardiogram and fasting blood.      Melvin Bianchi MD      Established patient.   The level of medical decision making during this visit was of moderate complexity.          PHYSICAL EXAMINATION:  Vitals: /68   Pulse 76   Ht 1.575 m (5' 2\")   Wt 55.6 kg (122 lb 9.6 oz)   SpO2 98%   BMI 22.42 kg/m    Wt Readings from Last 5 Encounters:   08/31/23 55.6 kg (122 lb 9.6 oz)   04/11/23 54.9 kg (121 lb)   02/08/23 57.6 kg (127 lb)   01/11/23 57.2 kg (126 lb)   03/22/22 60.3 kg (133 lb)     CARDIOVASCULAR:  Regular heart sounds.  No murmur. No carotid bruit.  RESPIRATORY:  Normal breath sounds. No rales or wheeze.  EXTREMITIES:  No edema.    Encounter Diagnoses   Name Primary?    Mixed hyperlipidemia Yes    Elevated coronary artery calcium score     Coronary artery disease involving native coronary artery of native heart without angina pectoris          Orders Placed This Encounter   Procedures    CBC with platelets    Lipid Profile    Basic metabolic panel    Follow-Up with Cardiology    Echocardiogram Complete           CURRENT MEDICATIONS:  Current Outpatient Medications   Medication Sig Dispense Refill    co-enzyme Q-10 100 MG CAPS capsule Take 100 mg by mouth daily      Multiple Vitamins-Minerals (PRESERVISION AREDS) TABS Take by mouth daily      rosuvastatin (CRESTOR) 40 MG tablet Take 1 tablet (40 mg) by mouth daily 100 tablet 6    UNABLE TO FIND daily MEDICATION NAME: Tumeric      Vitamin D3 (CHOLECALCIFEROL) 125 MCG (5000 UT) tablet Take by mouth daily           ALLERGIES:  Allergies   Allergen Reactions    Meperidine Hcl      sick to stomache    Percodan [Aspirin]        PAST MEDICAL HISTORY:    Past Medical History:   Diagnosis Date    Allergic rhinitis due to pollen     Elevated coronary artery calcium score     Mixed " dyslipidemia        PAST SURGICAL HISTORY:    Past Surgical History:   Procedure Laterality Date    CATARACT IOL, RT/LT Bilateral     COLONOSCOPY  2014    hemorrhoids;     COMBINED CYSTOSCOPY, INSERT STENT URETER(S)  12/10/2013    Procedure: COMBINED CYSTOSCOPY, INSERT STENT URETER(S);;  Surgeon: Milton Rangel MD;  [facility]    EXTRACORPOREAL SHOCK WAVE LITHOTRIPSY (ESWL)  12/10/2013    Procedure: EXTRACORPOREAL SHOCK WAVE LITHOTRIPSY (ESWL);  RIGHT EXTRACORPOREAL SHOCK WAVE LITHOTRIPSY, CYSTOSCOPY WITH RIGHT STENT PLACEMENT, RIGHT RETROGRADE;  Surgeon: Milton Rangel MD;  Location: [facility]    EXTRACORPOREAL SHOCK WAVE LITHOTRIPSY (ESWL)  2014    Procedure: EXTRACORPOREAL SHOCK WAVE LITHOTRIPSY (ESWL);  Surgeon: Milton Rangel MD;  Location: [facility] OR    HC LAPAROSCOPY, SURGICAL; CHOLECYSTECTOMY      open procedure    HC REMOVAL OF TONSILS,12+ Y/O      age 21    HCL PAP SMEAR      normal    RELEASE CARPAL TUNNEL  2015    VASCULAR SURGERY      microvascular decompression of trigeminal nerve (MVD)    Z NONSPECIFIC PROCEDURE      microvascular decompression of the right trigeminal nerve    Z VAGINAL HYSTERECTOMY      menorhagia; both ovaries remain    ZZHC COLONOSCOPY THRU STOMA, DIAGNOSTIC  03    normal; diverticulosis       FAMILY HISTORY:    Family History   Adopted: Yes   Problem Relation Age of Onset    Multiple Sclerosis Daughter        SOCIAL HISTORY:    Social History     Socioeconomic History    Marital status:      Spouse name: None    Number of children: 2    Years of education: None    Highest education level: None   Tobacco Use    Smoking status: Former     Packs/day: 0.50     Years: 20.00     Pack years: 10.00     Types: Cigarettes     Start date:      Quit date:      Years since quittin.6     Passive exposure: Never    Smokeless tobacco: Never   Substance and Sexual Activity    Alcohol use: Not Currently     Comment: 1-2 drinks every 2 weeks     Drug use: Never    Sexual activity: Yes     Partners: Male     Birth control/protection: Post-menopausal   Other Topics Concern    Special Diet No    Exercise Yes     Comment: walking everyday

## 2023-08-31 NOTE — LETTER
8/31/2023    Verena Freedman PA-C  [address], [place] [address]  [facility] [address]    RE: Sapna Ibrahim       Dear Colleague,     I had the pleasure of seeing Sapna LYNDA Patrice in the [facility].    SERVICE DATE: 8/31/2023    PRIMARY CARE PROVIDER:  Verena Freedman  [address], [place] [address]  [facility] [address]    REASON FOR VISIT:  Hyperlipidemia.   Elevated coronary artery calcium score.    HISTORY OF PRESENT ILLNESS:  It was my pleasure to follow-up with Sapna.  She is a delightful, much younger appearing 78-year-old female, physically active, lives with her ex- (they are roommates).    Her history is significant for mixed dyslipidemia with very high LDL before statin therapy, asymptomatic coronary artery disease equivalent with an elevated coronary calcium score of 1564 (96th percentile of risk, remote and brief tobacco user (quit 40 years ago), not aware of family history as she is adopted.    At her last visit, I had increased rosuvastatin from 20 mg daily to 40 mg daily.  She is tolerating this without any side effects.  She has had an excellent therapeutic response with total cholesterol 140 (down from 190), LDL is 58 (down from 93), strides remain I 290 (she enjoys carbohydrates and sweets).  She is an avid walker for many years.  She has been in a walker for years and does 4 to 7 miles daily.  Remains asymptomatic.  Blood pressure  136/68, pulse of 76 bpm.    I reviewed lipid panel, basic metabolic panel (normal creatinine of 0.85), normal CBC with a hemoglobin of 15.6.    I independently interpreted her ECG dated 1/11/2023.  Shows normal sinus rhythm.     I reviewed coronary artery calcium study, as documented above.    DIAGNOSES/ASSESSMENT:  1.  Hyperlipidemia with goal LDL less than 70.  Continue rosuvastatin 40 mg daily.  Physically active.  2.  Hypertriglyceridemia.  Counseled patient about decreasing carbohydrate intake, decreasing processed foods intake, and  "strength training twice a week.  She is motivated to make these changes.  3.  Coronary artery disease equivalent and elevated coronary calcium score.  Asymptomatic.  Will get echocardiogram in a year.    PLAN:  1.  Renewed rosuvastatin 40 mg daily.  2.  Dietary modification twice a week strength training counseling as above.  3.  I have ordered follow-up with me in 1 year with transthoracic echocardiogram and fasting blood.      Melvin Bianchi MD      Established patient.   The level of medical decision making during this visit was of moderate complexity.          PHYSICAL EXAMINATION:  Vitals: /68   Pulse 76   Ht 1.575 m (5' 2\")   Wt 55.6 kg (122 lb 9.6 oz)   SpO2 98%   BMI 22.42 kg/m    Wt Readings from Last 5 Encounters:   08/31/23 55.6 kg (122 lb 9.6 oz)   04/11/23 54.9 kg (121 lb)   02/08/23 57.6 kg (127 lb)   01/11/23 57.2 kg (126 lb)   03/22/22 60.3 kg (133 lb)     CARDIOVASCULAR:  Regular heart sounds.  No murmur. No carotid bruit.  RESPIRATORY:  Normal breath sounds. No rales or wheeze.  EXTREMITIES:  No edema.    Encounter Diagnoses   Name Primary?    Mixed hyperlipidemia Yes    Elevated coronary artery calcium score     Coronary artery disease involving native coronary artery of native heart without angina pectoris          Orders Placed This Encounter   Procedures    CBC with platelets    Lipid Profile    Basic metabolic panel    Follow-Up with Cardiology    Echocardiogram Complete           CURRENT MEDICATIONS:  Current Outpatient Medications   Medication Sig Dispense Refill    co-enzyme Q-10 100 MG CAPS capsule Take 100 mg by mouth daily      Multiple Vitamins-Minerals (PRESERVISION AREDS) TABS Take by mouth daily      rosuvastatin (CRESTOR) 40 MG tablet Take 1 tablet (40 mg) by mouth daily 100 tablet 6    UNABLE TO FIND daily MEDICATION NAME: Tumeric      Vitamin D3 (CHOLECALCIFEROL) 125 MCG (5000 UT) tablet Take by mouth daily           ALLERGIES:  Allergies   Allergen Reactions    " Meperidine Hcl      sick to stomache    Percodan [Aspirin]        PAST MEDICAL HISTORY:    Past Medical History:   Diagnosis Date    Allergic rhinitis due to pollen     Elevated coronary artery calcium score     Mixed dyslipidemia        PAST SURGICAL HISTORY:    Past Surgical History:   Procedure Laterality Date    CATARACT IOL, RT/LT Bilateral     COLONOSCOPY  9/2014    hemorrhoids;     COMBINED CYSTOSCOPY, INSERT STENT URETER(S)  12/10/2013    Procedure: COMBINED CYSTOSCOPY, INSERT STENT URETER(S);;  Surgeon: Milton Rangel MD;  [facility]    EXTRACORPOREAL SHOCK WAVE LITHOTRIPSY (ESWL)  12/10/2013    Procedure: EXTRACORPOREAL SHOCK WAVE LITHOTRIPSY (ESWL);  RIGHT EXTRACORPOREAL SHOCK WAVE LITHOTRIPSY, CYSTOSCOPY WITH RIGHT STENT PLACEMENT, RIGHT RETROGRADE;  Surgeon: Milton Rangel MD;  Location:  OR    EXTRACORPOREAL SHOCK WAVE LITHOTRIPSY (ESWL)  5/13/2014    Procedure: EXTRACORPOREAL SHOCK WAVE LITHOTRIPSY (ESWL);  Surgeon: Milton Rangel MD;  Location: [facility]    HC LAPAROSCOPY, SURGICAL; CHOLECYSTECTOMY  1996    open procedure    HC REMOVAL OF TONSILS,12+ Y/O      age 21    HCL PAP SMEAR  1999    normal    RELEASE CARPAL TUNNEL  12/2015    VASCULAR SURGERY  1994    microvascular decompression of trigeminal nerve (MVD)    [facility] NONSPECIFIC PROCEDURE  1994    microvascular decompression of the right trigeminal nerve    [facility] VAGINAL HYSTERECTOMY  1983    menorhagia; both ovaries remain    ZZHC COLONOSCOPY THRU STOMA, DIAGNOSTIC  7/8/03    normal; diverticulosis       FAMILY HISTORY:    Family History   Adopted: Yes   Problem Relation Age of Onset    Multiple Sclerosis Daughter        SOCIAL HISTORY:    Social History     Socioeconomic History    Marital status:      Spouse name: None    Number of children: 2    Years of education: None    Highest education level: None   Tobacco Use    Smoking status: Former     Packs/day: 0.50     Years: 20.00     Pack years: 10.00     Types: Cigarettes     Start  date:      Quit date:      Years since quittin.6     Passive exposure: Never    Smokeless tobacco: Never   Substance and Sexual Activity    Alcohol use: Not Currently     Comment: 1-2 drinks every 2 weeks    Drug use: Never    Sexual activity: Yes     Partners: Male     Birth control/protection: Post-menopausal   Other Topics Concern    Special Diet No    Exercise Yes     Comment: walking everyday       Thank you for allowing me to participate in the care of your patient.      Sincerely,     Melvin Bianchi MD     [facility]  cc:   Melvin Bianchi MD  [address] [place] [id number]

## 2024-01-12 ENCOUNTER — HOSPITAL ENCOUNTER (OUTPATIENT)
Dept: MAMMOGRAPHY | Facility: CLINIC | Age: 79
Discharge: HOME | End: 2024-01-12
Attending: PHYSICIAN ASSISTANT | Admitting: PHYSICIAN ASSISTANT
Payer: COMMERCIAL

## 2024-01-12 DIAGNOSIS — Z12.31 VISIT FOR SCREENING MAMMOGRAM: ICD-10-CM

## 2024-01-12 PROCEDURE — 77063 BREAST TOMOSYNTHESIS BI: CPT

## 2024-03-14 ENCOUNTER — TRANSFERRED RECORDS (OUTPATIENT)
Dept: FAMILY MEDICINE | Facility: CLINIC | Age: 79
End: 2024-03-14

## 2024-03-18 NOTE — PROGRESS NOTES
Chief Complaint:  Physical Exam    SUBJECTIVE:   Sapna Ibrahim is a 78 year old female presents for routine health maintenance.    Current concerns: No concerns. Doing well. Planning fasting labs through Cardiology this August.    Menses are absent    No LMP recorded. Patient is postmenopausal.  Was last Pap smear normal: Yes  Due for mammogram:  No    Body mass index is 22.13 kg/m .    Present exercise habits:  3-5 times/week  Present dietary habits:  eats regular meals and follows a balanced nutrition diet    Calcium intake:  Takes supplement.   Vit D intake: is taking supplement    Is the patient a smoker? No  If yes, smoking cessation advised and counseling provided.     Cardiovascular risk factors: lipids    Over the past few weeks, have you felt down or depressed? Little interest or pleasure in doing things? No concerns.     If in a relationship are there any Domestic violence concern: No    Last dental appointment:  2 years ago  Last optical appointment:  this year    Was the patient born between [birth year] and has not had Hep C testing?  Patient has already been tested    I have reviewed the following histories: Past Medical History, Past Surgical History, Social History, Family History, Problem List, Medication List and Allergies    Past Medical History:   Diagnosis Date     Allergic rhinitis due to pollen      Mixed dyslipidemia      Family History   Adopted: Yes   Problem Relation Age of Onset     Multiple Sclerosis Daughter      Breast Cancer No family hx of      Ovarian Cancer No family hx of      Social History     Socioeconomic History     Marital status:      Spouse name: Not on file     Number of children: 2     Years of education: Not on file     Highest education level: Not on file   Occupational History     Not on file   Tobacco Use     Smoking status: Former     Packs/day: 0.50     Years: 20.00     Pack years: 10.00     Types: Cigarettes     Quit date:      Years since quittin.2  [facility]  History and Physical Update    Pt Name: Mitul Hurtado  MRN: [id number]  YOB: 1950  Date of evaluation: 3/18/2024    I have examined the patient and reviewed the H&P/Consult and there are no changes to the patient or plans.      Jem Chun MD  Electronically signed 3/18/2024 at 8:34 AM         Passive exposure: Never     Smokeless tobacco: Never   Vaping Use     Vaping status: Not on file   Substance and Sexual Activity     Alcohol use: Not Currently     Comment: 1 every month or less     Drug use: Never     Sexual activity: Yes     Partners: Male     Birth control/protection: Post-menopausal   Other Topics Concern      Service Not Asked     Blood Transfusions Not Asked     Caffeine Concern Not Asked     Occupational Exposure Not Asked     Hobby Hazards Not Asked     Sleep Concern Not Asked     Stress Concern Not Asked     Weight Concern Not Asked     Special Diet No     Back Care Not Asked     Exercise Yes     Comment: walking everyday     Bike Helmet Not Asked     Seat Belt Not Asked     Self-Exams Not Asked     Parent/sibling w/ CABG, MI or angioplasty before 65F 55M? Not Asked   Social History Narrative     Not on file     Social Determinants of Health     Financial Resource Strain: Not on file   Food Insecurity: Not on file   Transportation Needs: Not on file   Physical Activity: Not on file   Stress: Not on file   Social Connections: Not on file   Intimate Partner Violence: Not on file   Housing Stability: Not on file     Past Surgical History:   Procedure Laterality Date     CATARACT IOL, RT/LT Bilateral      COLONOSCOPY  9/2014    hemorrhoids;      COMBINED CYSTOSCOPY, INSERT STENT URETER(S)  12/10/2013    Procedure: COMBINED CYSTOSCOPY, INSERT STENT URETER(S);;  Surgeon: Milton Rangel MD;  Location:  OR     EXTRACORPOREAL SHOCK WAVE LITHOTRIPSY (ESWL)  12/10/2013    Procedure: EXTRACORPOREAL SHOCK WAVE LITHOTRIPSY (ESWL);  RIGHT EXTRACORPOREAL SHOCK WAVE LITHOTRIPSY, CYSTOSCOPY WITH RIGHT STENT PLACEMENT, RIGHT RETROGRADE;  Surgeon: Milton Rangel MD;  Location:  OR     EXTRACORPOREAL SHOCK WAVE LITHOTRIPSY (ESWL)  5/13/2014    Procedure: EXTRACORPOREAL SHOCK WAVE LITHOTRIPSY (ESWL);  Surgeon: Milton Rangel MD;  Location:  OR      LAPAROSCOPY, SURGICAL; CHOLECYSTECTOMY   1996    open procedure      REMOVAL OF TONSILS,12+ Y/O      age 21     HCL PAP SMEAR  1999    normal     RELEASE CARPAL TUNNEL  12/2015     VASCULAR SURGERY  1994    microvascular decompression of trigeminal nerve (MVD)     Z NONSPECIFIC PROCEDURE  1994    microvascular decompression of the right trigeminal nerve     Z VAGINAL HYSTERECTOMY  1983    menorhagia; both ovaries remain     ZZHC COLONOSCOPY THRU STOMA, DIAGNOSTIC  7/8/03    normal; diverticulosis       ROS:  E/M: NEGATIVE for ear, nose, mouth and throat problems  R: NEGATIVE for significant/chronic cough or SOB  CV: NEGATIVE for chest pain or palpitations  GI: NEGATIVE for abdominal pain, chronic diarrhea or constipation  :  NEGATIVE for dysuria, hematuria or vaginal discharge. No sexual health concerns.       Current Outpatient Medications   Medication     calcium carbonate (OS-FARRAH) 500 MG tablet     co-enzyme Q-10 100 MG CAPS capsule     Multiple Vitamins-Minerals (PRESERVISION AREDS) TABS     rosuvastatin (CRESTOR) 40 MG tablet     UNABLE TO FIND     Vitamin D3 (CHOLECALCIFEROL) 125 MCG (5000 UT) tablet     cetirizine (ZYRTEC) 10 MG tablet     No current facility-administered medications for this visit.       Patient Active Problem List    Diagnosis Date Noted     Elevated coronary artery calcium score 04/11/2023     Priority: Medium     Senile nuclear sclerosis 07/30/2015     Priority: Medium     Dermatitis 05/08/2014     Priority: Medium     Calculus of kidney 12/05/2013     Priority: Medium     First episode 10/27/13       Mixed hyperlipidemia      Priority: Medium     Problem list name updated by automated process. Provider to review       Allergic state      Priority: Medium     Problem list name updated by automated process. Provider to review       Health Care Home 12/31/2012     Priority: Low     State Tier Level:  Tier 1  Status:  n/a  Care Coordinator:      See Letters for [facility] Care Plan           ACP (advance care planning) 05/25/2011  "    Priority: Low     Advance Care Planning:   ACP Review and Resources Provided:  Reviewed chart for advance care plan.  Sapna Ibrahim has no plan or code status on file. Discussed available resources and provided with information. Confirmed code status reflects current choices pending further ACP discussions.  Confirmed/documented designated decision maker(s). See permanent comments section of demographics in clinical tab.   Added by Dawna Espinosa on 5/8/2014                   OBJECTIVE:  /68 (BP Location: Right arm, Patient Position: Sitting, Cuff Size: Adult Regular)   Pulse 76   Temp 97  F (36.1  C) (Temporal)   Ht 1.575 m (5' 2\")   Wt 54.9 kg (121 lb)   SpO2 98%   BMI 22.13 kg/m          General: 78 year old female who appears her stated age. Vital signs noted.  Head: Normocephalic  Eyes: pupils equal round reactive to light and accomodation, extra ocular movements intact  Ears: external canals and TMs free of abnormalities  Nose: patent, without mucosal abnormalities  Mouth and throat: without erythema or lesions of the mucosa  Neck: supple, without adenopathy or thyromegaly  Lungs: clear to auscultation, no wheezing or crackles  Breasts: Pt declined. No concerns.  Cv: regular rate and rhythm, normal s1 and s2 without murmur or click  Abd: soft, non-tender, no masses, no hepatomegaly or splenomegaly.   (female): Pt declined. No concerns.  Ms: normal muscle tone & symmetry  Skin: clear to inspection and with no palpable abnormalities.  Neuro: sensation and motor function grossly intact; cranial nerves without obvious abnormalities.    ASSESSMENT/PLAN:    Encounter for general health examination  Sapna is doing very well today. Not due for fasting labs, and is updating this in 4 months.     Mixed hyperlipidemia  Elevated coronary artery calcium score  Continue working closely with obesity.      reports that she quit smoking about 33 years ago. Her smoking use included cigarettes. She has a " "10.00 pack-year smoking history. She has never been exposed to tobacco smoke. She has never used smokeless tobacco.    Estimated body mass index is 22.13 kg/m  as calculated from the following:    Height as of this encounter: 1.575 m (5' 2\").    Weight as of this encounter: 54.9 kg (121 lb).    Labs pending:      Fasting glucose      Fasting lipids  Meds Suggested:      Vitamin D       Calcium  Tests Recommended:      Regular Dental Examinations        Eye exam  Behavior Modifications:       Cardiovascular exercise 3 times per week--enough to get your Target Heart rate  Immunizations suggested:       Zostivax  After age of 50  Other recommendations:     BMI noted and discussed      Regular breast exam     Encouraged My Chart    Counseling Resources:  ATP IV Guidelines  Pooled Cohorts Equation Calculator  Breast Cancer Risk Calculator  FRAX Risk Assessment  ICSI Preventive Guidelines  Dietary Guidelines for Americans, 2010  USDA's MyPlate    Verena Freedman PA-C  4/11/2023    "

## 2024-04-12 ENCOUNTER — OFFICE VISIT (OUTPATIENT)
Dept: FAMILY MEDICINE | Facility: CLINIC | Age: 79
End: 2024-04-12

## 2024-04-12 VITALS
HEIGHT: 62 IN | DIASTOLIC BLOOD PRESSURE: 70 MMHG | WEIGHT: 125 LBS | SYSTOLIC BLOOD PRESSURE: 124 MMHG | RESPIRATION RATE: 20 BRPM | HEART RATE: 68 BPM | TEMPERATURE: 97.7 F | BODY MASS INDEX: 23 KG/M2

## 2024-04-12 DIAGNOSIS — R93.1 ELEVATED CORONARY ARTERY CALCIUM SCORE: ICD-10-CM

## 2024-04-12 DIAGNOSIS — Z00.00 WELL WOMAN EXAM WITHOUT GYNECOLOGICAL EXAM: ICD-10-CM

## 2024-04-12 DIAGNOSIS — M85.852 OSTEOPENIA OF LEFT HIP: ICD-10-CM

## 2024-04-12 DIAGNOSIS — Z00.00 ENCOUNTER FOR MEDICARE ANNUAL WELLNESS EXAM: Primary | ICD-10-CM

## 2024-04-12 DIAGNOSIS — E78.2 MIXED HYPERLIPIDEMIA: ICD-10-CM

## 2024-04-12 LAB
BUN SERPL-MCNC: 16 MG/DL (ref 7–25)
BUN/CREATININE RATIO: 16.5 (ref 6–32)
CALCIUM SERPL-MCNC: 9.4 MG/DL (ref 8.6–10.3)
CHLORIDE SERPLBLD-SCNC: 102.1 MMOL/L (ref 98–110)
CO2 SERPL-SCNC: 29.3 MMOL/L (ref 20–32)
CREAT SERPL-MCNC: 0.97 MG/DL (ref 0.6–1.3)
GLUCOSE SERPL-MCNC: 93 MG/DL (ref 60–99)
HEMOGLOBIN A1C: 5.8 % (ref 4–5.6)
POTASSIUM SERPL-SCNC: 4.33 MMOL/L (ref 3.5–5.3)
SODIUM SERPL-SCNC: 140 MMOL/L (ref 135–146)

## 2024-04-12 PROCEDURE — 36415 COLL VENOUS BLD VENIPUNCTURE: CPT

## 2024-04-12 PROCEDURE — 83036 HEMOGLOBIN GLYCOSYLATED A1C: CPT

## 2024-04-12 PROCEDURE — G0439 PPPS, SUBSEQ VISIT: HCPCS

## 2024-04-12 PROCEDURE — 99397 PER PM REEVAL EST PAT 65+ YR: CPT | Mod: 25

## 2024-04-12 PROCEDURE — 80048 BASIC METABOLIC PNL TOTAL CA: CPT

## 2024-04-12 RX ORDER — TURMERIC 400 MG
1 CAPSULE ORAL DAILY
COMMUNITY
Start: 2024-04-12

## 2024-04-12 NOTE — PATIENT INSTRUCTIONS
Thanks for coming in today Sapna.     I will send you a my chart message with your lab results.     I have placed the Dexa scan order. Please ensure you are taking vitamin D3 1000 international unit(s) daily and are getting at least 600 mg of calcium from your diet.     Please let me know if you have any questions or concerns.

## 2024-04-12 NOTE — NURSING NOTE
Sapna Ibrahim is here for a CPX and Wellness    Pre-visit planning  Immunizations -up to date  Colonoscopy -is up to date  Mammogram -is up to date  Asthma test --  PHQ9 -  MARVIN 7 -      Questioned patient about current smoking habits.  Pt. quit smoking some time ago.  Body mass index is 22.86 kg/m .  PULSE regular  My Chart: active  CLASSIFICATION OF OVERWEIGHT AND OBESITY BY BMI                        Obesity Class           BMI(kg/m2)  Underweight                                    < 18.5  Normal                                         18.5-24.9  Overweight                                     25.0-29.9  OBESITY                     I                  30.0-34.9                             II                 35.0-39.9  EXTREME OBESITY             III                >40                            Patient's  BMI Body mass index is 22.86 kg/m .      The patient has verbalized that it is ok to leave a detailed voice message on the patient's cell phone with results/recommendations from this visit.

## 2024-04-12 NOTE — PROGRESS NOTES
Sapna Ibrahim is a 79 year old female who presents for Medicare Annual Wellness Visit.    Current providers caring for this patient include:  Patient Care Team:  Verena Freedman PA-C as PCP - General (Family Medicine)  Verena Freedman PA-C as Assigned PCP  Melvin Bianchi MD as Assigned Heart and Vascular Provider    Complete Medical and Social history reviewed with patient, outlined below.    Patient Active Problem List   Diagnosis    Mixed hyperlipidemia    Allergic state    ACP (advance care planning)    Health Care Home    Calculus of kidney    Dermatitis    Senile nuclear sclerosis    Elevated coronary artery calcium score     Past Medical History:   Diagnosis Date    Allergic rhinitis due to pollen     Elevated coronary artery calcium score     Mixed dyslipidemia      Past Surgical History:   Procedure Laterality Date    CATARACT IOL, RT/LT Bilateral     COLONOSCOPY  9/2014    hemorrhoids;     COMBINED CYSTOSCOPY, INSERT STENT URETER(S)  12/10/2013    Procedure: COMBINED CYSTOSCOPY, INSERT STENT URETER(S);;  Surgeon: Milton Rangel MD;  Location:  OR    EXTRACORPOREAL SHOCK WAVE LITHOTRIPSY (ESWL)  12/10/2013    Procedure: EXTRACORPOREAL SHOCK WAVE LITHOTRIPSY (ESWL);  RIGHT EXTRACORPOREAL SHOCK WAVE LITHOTRIPSY, CYSTOSCOPY WITH RIGHT STENT PLACEMENT, RIGHT RETROGRADE;  Surgeon: Milton Rangel MD;  Location:  OR    EXTRACORPOREAL SHOCK WAVE LITHOTRIPSY (ESWL)  5/13/2014    Procedure: EXTRACORPOREAL SHOCK WAVE LITHOTRIPSY (ESWL);  Surgeon: Milton Rangel MD;  Location: [facility] OR    HC LAPAROSCOPY, SURGICAL; CHOLECYSTECTOMY  1996    open procedure    HC REMOVAL OF TONSILS,12+ Y/O      age 21    HCL PAP SMEAR  1999    normal    RELEASE CARPAL TUNNEL  12/2015    VASCULAR SURGERY  1994    microvascular decompression of trigeminal nerve (MVD)    Z NONSPECIFIC PROCEDURE  1994    microvascular decompression of the right trigeminal nerve    [facility] VAGINAL HYSTERECTOMY  1983    menorhagia;  "both ovaries remain    [facility] COLONOSCOPY THRU STOMA, DIAGNOSTIC  03    normal; diverticulosis     Family History   Adopted: Yes   Problem Relation Age of Onset    Multiple Sclerosis Daughter      Social History     Tobacco Use    Smoking status: Former     Current packs/day: 0.00     Average packs/day: 0.5 packs/day for 20.0 years (10.0 ttl pk-yrs)     Types: Cigarettes     Start date:      Quit date:      Years since quittin.3     Passive exposure: Never    Smokeless tobacco: Never   Substance Use Topics    Alcohol use: Not Currently     Comment: 1-2 drinks every 2 weeks     Diet: regular, low salt/low fat  Physical Activity: active without specific exercise program  Depression Screen:    Over the past 2 weeks, patient has felt down, depressed, or hopeless:  No    Over the past 2 weeks, patient has felt little interest or pleasure in doing things: No    Functional ability/Safety screen:  Up and go test (able to get up and walk longer than 30 seconds): Passed  Patient needs assistance with: nothing  Patient's home has the following possible safety concerns: none identified  Patient has concerns about her hearing:   hearing aids    Cognitive Screen  Patient repeats three objects (ball, flag, tree)      Clock drawing test:   NORMAL  Recalls three objects after 3 minutes (ball,flag,tree):                                                                                               recalls 3 objects (3 points)  Physical Exam:  /70 (BP Location: Right arm, Patient Position: Chair, Cuff Size: Adult Regular)   Pulse 68   Temp 97.7  F (36.5  C) (Temporal)   Resp 20   Ht 1.575 m (5' 2\")   Wt 56.7 kg (125 lb)   BMI 22.86 kg/m     Body mass index is 22.86 kg/m .     See physical exam in provider note below.     End of Life Planning:   Patient currently has an advanced directive: No.  I have verified the patient's ablity to prepare an advanced directive/make health care decisions.  Literature was " provided to assist patient in preparing an advanced directive.    Education/Counseling:   Based on review of the above information, the following items were addressed:    -Osteopenia treatment recommendations    Appropriate preventive services were discussed with this patient, including applicable screening as appropriate for cardiovascular disease, diabetes, osteopenia/osteoporosis, and glaucoma.  As appropriate for age/gender, discussed screening for colorectal cancer, prostate cancer, breast cancer, and cervical cancer.   Checklist reviewing preventive services available has been given to the patient.

## 2024-04-12 NOTE — PROGRESS NOTES
Preventive Care Visit  [facility]  Annika Buckley PA-C, Family Medicine  2024       SUBJECTIVE:   Sapna is a 79 year old, presenting for the following:  Physical and Wellness Visit      HPI    Sapna presents for her yearly physical.     Daily medications: Reviewed.     Concerns: None.      Past medical history and daily medications reviewed. Reviewed past medical history, surgical history, family history, and social history below.     Social history:  -Diet: Tries to eat a well balanced diet, variety of different proteins (more chicken than beef), good amount of fruits and vegetables, cheese and yogurt for calcium.   -Water: Drinks a good amount of water throughout the day, no soda.    -Exercise: Very active, walks 4-7 miles everyday, at least 10,000 steps daily.   -Sleep: No concerns.   -Daily vitamins: Reviewed.   -Dentist: Does not regularly go, last visit was 3-4 years ago.   -Eye doctor: Annually.   -Smoking: None.   -Alcohol: None.   -LMP: N/A, history of hysterectomy.      Screenings:  -Immunizations: Encouraged RSV and COVID booster from a local pharmacy.   -Lab work: BMP, A1C, TSH/T4.  -Pap smear: N/A, history of hysterectomy.   -Mammogram: Up to date, due 2025.   -Colonoscopy: N/A, had last colonoscopy completed in 2023 per patient.     Social History     Tobacco Use    Smoking status: Former     Current packs/day: 0.00     Average packs/day: 0.5 packs/day for 20.0 years (10.0 ttl pk-yrs)     Types: Cigarettes     Start date:      Quit date:      Years since quittin.3     Passive exposure: Never    Smokeless tobacco: Never   Substance Use Topics    Alcohol use: Yes     Comment: once a month     Reviewed orders with patient. Reviewed health maintenance and updated orders accordingly - Yes    Lab work is in process.     Breast Cancer Screening: Any new diagnosis of family breast, ovarian, or bowel cancer? No     Mammogram Screening - Patient over age 75,  "has elected to continue with screening. Pertinent mammograms are reviewed under the imaging tab.    History of abnormal Pap smear: Status post benign hysterectomy. Health Maintenance and Surgical History updated.    Reviewed and updated as needed this visit by clinical staff   Tobacco  Allergies  Meds  Problems  Med Hx  Surg Hx  Fam Hx        Reviewed and updated as needed this visit by Provider   Tobacco   Meds   Med Hx  Surg Hx  Fam Hx          Review of Systems  CONSTITUTIONAL: NEGATIVE for fever, chills, change in weight  INTEGUMENTARY/SKIN: NEGATIVE for worrisome rashes, moles or lesions  EYES: NEGATIVE for vision changes or irritation  ENT/MOUTH: NEGATIVE for ear, mouth and throat problems  RESP: NEGATIVE for significant cough or SOB  BREAST: NEGATIVE for masses, tenderness or discharge  CV: NEGATIVE for chest pain, palpitations or peripheral edema  GI: NEGATIVE for nausea, abdominal pain, heartburn, or change in bowel habits  : NEGATIVE for frequency, dysuria, or hematuria  MUSCULOSKELETAL: NEGATIVE for significant arthralgias or myalgia  NEURO: NEGATIVE for weakness, dizziness or paresthesias  ENDOCRINE: NEGATIVE for temperature intolerance, skin/hair changes  HEME: NEGATIVE for bleeding problems  PSYCHIATRIC: NEGATIVE for changes in mood or affect    OBJECTIVE:   /70 (BP Location: Right arm, Patient Position: Chair, Cuff Size: Adult Regular)   Pulse 68   Temp 97.7  F (36.5  C) (Temporal)   Resp 20   Ht 1.575 m (5' 2\")   Wt 56.7 kg (125 lb)   BMI 22.86 kg/m     Estimated body mass index is 22.86 kg/m  as calculated from the following:    Height as of this encounter: 1.575 m (5' 2\").    Weight as of this encounter: 56.7 kg (125 lb).    Physical Exam  GENERAL: alert and no distress  EYES: Eyes grossly normal to inspection, PERRL and conjunctivae and sclerae normal  HENT: ear canals and TM's normal, nose and mouth without ulcers or lesions  NECK: no adenopathy, no asymmetry, masses, or " scars  RESP: lungs clear to auscultation - no rales, rhonchi or wheezes  BREAST: declines, no concerns  CV: regular rate and rhythm, normal S1 S2, no S3 or S4, no murmur, click or rub, no peripheral edema  ABDOMEN: soft, nontender, no hepatosplenomegaly, no masses and bowel sounds normal   (female): declines, no concerns  MS: no gross musculoskeletal defects noted, no edema  SKIN: no suspicious lesions or rashes  NEURO: Normal strength and tone, mentation intact and speech normal  PSYCH: mentation appears normal, affect normal/bright    Lab work pending.     ASSESSMENT/PLAN:     Encounter for Medicare annual wellness exam  - Completed, see note below.     Well woman exam without gynecological exam  - Sapna is doing well today. Encouraged healthy eating habits, daily exercise, reviewed screenings and immunizations. Lab work completed for diabetes and thyroid screening, will update patient with results.   - VENOUS COLLECTION  - HEMOGLOBIN A1C ([facility])  - TSH WITH FREE T4 REFLEX ([facility])  - Basic Metabolic Panel ([facility])    Mixed hyperlipidemia  - Follows with cardiology (Dr. Bianchi). Patient is due for yearly cardiology visit in August 2024 and will have fasting cholesterol labs completed at that time.     Elevated coronary artery calcium score  - See above.     Osteopenia of left hip  - Dexa scan ordered. Encouraged daily calcium, vitamin D, and weight bearing exercises.   - Radiology Referral  - DX Bone Density    Patient has been advised of split billing requirements and indicates understanding: Yes    Counseling  Reviewed preventive health counseling, as reflected in patient instructions       Regular exercise       Healthy diet/nutrition       Vision screening       Alcohol Use       Osteoporosis prevention/bone health       Colorectal Cancer Screening       Advance Care Planning    She reports that she quit smoking about 34 years ago. Her smoking use included cigarettes. She started smoking about 49 years ago. She has  a 10 pack-year smoking history. She has never been exposed to tobacco smoke. She has never used smokeless tobacco.            Signed Electronically by: Annika Buckley PA-C

## 2024-04-13 LAB — TSH SERPL-ACNC: 2.1 MIU/L (ref 0.4–4.5)

## 2024-04-15 PROBLEM — R73.03 PREDIABETES: Status: ACTIVE | Noted: 2024-04-15

## 2024-04-22 PROBLEM — M81.0 AGE-RELATED OSTEOPOROSIS WITHOUT CURRENT PATHOLOGICAL FRACTURE: Status: ACTIVE | Noted: 2024-04-22

## 2024-06-17 ENCOUNTER — HOSPITAL ENCOUNTER (EMERGENCY)
Facility: CLINIC | Age: 79
Discharge: HOME | End: 2024-06-18
Attending: EMERGENCY MEDICINE | Admitting: EMERGENCY MEDICINE
Payer: COMMERCIAL

## 2024-06-17 ENCOUNTER — APPOINTMENT (OUTPATIENT)
Dept: CT IMAGING | Facility: CLINIC | Age: 79
End: 2024-06-17
Attending: EMERGENCY MEDICINE
Payer: COMMERCIAL

## 2024-06-17 DIAGNOSIS — N20.1 RIGHT URETERAL STONE: Primary | ICD-10-CM

## 2024-06-17 DIAGNOSIS — K57.32 SIGMOID DIVERTICULITIS: ICD-10-CM

## 2024-06-17 LAB
ALBUMIN SERPL BCG-MCNC: 4.2 G/DL (ref 3.5–5.2)
ALBUMIN UR-MCNC: 30 MG/DL
ALP SERPL-CCNC: 78 U/L (ref 40–150)
ALT SERPL W P-5'-P-CCNC: 21 U/L (ref 0–50)
ANION GAP SERPL CALCULATED.3IONS-SCNC: 11 MMOL/L (ref 7–15)
APPEARANCE UR: CLEAR
AST SERPL W P-5'-P-CCNC: 25 U/L (ref 0–45)
BASOPHILS # BLD AUTO: 0.1 10E3/UL (ref 0–0.2)
BASOPHILS NFR BLD AUTO: 0 %
BILIRUB SERPL-MCNC: 0.4 MG/DL
BILIRUB UR QL STRIP: NEGATIVE
BUN SERPL-MCNC: 14.3 MG/DL (ref 8–23)
CALCIUM SERPL-MCNC: 9.5 MG/DL (ref 8.8–10.2)
CHLORIDE SERPL-SCNC: 101 MMOL/L (ref 98–107)
COLOR UR AUTO: ABNORMAL
CREAT SERPL-MCNC: 0.93 MG/DL (ref 0.51–0.95)
DEPRECATED HCO3 PLAS-SCNC: 27 MMOL/L (ref 22–29)
EGFRCR SERPLBLD CKD-EPI 2021: 62 ML/MIN/1.73M2
EOSINOPHIL # BLD AUTO: 0.2 10E3/UL (ref 0–0.7)
EOSINOPHIL NFR BLD AUTO: 2 %
ERYTHROCYTE [DISTWIDTH] IN BLOOD BY AUTOMATED COUNT: 13.7 % (ref 10–15)
GLUCOSE SERPL-MCNC: 145 MG/DL (ref 70–99)
GLUCOSE UR STRIP-MCNC: NEGATIVE MG/DL
HCT VFR BLD AUTO: 43.6 % (ref 35–47)
HGB BLD-MCNC: 14.2 G/DL (ref 11.7–15.7)
HGB UR QL STRIP: ABNORMAL
IMM GRANULOCYTES # BLD: 0 10E3/UL
IMM GRANULOCYTES NFR BLD: 0 %
KETONES UR STRIP-MCNC: NEGATIVE MG/DL
LEUKOCYTE ESTERASE UR QL STRIP: ABNORMAL
LIPASE SERPL-CCNC: 47 U/L (ref 13–60)
LYMPHOCYTES # BLD AUTO: 2.3 10E3/UL (ref 0.8–5.3)
LYMPHOCYTES NFR BLD AUTO: 20 %
MCH RBC QN AUTO: 29.6 PG (ref 26.5–33)
MCHC RBC AUTO-ENTMCNC: 32.6 G/DL (ref 31.5–36.5)
MCV RBC AUTO: 91 FL (ref 78–100)
MONOCYTES # BLD AUTO: 0.7 10E3/UL (ref 0–1.3)
MONOCYTES NFR BLD AUTO: 6 %
MUCOUS THREADS #/AREA URNS LPF: PRESENT /LPF
NEUTROPHILS # BLD AUTO: 8.2 10E3/UL (ref 1.6–8.3)
NEUTROPHILS NFR BLD AUTO: 71 %
NITRATE UR QL: NEGATIVE
NRBC # BLD AUTO: 0 10E3/UL
NRBC BLD AUTO-RTO: 0 /100
PH UR STRIP: 6.5 [PH] (ref 5–7)
PLATELET # BLD AUTO: 249 10E3/UL (ref 150–450)
POTASSIUM SERPL-SCNC: 3.5 MMOL/L (ref 3.4–5.3)
PROT SERPL-MCNC: 7.1 G/DL (ref 6.4–8.3)
RBC # BLD AUTO: 4.79 10E6/UL (ref 3.8–5.2)
RBC URINE: 90 /HPF
SODIUM SERPL-SCNC: 139 MMOL/L (ref 135–145)
SP GR UR STRIP: 1.03 (ref 1–1.03)
SQUAMOUS EPITHELIAL: 1 /HPF
UROBILINOGEN UR STRIP-MCNC: NORMAL MG/DL
WBC # BLD AUTO: 11.5 10E3/UL (ref 4–11)
WBC URINE: 63 /HPF

## 2024-06-17 PROCEDURE — 87086 URINE CULTURE/COLONY COUNT: CPT | Performed by: EMERGENCY MEDICINE

## 2024-06-17 PROCEDURE — 99285 EMERGENCY DEPT VISIT HI MDM: CPT | Mod: 25

## 2024-06-17 PROCEDURE — 80053 COMPREHEN METABOLIC PANEL: CPT | Performed by: EMERGENCY MEDICINE

## 2024-06-17 PROCEDURE — 81001 URINALYSIS AUTO W/SCOPE: CPT | Performed by: EMERGENCY MEDICINE

## 2024-06-17 PROCEDURE — 36415 COLL VENOUS BLD VENIPUNCTURE: CPT | Performed by: EMERGENCY MEDICINE

## 2024-06-17 PROCEDURE — 85004 AUTOMATED DIFF WBC COUNT: CPT | Performed by: EMERGENCY MEDICINE

## 2024-06-17 PROCEDURE — 74177 CT ABD & PELVIS W/CONTRAST: CPT

## 2024-06-17 PROCEDURE — 250N000011 HC RX IP 250 OP 636: Performed by: EMERGENCY MEDICINE

## 2024-06-17 PROCEDURE — 250N000009 HC RX 250: Performed by: EMERGENCY MEDICINE

## 2024-06-17 PROCEDURE — 83690 ASSAY OF LIPASE: CPT | Performed by: EMERGENCY MEDICINE

## 2024-06-17 RX ORDER — IOPAMIDOL 755 MG/ML
500 INJECTION, SOLUTION INTRAVASCULAR ONCE
Status: COMPLETED | OUTPATIENT
Start: 2024-06-17 | End: 2024-06-17

## 2024-06-17 RX ORDER — HYDROCODONE BITARTRATE AND ACETAMINOPHEN 5; 325 MG/1; MG/1
1 TABLET ORAL ONCE
Status: COMPLETED | OUTPATIENT
Start: 2024-06-17 | End: 2024-06-18

## 2024-06-17 RX ORDER — TAMSULOSIN HYDROCHLORIDE 0.4 MG/1
0.4 CAPSULE ORAL ONCE
Status: COMPLETED | OUTPATIENT
Start: 2024-06-17 | End: 2024-06-18

## 2024-06-17 RX ADMIN — SODIUM CHLORIDE 56 ML: 9 INJECTION, SOLUTION INTRAVENOUS at 23:20

## 2024-06-17 RX ADMIN — IOPAMIDOL 63 ML: 755 INJECTION, SOLUTION INTRAVENOUS at 23:20

## 2024-06-17 ASSESSMENT — ACTIVITIES OF DAILY LIVING (ADL)
ADLS_ACUITY_SCORE: 35
ADLS_ACUITY_SCORE: 35

## 2024-06-17 ASSESSMENT — COLUMBIA-SUICIDE SEVERITY RATING SCALE - C-SSRS
2. HAVE YOU ACTUALLY HAD ANY THOUGHTS OF KILLING YOURSELF IN THE PAST MONTH?: NO
6. HAVE YOU EVER DONE ANYTHING, STARTED TO DO ANYTHING, OR PREPARED TO DO ANYTHING TO END YOUR LIFE?: NO
1. IN THE PAST MONTH, HAVE YOU WISHED YOU WERE DEAD OR WISHED YOU COULD GO TO SLEEP AND NOT WAKE UP?: NO

## 2024-06-18 VITALS
HEIGHT: 62 IN | TEMPERATURE: 96.9 F | SYSTOLIC BLOOD PRESSURE: 148 MMHG | WEIGHT: 125.22 LBS | BODY MASS INDEX: 23.04 KG/M2 | RESPIRATION RATE: 18 BRPM | HEART RATE: 74 BPM | OXYGEN SATURATION: 100 % | DIASTOLIC BLOOD PRESSURE: 71 MMHG

## 2024-06-18 PROCEDURE — 250N000013 HC RX MED GY IP 250 OP 250 PS 637: Performed by: EMERGENCY MEDICINE

## 2024-06-18 RX ORDER — HYDROCODONE BITARTRATE AND ACETAMINOPHEN 5; 325 MG/1; MG/1
1 TABLET ORAL EVERY 6 HOURS PRN
Qty: 10 TABLET | Refills: 0 | Status: SHIPPED | OUTPATIENT
Start: 2024-06-18 | End: 2024-06-21

## 2024-06-18 RX ORDER — TAMSULOSIN HYDROCHLORIDE 0.4 MG/1
0.4 CAPSULE ORAL DAILY
Qty: 10 CAPSULE | Refills: 0 | Status: SHIPPED | OUTPATIENT
Start: 2024-06-18 | End: 2024-06-24

## 2024-06-18 RX ORDER — ONDANSETRON 4 MG/1
4 TABLET, ORALLY DISINTEGRATING ORAL EVERY 8 HOURS PRN
Qty: 10 TABLET | Refills: 0 | Status: SHIPPED | OUTPATIENT
Start: 2024-06-18

## 2024-06-18 RX ADMIN — TAMSULOSIN HYDROCHLORIDE 0.4 MG: 0.4 CAPSULE ORAL at 00:06

## 2024-06-18 NOTE — ED TRIAGE NOTES
"Arrived from home with . C/O right flank pain radiating into abd for the past 4 hours. Feelings of nausea as well with no emesis. Pt states \"feels like a kidney stone\" as she has had them in the past. Denies having any fevers. Denies having any pain with urination.     Triage Assessment (Adult)       Row Name 06/17/24 3221          Triage Assessment    Airway WDL WDL        Respiratory WDL    Respiratory WDL WDL        Skin Circulation/Temperature WDL    Skin Circulation/Temperature WDL WDL        Cardiac WDL    Cardiac WDL WDL        Peripheral/Neurovascular WDL    Peripheral Neurovascular WDL WDL        Cognitive/Neuro/Behavioral WDL    Cognitive/Neuro/Behavioral WDL WDL                     "

## 2024-06-18 NOTE — ED PROVIDER NOTES
"  Emergency Department Note      History of Present Illness     Chief Complaint  Flank Pain, Abdominal Pain, and Nausea    HPI  Sapna Ibrahim is a 79 year old female with a history of kidney stones and hyperlipidemia who presents to the emergency department for evaluation of right flank pain, abdominal pain, and nausea. The patient reports the pain starting a couple hours prior to arrival, and feeling similar to her past kidney stones and radiating down her right sided abdomen into her right groin. She states that the pain was an 8-9/10 on severity and was relieved to a 5/10 now. She endorses nausea, but no vomiting. She denies any fever, chills, chest pain, trouble breathing, rhinorrhea, black/bloody stool, diarrhea, urinary symptoms, or vomiting.     Independent Historian  None    Review of External Notes  I reviewed the office visit note from 04/12/24    Past Medical History   Medical History and Problem List  hyperlipidemia  Calculus of kidney     Medications  Crestor     Surgical History   Cataract   Colonoscopy   Combined cystoscopy, insert stent ureters   Extracorporeal shock wave lithotripsy x2  Laparoscopy surgical cholecystectomy   Removal of tonsils   Pap smear   Release carpal tunnel   Vascular surgery   Nonspecific procedure   Vaginal hysterectomy   Colonoscopy thru stoma, diagnostic   Physical Exam   Patient Vitals for the past 24 hrs:   BP Temp Temp src Pulse Resp SpO2 Height Weight   06/18/24 0020 (!) 148/71 -- -- 74 18 100 % -- --   06/17/24 2148 (!) 185/84 -- -- -- -- -- -- --   06/17/24 2146 -- 96.9  F (36.1  C) Temporal 79 20 98 % 1.575 m (5' 2\") 56.8 kg (125 lb 3.5 oz)     Physical Exam  Constitutional:       General: Not in acute distress.        Appearance: Normal appearance.   HENT:      Head: Normocephalic and atraumatic.   Eyes:      Extraocular Movements: Extraocular movements intact.      Conjunctiva/sclera: Conjunctivae normal.   Cardiovascular:      Rate and Rhythm: Normal rate and " regular rhythm.   Pulmonary:      Effort: Pulmonary effort is normal. No respiratory distress.      Breath sounds: Normal breath sounds.   Abdominal:      General: Abdomen is flat. There is no distension.      Palpations: Abdomen is soft.      Tenderness: There is right flank and right-sided abdominal tenderness to palpation.   Musculoskeletal:      Cervical back: Normal range of motion. No rigidity.      Right lower leg: No edema.      Left lower leg: No edema.   Skin:     General: Skin is warm and dry.   Neurological:      General: No focal deficit present.      Mental Status: Alert and oriented to person, place, and time.   Psychiatric:         Mood and Affect: Mood normal.         Behavior: Behavior normal.  Diagnostics   Lab Results   Labs Ordered and Resulted from Time of ED Arrival to Time of ED Departure   COMPREHENSIVE METABOLIC PANEL - Abnormal       Result Value    Sodium 139      Potassium 3.5      Carbon Dioxide (CO2) 27      Anion Gap 11      Urea Nitrogen 14.3      Creatinine 0.93      GFR Estimate 62      Calcium 9.5      Chloride 101      Glucose 145 (*)     Alkaline Phosphatase 78      AST 25      ALT 21      Protein Total 7.1      Albumin 4.2      Bilirubin Total 0.4     ROUTINE UA WITH MICROSCOPIC REFLEX TO CULTURE - Abnormal    Color Urine Light Yellow      Appearance Urine Clear      Glucose Urine Negative      Bilirubin Urine Negative      Ketones Urine Negative      Specific Gravity Urine 1.027      Blood Urine Moderate (*)     pH Urine 6.5      Protein Albumin Urine 30 (*)     Urobilinogen Urine Normal      Nitrite Urine Negative      Leukocyte Esterase Urine Large (*)     Mucus Urine Present (*)     RBC Urine 90 (*)     WBC Urine 63 (*)     Squamous Epithelials Urine 1     CBC WITH PLATELETS AND DIFFERENTIAL - Abnormal    WBC Count 11.5 (*)     RBC Count 4.79      Hemoglobin 14.2      Hematocrit 43.6      MCV 91      MCH 29.6      MCHC 32.6      RDW 13.7      Platelet Count 249      %  Neutrophils 71      % Lymphocytes 20      % Monocytes 6      % Eosinophils 2      % Basophils 0      % Immature Granulocytes 0      NRBCs per 100 WBC 0      Absolute Neutrophils 8.2      Absolute Lymphocytes 2.3      Absolute Monocytes 0.7      Absolute Eosinophils 0.2      Absolute Basophils 0.1      Absolute Immature Granulocytes 0.0      Absolute NRBCs 0.0     LIPASE - Normal    Lipase 47     URINE CULTURE       Imaging  CT Abdomen Pelvis w Contrast   Final Result   IMPRESSION:    1.  0.6 cm proximal right ureteral calculus, resulting in mild obstruction. There is also a tiny nonobstructing right renal calculus.   2.  Apparent slight haziness is present within the fat about the mid to distal sigmoid colon, in the region of colonic diverticula. This is equivocal for mild diverticulitis. Recommend clinical correlation.          Independent Interpretation  None  ED Course    Medications Administered  Medications   iopamidol (ISOVUE-370) solution 500 mL (63 mLs Intravenous $Given 6/17/24 2320)   CT scan flush (56 mLs Intravenous $Given 6/17/24 2320)   HYDROcodone-acetaminophen (NORCO) 5-325 MG per tablet 1 tablet (1 tablet Oral Not Given 6/18/24 0013)   tamsulosin (FLOMAX) capsule 0.4 mg (0.4 mg Oral $Given 6/18/24 0006)       Procedures  Procedures     Discussion of Management  See ED course    Social Determinants of Health adding to complexity of care  None    ED Course  ED Course as of 06/18/24 0329   Mon Jun 17, 2024   2215 I obtained history and examined the patient as noted above     Tue Jun 18, 2024   0006 Patient updated on image findings including equivocal findings for diverticulitis.  Will discharge patient with Augmentin, Flomax, Norco, Zofran, and urine strainer.  Patient to follow-up with PCP/urology. Discussed return precautions.  Answered all questions.  Patient voiced understanding and agreement with plan.     Medical Decision Making / Diagnosis   CMS Diagnoses: None    MIPS     None    PHILIP HOWELL  Patrice is a 79 year old female as described above presents to the emergency department with right flank pain with radiation to right-sided abdomen.  Patient hemodynamically stable at time of evaluation.  Afebrile.  Pain improving since arrival to the ER.  Differential diagnosis considered includes, but not limited to, pyelonephritis, nephrolithiasis/ureteral colic, infectious colitis, acute cholecystitis, pancreatitis, right-sided diverticulitis, bowel obstruction, and abdominal aortic aneurysm/dissection.  Will obtain CT abdomen pelvis with contrast for evaluation of above discussed differentials.  Abdominal pain lab work.  Discussed care plan with patient who voiced understanding and agreement with plan.  Answered all questions.  Additional workup and orders as listed in chart.    Please refer to ED course above as part of continuation of MDM for details on the patient's treatment course and any changes or updates in care plan beyond my initial evaluation and MDM creation.    Disposition  The patient was discharged.     ICD-10 Codes:    ICD-10-CM    1. Right ureteral stone  N20.1       2. Sigmoid diverticulitis  K57.32            Discharge Medications  Discharge Medication List as of 6/18/2024 12:10 AM        START taking these medications    Details   amoxicillin-clavulanate (AUGMENTIN) 875-125 MG tablet Take 1 tablet by mouth 2 times daily for 7 days, Disp-14 tablet, R-0, E-Prescribe      HYDROcodone-acetaminophen (NORCO) 5-325 MG tablet Take 1 tablet by mouth every 6 hours as needed for severe pain, Disp-10 tablet, R-0, E-Prescribe      ondansetron (ZOFRAN ODT) 4 MG ODT tab Take 1 tablet (4 mg) by mouth every 8 hours as needed, Disp-10 tablet, R-0, E-Prescribe      tamsulosin (FLOMAX) 0.4 MG capsule Take 1 capsule (0.4 mg) by mouth daily for 10 doses, Disp-10 capsule, R-0, E-Prescribe             Scribe Disclosure:  Joanna RICCI, senia serving as a scribe at 10:34 PM on 6/17/2024 to document services  personally performed by Misael Guerrero DO based on my observations and the provider's statements to me.     Scribe Disclosure:  I, Cathi Linn, am serving as a scribe  at 10:59 PM on 6/17/2024 to document services personally performed by Misael Guerrero DO based on my observations and the provider's statements to me.         Misael Guerrero DO  06/18/24 0329

## 2024-06-19 LAB — BACTERIA UR CULT: NORMAL

## 2024-06-24 ENCOUNTER — OFFICE VISIT (OUTPATIENT)
Dept: UROLOGY | Facility: CLINIC | Age: 79
End: 2024-06-24
Payer: COMMERCIAL

## 2024-06-24 VITALS
WEIGHT: 125 LBS | BODY MASS INDEX: 23 KG/M2 | SYSTOLIC BLOOD PRESSURE: 122 MMHG | HEART RATE: 73 BPM | DIASTOLIC BLOOD PRESSURE: 79 MMHG | HEIGHT: 62 IN

## 2024-06-24 DIAGNOSIS — N20.1 OBSTRUCTION OF RIGHT URETEROPELVIC JUNCTION (UPJ) DUE TO STONE: Primary | ICD-10-CM

## 2024-06-24 PROCEDURE — 99204 OFFICE O/P NEW MOD 45 MIN: CPT | Performed by: STUDENT IN AN ORGANIZED HEALTH CARE EDUCATION/TRAINING PROGRAM

## 2024-06-24 RX ORDER — TAMSULOSIN HYDROCHLORIDE 0.4 MG/1
0.4 CAPSULE ORAL EVERY EVENING
Qty: 30 CAPSULE | Refills: 0 | Status: SHIPPED | OUTPATIENT
Start: 2024-06-24

## 2024-06-24 ASSESSMENT — PAIN SCALES - GENERAL: PAINLEVEL: NO PAIN (0)

## 2024-06-24 NOTE — PATIENT INSTRUCTIONS
78 yo F with prior history of stones including right ESWL 2013 and follow up ESWL 2014 ([facility]) now with right renal colic and found to have right ~ 7 mm right ureteropelvic junction stone    Offered management with either     Right extracorporeal shockwave lithotripsy with right ureteral stent placement. Risks including bleeding, incomplete stone clearance, Steinstrasse, need for secondary procedure, stent pain discussed    vs    Cystoscopy, right ureteroscopy with laser lithotripsy, right ureteroscopy with stone basketing, right retrograde pyelogram, right ureteral stent placement. Risks including need for secondary procedure, infection, ureteral injury, incomplete stone clearance, stent pain and irritation were discussed

## 2024-06-24 NOTE — PROGRESS NOTES
[facility]  Main Office: [address]  [address]  [place] [address]       CHIEF COMPLAINT:  Right ureteropelvic stone    HISTORY:   80 yo F with prior history of stones including right ESWL 2013 and follow up ESWL 2014 (Claire) now with right renal colic and found to have right ureteropelvic junction stone    Had two large right renal stones 1.3 cm and 0.8 cm and underwent ESWL with stent 12/10/2013 with residual stone burden s/p repeat ESWL 5/13/2014. Has not had any recent urology followup    Spontaneously passed a stone in October 2021. She does not recall the fact that a CT at that time showed a 3 mm right UPJ stone (I can't review the images, at [facility])    Had right flank last week went to ER and found to have right UPJ stone. Sent home on medical expulsive therapy and empiric abx, final urine culture mixed gloria. Currently no significant pain.       PAST MEDICAL HISTORY:   Past Medical History:   Diagnosis Date    Allergic rhinitis due to pollen     Elevated coronary artery calcium score     Mixed dyslipidemia        PAST SURGICAL HISTORY:   Past Surgical History:   Procedure Laterality Date    CATARACT IOL, RT/LT Bilateral     COLONOSCOPY  09/01/2014    hemorrhoids;     COMBINED CYSTOSCOPY, INSERT STENT URETER(S)  12/10/2013    Procedure: COMBINED CYSTOSCOPY, INSERT STENT URETER(S);;  Surgeon: Milton Rangel MD;  Location:  OR    EXTRACORPOREAL SHOCK WAVE LITHOTRIPSY (ESWL)  12/10/2013    Procedure: EXTRACORPOREAL SHOCK WAVE LITHOTRIPSY (ESWL);  RIGHT EXTRACORPOREAL SHOCK WAVE LITHOTRIPSY, CYSTOSCOPY WITH RIGHT STENT PLACEMENT, RIGHT RETROGRADE;  Surgeon: Milton Rangel MD;  Location:  OR    EXTRACORPOREAL SHOCK WAVE LITHOTRIPSY (ESWL)  05/13/2014    Procedure: EXTRACORPOREAL SHOCK WAVE LITHOTRIPSY (ESWL);  Surgeon: Milton Rangel MD;  Location:  OR     LAPAROSCOPY, SURGICAL; CHOLECYSTECTOMY  01/01/1996    open procedure    HC REMOVAL OF TONSILS,12+ Y/O      age 21    HCL PAP SMEAR   1999    normal    RELEASE CARPAL TUNNEL  2015    VASCULAR SURGERY  1994    microvascular decompression of trigeminal nerve (MVD)    [facility] NONSPECIFIC PROCEDURE  1994    microvascular decompression of the right trigeminal nerve    [facility] VAGINAL HYSTERECTOMY  1983    menorhagia; both ovaries remain    ZZHC COLONOSCOPY THRU STOMA, DIAGNOSTIC  2003    normal; diverticulosis       FAMILY HISTORY:   Family History   Adopted: Yes   Problem Relation Age of Onset    Multiple Sclerosis Daughter     Colon Cancer No family hx of     Breast Cancer No family hx of        SOCIAL HISTORY:   Social History     Tobacco Use    Smoking status: Former     Current packs/day: 0.00     Average packs/day: 0.5 packs/day for 20.0 years (10.0 ttl pk-yrs)     Types: Cigarettes     Start date:      Quit date:      Years since quittin.5     Passive exposure: Never    Smokeless tobacco: Never   Substance Use Topics    Alcohol use: Yes     Comment: once a month          Allergies   Allergen Reactions    Meperidine Hcl      sick to stomache    Percodan [Aspirin]          Current Outpatient Medications:     amoxicillin-clavulanate (AUGMENTIN) 875-125 MG tablet, Take 1 tablet by mouth 2 times daily for 7 days, Disp: 14 tablet, Rfl: 0    co-enzyme Q-10 100 MG CAPS capsule, Take 100 mg by mouth daily, Disp: , Rfl:     Multiple Vitamins-Minerals (PRESERVISION AREDS) TABS, Take by mouth daily, Disp: , Rfl:     rosuvastatin (CRESTOR) 40 MG tablet, Take 1 tablet (40 mg) by mouth daily, Disp: 100 tablet, Rfl: 6    tamsulosin (FLOMAX) 0.4 MG capsule, Take 1 capsule (0.4 mg) by mouth daily for 10 doses, Disp: 10 capsule, Rfl: 0    Turmeric 400 MG CAPS, Take 1 capsule by mouth daily, Disp: , Rfl:     UNABLE TO FIND, Take by mouth daily Elderberry Syrup, Disp: , Rfl:     Vitamin D3 (CHOLECALCIFEROL) 125 MCG (5000 UT) tablet, Take by mouth daily, Disp: , Rfl:     ondansetron (ZOFRAN ODT) 4 MG ODT tab, Take 1 tablet (4  "mg) by mouth every 8 hours as needed, Disp: 10 tablet, Rfl: 0    Review Of Systems:  Skin: No rash, pruritis, or skin pigmentation  Eyes: No changes in vision  Ears/Nose/Throat: No changes in hearing, no nosebleeds  Respiratory: No shortness of breath, dyspnea on exertion, cough, or hemoptysis  Cardiovascular: No chest pain or palpitations  Gastrointestinal: No diarrhea or constipation. No abdominal pain. No hematochezia  Genitourinary: see HPI  Musculoskeletal: No pain or swelling of joints, normal range of motion  Neurologic: No weakness or tremors  Psychiatric: No recent changes in memory or mood  Hematologic/Lymphatic/Immunologic: No easy bruising or enlarged lymph nodes  Endocrine: No weight gain or loss      PHYSICAL EXAM:    /79   Pulse 73   Ht 1.575 m (5' 2\")   Wt 56.7 kg (125 lb)   BMI 22.86 kg/m    General appearance: In NAD, conversant  HEENT: Normocephalic and atraumatic, anicteric sclera  Cardiovascular: Not examined  Respiratory: normal, non-labored breathing  Gastrointestinal: negative, Abdomen soft, non-tender, and non-distended.   Musculoskeletal: Not Examined  Peripheral Vascular/extremity: No peripheral edema  Skin: Normal temperature, turgor, and texture. No rash  Psychiatric: Appropriate affect, alert and oriented to person, place, and time      UA RESULTS:  Recent Labs   Lab Test 06/17/24  2333   COLOR Light Yellow   APPEARANCE Clear   URINEGLC Negative   URINEBILI Negative   URINEKETONE Negative   SG 1.027   UBLD Moderate*   URINEPH 6.5   PROTEIN 30*   NITRITE Negative   LEUKEST Large*   RBCU 90*   WBCU 63*       Bladder Scan:     Other Labs:      Imaging Studies: ct 6/17/2024    IMPRESSION:   1.  0.6 cm proximal right ureteral calculus, resulting in mild obstruction. There is also a tiny nonobstructing right renal calculus.  2.  Apparent slight haziness is present within the fat about the mid to distal sigmoid colon, in the region of colonic diverticula. This is equivocal for mild " diverticulitis. Recommend clinical correlation.      Reviewed and interpreted images personally. 7 mm right UPJ stone over 1000 hu in the core        Small right lower pole calcification    CLINICAL IMPRESSION:   78 yo F with prior history of stones including right ESWL 2013 and follow up ESWL 2014 (Claire) now with right renal colic and found to have right ~ 7 mm right ureteropelvic junction stone    Offered management with either     Right extracorporeal shockwave lithotripsy with right ureteral stent placement. Risks including bleeding, incomplete stone clearance, Steinstrasse, need for secondary procedure, stent pain discussed    vs    Cystoscopy, right ureteroscopy with laser lithotripsy, right ureteroscopy with stone basketing, right retrograde pyelogram, right ureteral stent placement. Risks including need for secondary procedure, infection, ureteral injury, incomplete stone clearance, stent pain and irritation were discussed      PLAN:   Patient wants to think about the surgery and will contact us when she is ready to make a decision  Recommend continue tamsulosin 0.4 mg daily (refilled, rx drug management) in the interim    Preston Izquierdo MD   [facility]  [phone] clinic phone

## 2024-06-24 NOTE — NURSING NOTE
Chief Complaint   Patient presents with    Kidney Stone Related     Has not had any pain since emergency room visit.  Denies gross heamaturia or dysuria    Kerline Valentin, EMT

## 2024-10-08 DIAGNOSIS — E78.2 MIXED HYPERLIPIDEMIA: Primary | ICD-10-CM

## 2024-10-08 DIAGNOSIS — I25.10 CORONARY ARTERY DISEASE INVOLVING NATIVE CORONARY ARTERY OF NATIVE HEART WITHOUT ANGINA PECTORIS: ICD-10-CM

## 2024-10-15 ENCOUNTER — HOSPITAL ENCOUNTER (OUTPATIENT)
Dept: CARDIOLOGY | Facility: CLINIC | Age: 79
Discharge: HOME | End: 2024-10-15
Attending: INTERNAL MEDICINE | Admitting: INTERNAL MEDICINE
Payer: COMMERCIAL

## 2024-10-15 ENCOUNTER — LAB (OUTPATIENT)
Dept: LAB | Facility: CLINIC | Age: 79
End: 2024-10-15
Attending: INTERNAL MEDICINE
Payer: COMMERCIAL

## 2024-10-15 DIAGNOSIS — R93.1 ELEVATED CORONARY ARTERY CALCIUM SCORE: ICD-10-CM

## 2024-10-15 DIAGNOSIS — I25.10 CORONARY ARTERY DISEASE INVOLVING NATIVE CORONARY ARTERY OF NATIVE HEART WITHOUT ANGINA PECTORIS: ICD-10-CM

## 2024-10-15 DIAGNOSIS — E78.2 MIXED HYPERLIPIDEMIA: ICD-10-CM

## 2024-10-15 LAB
CHOLEST SERPL-MCNC: 150 MG/DL
FASTING STATUS PATIENT QL REPORTED: YES
HDLC SERPL-MCNC: 66 MG/DL
LDLC SERPL CALC-MCNC: 53 MG/DL
LVEF ECHO: NORMAL
NONHDLC SERPL-MCNC: 84 MG/DL
TRIGL SERPL-MCNC: 155 MG/DL

## 2024-10-15 PROCEDURE — 93306 TTE W/DOPPLER COMPLETE: CPT

## 2024-10-15 PROCEDURE — 80061 LIPID PANEL: CPT

## 2024-10-15 PROCEDURE — 93306 TTE W/DOPPLER COMPLETE: CPT | Mod: 26 | Performed by: INTERNAL MEDICINE

## 2024-10-15 PROCEDURE — 36415 COLL VENOUS BLD VENIPUNCTURE: CPT

## 2024-11-07 ENCOUNTER — OFFICE VISIT (OUTPATIENT)
Dept: CARDIOLOGY | Facility: CLINIC | Age: 79
End: 2024-11-07
Payer: COMMERCIAL

## 2024-11-07 VITALS
BODY MASS INDEX: 20 KG/M2 | DIASTOLIC BLOOD PRESSURE: 62 MMHG | WEIGHT: 108.7 LBS | HEART RATE: 74 BPM | OXYGEN SATURATION: 97 % | HEIGHT: 62 IN | SYSTOLIC BLOOD PRESSURE: 116 MMHG

## 2024-11-07 DIAGNOSIS — R93.1 ELEVATED CORONARY ARTERY CALCIUM SCORE: Primary | ICD-10-CM

## 2024-11-07 DIAGNOSIS — E78.5 HYPERLIPIDEMIA LDL GOAL <70: ICD-10-CM

## 2024-11-07 PROCEDURE — 99214 OFFICE O/P EST MOD 30 MIN: CPT | Performed by: INTERNAL MEDICINE

## 2024-11-07 RX ORDER — ASPIRIN 81 MG/1
81 TABLET, CHEWABLE ORAL DAILY
COMMUNITY
Start: 2024-11-07

## 2024-11-07 RX ORDER — ROSUVASTATIN CALCIUM 40 MG/1
40 TABLET, COATED ORAL DAILY
Qty: 100 TABLET | Refills: 6 | Status: SHIPPED | OUTPATIENT
Start: 2024-11-07

## 2024-11-07 NOTE — PROGRESS NOTES
SERVICE DATE: November 7, 2024      DIAGNOSES/ASSESSMENT:    Hyperlipidemia with LDL at target at 53.  Continue rosuvastatin 40 mg daily and heart healthy lifestyle.  Asymptomatic elevated coronary calcium score (1564, 96th percentile of risk) with normal echocardiogram.    PLAN:  Continue rosuvastatin 40 mg daily. Refill for 1 year.  Future refills by PCP.  Discontinue CoQ10 supplementation after finishing current supply.  Start aspirin 81 mg daily for primary prevention.   Discharge from cardiology; transition to PCP for annual follow-up and management.  Contact clinic if new symptoms arise.      Melvin Bianchi MD  Cardiology        REASON FOR VISIT:  Follow-up of hyperlipidemia and elevated coronary calcium score.    HISTORY OF PRESENT ILLNESS:  Sapna Ibrahim is a delightful 79 year old female, with a history of hyperlipidemia and asymptomatic elevated coronary calcium score of 1564 (96th percentile risk). She has a remote history of tobacco use (quit 40 years ago), BMI 20. No family history of heart disease is reported, as she was adopted.    She remains physically active, taking at least 10,000 steps daily and using a home elliptical machine. She denies any current symptoms related to her cardiovascular health.    She is currently on rosuvastatin 40mg daily and denies any muscle aches.  Labs show total cholesterol 150, HDL 66, LDL 53, triglycerides 155 (improved from 209), HbA1c 5.8%, creatinine 0.93, Hgb 14.2, TSH normal, liver panel normal.    /62 mmHg, HR: 74, BMI: 20, regular heart sounds, no murmurs, no carotid bruit.    Echo (10/15/2024) showed LVF 60%, normal RV function, no significant valve findings.    EKG shows NSR at 80 bpm, normal intervals, no ischemia or infarct.    CT Calcium Scan: Coronary calcium score 1564 (96th percentile risk).      Established patient.   Moderate complexity medical decision making.      This note was completed in part using dictation via the Dragon voice  "recognition software. Some word and grammatical errors may occur and must be interpreted in the appropriate clinical context. If there are any questions pertaining to this issue, please contact me for further clarification.           Vitals: /62 (BP Location: Right arm, Patient Position: Sitting, Cuff Size: Adult Regular)   Pulse 74   Ht 1.575 m (5' 2\")   Wt 49.3 kg (108 lb 11.2 oz)   SpO2 97%   BMI 19.88 kg/m        CURRENT MEDICATIONS:  Current Outpatient Medications   Medication Sig Dispense Refill    aspirin (ASA) 81 MG chewable tablet Take 1 tablet (81 mg) by mouth daily.      Multiple Vitamins-Minerals (PRESERVISION AREDS) TABS Take by mouth daily      rosuvastatin (CRESTOR) 40 MG tablet Take 1 tablet (40 mg) by mouth daily. 100 tablet 6    Turmeric 400 MG CAPS Take 1 capsule by mouth daily      UNABLE TO FIND Take by mouth daily Elderberry Syrup      Vitamin D3 (CHOLECALCIFEROL) 125 MCG (5000 UT) tablet Take by mouth daily           ALLERGIES:  Allergies   Allergen Reactions    Meperidine Hcl      sick to stomache    Percodan [Aspirin]              "

## 2024-11-07 NOTE — LETTER
11/7/2024    Verena Freedman PA-C  [address], [address]  [facility] [address]    RE: Sapna Ibrahim       Dear Colleague,     I had the pleasure of seeing Sapna Ibrahim in the [facility].      SERVICE DATE: November 7, 2024      DIAGNOSES/ASSESSMENT:    Hyperlipidemia with LDL at target at 53.  Continue rosuvastatin 40 mg daily and heart healthy lifestyle.  Asymptomatic elevated coronary calcium score (1564, 96th percentile of risk) with normal echocardiogram.    PLAN:  Continue rosuvastatin 40 mg daily. Refill for 1 year.  Future refills by PCP.  Discontinue CoQ10 supplementation after finishing current supply.  Start aspirin 81 mg daily for primary prevention.   Discharge from cardiology; transition to PCP for annual follow-up and management.  Contact clinic if new symptoms arise.      Melvin Bianchi MD  Cardiology        REASON FOR VISIT:  Follow-up of hyperlipidemia and elevated coronary calcium score.    HISTORY OF PRESENT ILLNESS:  Sapna Ibrahim is a delightful 79 year old female, with a history of hyperlipidemia and asymptomatic elevated coronary calcium score of 1564 (96th percentile risk). She has a remote history of tobacco use (quit 40 years ago), BMI 20. No family history of heart disease is reported, as she was adopted.    She remains physically active, taking at least 10,000 steps daily and using a home elliptical machine. She denies any current symptoms related to her cardiovascular health.    She is currently on rosuvastatin 40mg daily and denies any muscle aches.  Labs show total cholesterol 150, HDL 66, LDL 53, triglycerides 155 (improved from 209), HbA1c 5.8%, creatinine 0.93, Hgb 14.2, TSH normal, liver panel normal.    /62 mmHg, HR: 74, BMI: 20, regular heart sounds, no murmurs, no carotid bruit.    Echo (10/15/2024) showed LVF 60%, normal RV function, no significant valve findings.    EKG shows NSR at 80 bpm, normal intervals, no ischemia or  "infarct.    CT Calcium Scan: Coronary calcium score 1564 (96th percentile risk).      Established patient.   Moderate complexity medical decision making.      This note was completed in part using dictation via the Dragon voice recognition software. Some word and grammatical errors may occur and must be interpreted in the appropriate clinical context. If there are any questions pertaining to this issue, please contact me for further clarification.           Vitals: /62 (BP Location: Right arm, Patient Position: Sitting, Cuff Size: Adult Regular)   Pulse 74   Ht 1.575 m (5' 2\")   Wt 49.3 kg (108 lb 11.2 oz)   SpO2 97%   BMI 19.88 kg/m        CURRENT MEDICATIONS:  Current Outpatient Medications   Medication Sig Dispense Refill     aspirin (ASA) 81 MG chewable tablet Take 1 tablet (81 mg) by mouth daily.       Multiple Vitamins-Minerals (PRESERVISION AREDS) TABS Take by mouth daily       rosuvastatin (CRESTOR) 40 MG tablet Take 1 tablet (40 mg) by mouth daily. 100 tablet 6     Turmeric 400 MG CAPS Take 1 capsule by mouth daily       UNABLE TO FIND Take by mouth daily Elderberry Syrup       Vitamin D3 (CHOLECALCIFEROL) 125 MCG (5000 UT) tablet Take by mouth daily           ALLERGIES:  Allergies   Allergen Reactions     Meperidine Hcl      sick to stomache     Percodan [Aspirin]              Thank you for allowing me to participate in the care of your patient.      Sincerely,     Melvin Bianchi MD     [facility]  cc:   Verena Freedman PA-C  [address], [address]  [place] [address]      "

## 2025-01-06 ENCOUNTER — HOSPITAL ENCOUNTER (EMERGENCY)
Facility: CLINIC | Age: 80
Discharge: HOME | End: 2025-01-06
Attending: EMERGENCY MEDICINE
Payer: COMMERCIAL

## 2025-01-06 ENCOUNTER — APPOINTMENT (OUTPATIENT)
Dept: CT IMAGING | Facility: CLINIC | Age: 80
End: 2025-01-06
Attending: EMERGENCY MEDICINE
Payer: COMMERCIAL

## 2025-01-06 VITALS
HEART RATE: 62 BPM | DIASTOLIC BLOOD PRESSURE: 76 MMHG | RESPIRATION RATE: 20 BRPM | TEMPERATURE: 97.5 F | SYSTOLIC BLOOD PRESSURE: 136 MMHG | WEIGHT: 107.58 LBS | OXYGEN SATURATION: 99 % | BODY MASS INDEX: 19.68 KG/M2

## 2025-01-06 DIAGNOSIS — E87.6 HYPOKALEMIA: ICD-10-CM

## 2025-01-06 DIAGNOSIS — N23 RENAL COLIC ON RIGHT SIDE: ICD-10-CM

## 2025-01-06 DIAGNOSIS — Q62.11 HYDRONEPHROSIS WITH URETEROPELVIC JUNCTION (UPJ) OBSTRUCTION: ICD-10-CM

## 2025-01-06 DIAGNOSIS — N20.0 STONE IN RENAL PELVIS: ICD-10-CM

## 2025-01-06 LAB
ALBUMIN UR-MCNC: 300 MG/DL
AMORPH CRY #/AREA URNS HPF: ABNORMAL /HPF
ANION GAP SERPL CALCULATED.3IONS-SCNC: 19 MMOL/L (ref 7–15)
APPEARANCE UR: ABNORMAL
BACTERIA #/AREA URNS HPF: ABNORMAL /HPF
BASOPHILS # BLD AUTO: 0.1 10E3/UL (ref 0–0.2)
BASOPHILS NFR BLD AUTO: 0 %
BILIRUB UR QL STRIP: NEGATIVE
BUN SERPL-MCNC: 14.2 MG/DL (ref 8–23)
CALCIUM SERPL-MCNC: 10 MG/DL (ref 8.8–10.4)
CAOX CRY #/AREA URNS HPF: ABNORMAL /HPF
CHLORIDE SERPL-SCNC: 97 MMOL/L (ref 98–107)
COLOR UR AUTO: YELLOW
CREAT SERPL-MCNC: 0.98 MG/DL (ref 0.51–0.95)
EGFRCR SERPLBLD CKD-EPI 2021: 58 ML/MIN/1.73M2
EOSINOPHIL # BLD AUTO: 0 10E3/UL (ref 0–0.7)
EOSINOPHIL NFR BLD AUTO: 0 %
ERYTHROCYTE [DISTWIDTH] IN BLOOD BY AUTOMATED COUNT: 13.3 % (ref 10–15)
GLUCOSE SERPL-MCNC: 147 MG/DL (ref 70–99)
GLUCOSE UR STRIP-MCNC: NEGATIVE MG/DL
GRANULAR CAST: 49 /LPF
HCO3 SERPL-SCNC: 25 MMOL/L (ref 22–29)
HCT VFR BLD AUTO: 41.7 % (ref 35–47)
HGB BLD-MCNC: 14.2 G/DL (ref 11.7–15.7)
HGB UR QL STRIP: ABNORMAL
HOLD SPECIMEN: NORMAL
HOLD SPECIMEN: NORMAL
HYALINE CASTS: 12 /LPF
IMM GRANULOCYTES # BLD: 0.1 10E3/UL
IMM GRANULOCYTES NFR BLD: 1 %
KETONES UR STRIP-MCNC: 10 MG/DL
LEUKOCYTE ESTERASE UR QL STRIP: ABNORMAL
LYMPHOCYTES # BLD AUTO: 1.1 10E3/UL (ref 0.8–5.3)
LYMPHOCYTES NFR BLD AUTO: 8 %
MCH RBC QN AUTO: 29.9 PG (ref 26.5–33)
MCHC RBC AUTO-ENTMCNC: 34.1 G/DL (ref 31.5–36.5)
MCV RBC AUTO: 88 FL (ref 78–100)
MONOCYTES # BLD AUTO: 0.5 10E3/UL (ref 0–1.3)
MONOCYTES NFR BLD AUTO: 4 %
MUCOUS THREADS #/AREA URNS LPF: PRESENT /LPF
NEUTROPHILS # BLD AUTO: 11.7 10E3/UL (ref 1.6–8.3)
NEUTROPHILS NFR BLD AUTO: 87 %
NITRATE UR QL: NEGATIVE
NRBC # BLD AUTO: 0 10E3/UL
NRBC BLD AUTO-RTO: 0 /100
PH UR STRIP: 6 [PH] (ref 5–7)
PLATELET # BLD AUTO: 313 10E3/UL (ref 150–450)
POTASSIUM SERPL-SCNC: 3.1 MMOL/L (ref 3.4–5.3)
RBC # BLD AUTO: 4.75 10E6/UL (ref 3.8–5.2)
RBC URINE: >182 /HPF
SODIUM SERPL-SCNC: 141 MMOL/L (ref 135–145)
SP GR UR STRIP: 1.02 (ref 1–1.03)
SQUAMOUS EPITHELIAL: 4 /HPF
UROBILINOGEN UR STRIP-MCNC: 2 MG/DL
WBC # BLD AUTO: 13.5 10E3/UL (ref 4–11)
WBC URINE: 34 /HPF

## 2025-01-06 PROCEDURE — 36415 COLL VENOUS BLD VENIPUNCTURE: CPT | Performed by: EMERGENCY MEDICINE

## 2025-01-06 PROCEDURE — 99285 EMERGENCY DEPT VISIT HI MDM: CPT | Mod: 25

## 2025-01-06 PROCEDURE — 84295 ASSAY OF SERUM SODIUM: CPT | Performed by: EMERGENCY MEDICINE

## 2025-01-06 PROCEDURE — 85004 AUTOMATED DIFF WBC COUNT: CPT | Performed by: EMERGENCY MEDICINE

## 2025-01-06 PROCEDURE — 82310 ASSAY OF CALCIUM: CPT | Performed by: EMERGENCY MEDICINE

## 2025-01-06 PROCEDURE — 87086 URINE CULTURE/COLONY COUNT: CPT | Performed by: EMERGENCY MEDICINE

## 2025-01-06 PROCEDURE — 250N000013 HC RX MED GY IP 250 OP 250 PS 637: Performed by: EMERGENCY MEDICINE

## 2025-01-06 PROCEDURE — 74176 CT ABD & PELVIS W/O CONTRAST: CPT

## 2025-01-06 PROCEDURE — 81001 URINALYSIS AUTO W/SCOPE: CPT | Performed by: EMERGENCY MEDICINE

## 2025-01-06 PROCEDURE — 80048 BASIC METABOLIC PNL TOTAL CA: CPT | Performed by: EMERGENCY MEDICINE

## 2025-01-06 RX ORDER — POTASSIUM CHLORIDE 1500 MG/1
20 TABLET, EXTENDED RELEASE ORAL DAILY
Qty: 5 TABLET | Refills: 0 | Status: SHIPPED | OUTPATIENT
Start: 2025-01-06 | End: 2025-01-11

## 2025-01-06 RX ORDER — OXYCODONE HYDROCHLORIDE 5 MG/1
5-10 TABLET ORAL EVERY 6 HOURS PRN
Qty: 12 TABLET | Refills: 0 | Status: SHIPPED | OUTPATIENT
Start: 2025-01-06 | End: 2025-01-09

## 2025-01-06 RX ORDER — ONDANSETRON 4 MG/1
4 TABLET, ORALLY DISINTEGRATING ORAL EVERY 8 HOURS PRN
Qty: 10 TABLET | Refills: 0 | Status: SHIPPED | OUTPATIENT
Start: 2025-01-06 | End: 2025-01-09

## 2025-01-06 RX ORDER — POTASSIUM CHLORIDE 1500 MG/1
40 TABLET, EXTENDED RELEASE ORAL ONCE
Status: COMPLETED | OUTPATIENT
Start: 2025-01-06 | End: 2025-01-06

## 2025-01-06 RX ADMIN — POTASSIUM CHLORIDE 40 MEQ: 1500 TABLET, EXTENDED RELEASE ORAL at 22:02

## 2025-01-06 ASSESSMENT — COLUMBIA-SUICIDE SEVERITY RATING SCALE - C-SSRS
2. HAVE YOU ACTUALLY HAD ANY THOUGHTS OF KILLING YOURSELF IN THE PAST MONTH?: NO
1. IN THE PAST MONTH, HAVE YOU WISHED YOU WERE DEAD OR WISHED YOU COULD GO TO SLEEP AND NOT WAKE UP?: NO
6. HAVE YOU EVER DONE ANYTHING, STARTED TO DO ANYTHING, OR PREPARED TO DO ANYTHING TO END YOUR LIFE?: NO

## 2025-01-06 ASSESSMENT — ACTIVITIES OF DAILY LIVING (ADL)
ADLS_ACUITY_SCORE: 41
ADLS_ACUITY_SCORE: 41

## 2025-01-06 NOTE — ED TRIAGE NOTES
Patient c/o right flank pain that started this morning, thinks that she is currently passing a kidney stone. Has a history of kidney stones, last one a few years ago. Took some hydrocodone for the pain just before 5pm. Occasional nausea. ABCs intact. VSS.

## 2025-01-07 NOTE — ED PROVIDER NOTES
Emergency Department Note      History of Present Illness     Chief Complaint   Flank Pain      HPI   Sapna Ibrahim is a 79 year old female with history of kidney stones who presents to the ED for flank pain. The patient reports that she has been experiencing pain in the right flank and the right lower quadrant since this morning. She took one oxycodone around 1700 this afternoon which significantly improved her pain. The patient's last kidney stones was a few years ago. She has received treatment for her past kidney stones from her urologist Dr. Milton Rangel. The patient comes into the ED seeking work up for a kidney stone. Denies fever, chills, nausea, vomiting, dysuria, and hematuria.     Independent Historian   None    Review of External Notes   I reviewed past CT scan from 6/17/2024.  This demonstrated a 0.6 cm proximal right ureteral calculus with mild obstruction and a nonobstructing right renal calculus.    Past Medical History     Medical History and Problem List   Nephrolithiasis   Hyperlipidemia    Medications   Crestor     Surgical History   Lithotripsy   Cholecystectomy   Colonoscopy   Hysterectomy     Physical Exam     Patient Vitals for the past 24 hrs:   BP Temp Temp src Pulse Resp SpO2 Weight   01/06/25 1740 136/76 97.5  F (36.4  C) Temporal 62 20 99 % 48.8 kg (107 lb 9.4 oz)     Physical Exam  General: Thin elderly adult sitting upright  Eyes: PERRL, Conjunctive within normal limits  ENT: Moist mucous membranes, oropharynx clear.   CV: Normal S1S2, no murmur, rub or gallop. Regular rate and rhythm  Resp: Clear to auscultation bilaterally, no wheezes, rales or rhonchi. Normal respiratory effort.  GI: Abdomen is soft and nondistended.  Mild right lower quadrant tenderness to palpation.  No palpable masses. No rebound or guarding.  No CVA tenderness to percussion.  MSK: No edema. Nontender. Normal active range of motion.  Skin: Warm and dry. No rashes or lesions or ecchymoses on visible  skin.  Neuro: Alert and oriented. Responds appropriately to all questions and commands. No focal findings appreciated. Normal muscle tone.  Psych: Normal mood and affect. Pleasant.     Diagnostics     Lab Results   Labs Ordered and Resulted from Time of ED Arrival to Time of ED Departure   ROUTINE UA WITH MICROSCOPIC REFLEX TO CULTURE - Abnormal       Result Value    Color Urine Yellow      Appearance Urine Slightly Cloudy (*)     Glucose Urine Negative      Bilirubin Urine Negative      Ketones Urine 10 (*)     Specific Gravity Urine 1.024      Blood Urine Large (*)     pH Urine 6.0      Protein Albumin Urine 300 (*)     Urobilinogen Urine 2.0      Nitrite Urine Negative      Leukocyte Esterase Urine Small (*)     Bacteria Urine Few (*)     Mucus Urine Present (*)     Amorphous Crystals Urine Moderate (*)     Calcium Oxalate Crystals Urine Few (*)     RBC Urine >182 (*)     WBC Urine 34 (*)     Squamous Epithelials Urine 4 (*)     Hyaline Casts Urine 12 (*)     Granular Casts Urine 49 (*)    BASIC METABOLIC PANEL - Abnormal    Sodium 141      Potassium 3.1 (*)     Chloride 97 (*)     Carbon Dioxide (CO2) 25      Anion Gap 19 (*)     Urea Nitrogen 14.2      Creatinine 0.98 (*)     GFR Estimate 58 (*)     Calcium 10.0      Glucose 147 (*)    CBC WITH PLATELETS AND DIFFERENTIAL - Abnormal    WBC Count 13.5 (*)     RBC Count 4.75      Hemoglobin 14.2      Hematocrit 41.7      MCV 88      MCH 29.9      MCHC 34.1      RDW 13.3      Platelet Count 313      % Neutrophils 87      % Lymphocytes 8      % Monocytes 4      % Eosinophils 0      % Basophils 0      % Immature Granulocytes 1      NRBCs per 100 WBC 0      Absolute Neutrophils 11.7 (*)     Absolute Lymphocytes 1.1      Absolute Monocytes 0.5      Absolute Eosinophils 0.0      Absolute Basophils 0.1      Absolute Immature Granulocytes 0.1      Absolute NRBCs 0.0     URINE CULTURE       Imaging   CT Abdomen Pelvis w/o Contrast   Final Result   IMPRESSION:       1.  6 x  3 mm stone at the left ureteropelvic junction with mild associated upstream hydroureteronephrosis.   2.  7 x 6 mm stone of the right renal pelvis, without significant upstream hydronephrosis. 3 mm nonobstructing calyceal calculus of the right inferior pole kidney.   3.  Colonic diverticulosis, without diverticulitis.             Independent Interpretation   None    ED Course      Medications Administered   Medications   potassium chloride brit ER (KLOR-CON M20) CR tablet 40 mEq (40 mEq Oral $Given 1/6/25 2202)       Procedures   Procedures     Discussion of Management   None    ED Course   ED Course as of 01/06/25 2315   Mon Jan 06, 2025 2146 I obtained history and performed physical exam as noted above.    I reassessed the patient and discussed findings.  I discussed plan.  She is feeling much better since taking her home pain medication that she had from previous kidney stones.  She feels comfortable with discharge home.  All questions were answered prior to discharge.    Additional Documentation  None    Medical Decision Making / Diagnosis     CMS Diagnoses: None    MIPS       None    MDM   Sapna Ibrahim is a 79 year old female who presented with unilateral flank and abdominal pain most consistent with renal colic. CT confirms a ureteral stone. Pain is controlled with the medication she had at home for pain.  There is no fever or evidence of a urinary tract infection.  No indication for emergent urology consult or admission.  She has normal renal function and no signs of fever.  The patient will be discharged with opioid analgesics and Ibuprofen for pain.   Zofran was prescribed for nausea.  I considered other etiologies for these symptoms including AAA and pyelonephritis or appendicitis but these are unlikely given the otherwise normal CT scan and urinalysis.  The patient is instructed to return if increasing pain not controlled with pain meds, vomiting, and fever.  I recommended follow-up with urology as  given the stone size, she may need intervention in the near future.  She was incidentally noted to be mildly hypokalemic and was given oral supplementation here I recommended ongoing supplementation with recheck with her PCP in the next 3 to 5 days.  She has a urologist and plans to call them tomorrow as well.   Return precautions discussed including fever, uncontrolled pain, uncontrolled vomiting.  All questions were answered prior to discharge.    Disposition   The patient was discharged.     Diagnosis     ICD-10-CM    1. Renal colic on right side  N23       2. Stone in renal pelvis  N20.0     bilateral      3. Hydronephrosis with ureteropelvic junction (UPJ) obstruction  Q62.11     left      4. Hypokalemia  E87.6            Discharge Medications   Discharge Medication List as of 1/6/2025 10:12 PM        START taking these medications    Details   ondansetron (ZOFRAN ODT) 4 MG ODT tab Take 1 tablet (4 mg) by mouth every 8 hours as needed for nausea or vomiting., Disp-10 tablet, R-0, E-Prescribe      oxyCODONE (ROXICODONE) 5 MG tablet Take 1-2 tablets (5-10 mg) by mouth every 6 hours as needed for severe pain., Disp-12 tablet, R-0, E-Prescribe      potassium chloride ER (K-TAB) 20 MEQ CR tablet Take 1 tablet (20 mEq) by mouth daily for 5 days., Disp-5 tablet, R-0, E-Prescribe               Scribe Disclosure:  Kady RICCI, am serving as a scribe at 10:52 PM on 1/6/2025 to document services personally performed by Virginia Dorantes MD based on my observations and the provider's statements to me.        Virginia Dorantes MD  01/07/25 0042

## 2025-01-08 LAB — BACTERIA UR CULT: NORMAL

## 2025-01-13 ENCOUNTER — HOSPITAL ENCOUNTER (OUTPATIENT)
Dept: MAMMOGRAPHY | Facility: CLINIC | Age: 80
Discharge: HOME | End: 2025-01-13
Attending: PHYSICIAN ASSISTANT | Admitting: PHYSICIAN ASSISTANT
Payer: COMMERCIAL

## 2025-01-13 DIAGNOSIS — Z12.31 SCREENING MAMMOGRAM, ENCOUNTER FOR: ICD-10-CM

## 2025-01-13 PROCEDURE — 77063 BREAST TOMOSYNTHESIS BI: CPT

## 2025-01-22 ENCOUNTER — TRANSFERRED RECORDS (OUTPATIENT)
Dept: FAMILY MEDICINE | Facility: CLINIC | Age: 80
End: 2025-01-22

## 2025-01-29 ENCOUNTER — OFFICE VISIT (OUTPATIENT)
Dept: FAMILY MEDICINE | Facility: CLINIC | Age: 80
End: 2025-01-29

## 2025-01-29 VITALS
BODY MASS INDEX: 19.91 KG/M2 | DIASTOLIC BLOOD PRESSURE: 64 MMHG | HEIGHT: 62 IN | TEMPERATURE: 97.2 F | OXYGEN SATURATION: 98 % | SYSTOLIC BLOOD PRESSURE: 128 MMHG | HEART RATE: 73 BPM | WEIGHT: 108.2 LBS

## 2025-01-29 DIAGNOSIS — E78.2 MIXED HYPERLIPIDEMIA: ICD-10-CM

## 2025-01-29 DIAGNOSIS — N23 RENAL COLIC ON RIGHT SIDE: ICD-10-CM

## 2025-01-29 DIAGNOSIS — Z01.818 PRE-OPERATIVE EXAMINATION: Primary | ICD-10-CM

## 2025-01-29 DIAGNOSIS — R73.03 PREDIABETES: ICD-10-CM

## 2025-01-29 DIAGNOSIS — E87.6 HYPOKALEMIA: ICD-10-CM

## 2025-01-29 LAB
ALBUMIN SERPL-MCNC: 4.1 G/DL (ref 3.6–5.1)
ALP SERPL-CCNC: 53 U/L (ref 33–130)
ALT 1742-6: 13 U/L (ref 0–32)
AST 1920-8: 17 U/L (ref 0–35)
BILIRUB SERPL-MCNC: 1 MG/DL (ref 0.2–1.2)
BUN SERPL-MCNC: 11 MG/DL (ref 7–25)
BUN/CREATININE RATIO: 12 (ref 6–32)
CALCIUM SERPL-MCNC: 9.1 MG/DL (ref 8.6–10.3)
CHLORIDE SERPLBLD-SCNC: 99.5 MMOL/L (ref 98–110)
CO2 SERPL-SCNC: 32.9 MMOL/L (ref 20–32)
CREAT SERPL-MCNC: 0.93 MG/DL (ref 0.6–1.3)
ERYTHROCYTE [DISTWIDTH] IN BLOOD BY AUTOMATED COUNT: 13.9 %
GLUCOSE SERPL-MCNC: 191 MG/DL (ref 60–99)
HCT VFR BLD AUTO: 42.1 % (ref 35–47)
HEMOGLOBIN: 13.9 G/DL (ref 11.7–15.7)
MCH RBC QN AUTO: 30.9 PG (ref 26–33)
MCHC RBC AUTO-ENTMCNC: 33 G/DL (ref 31–36)
MCV RBC AUTO: 93.5 FL (ref 78–100)
PLATELET COUNT - QUEST: 258 10^9/L (ref 150–375)
POTASSIUM SERPL-SCNC: 3.3 MMOL/L (ref 3.5–5.3)
PROT SERPL-MCNC: 6.1 G/DL (ref 6.1–8.1)
RBC # BLD AUTO: 4.5 10*12/L (ref 3.8–5.2)
SODIUM SERPL-SCNC: 141.7 MMOL/L (ref 135–146)
WBC # BLD AUTO: 8.5 10*9/L (ref 4–11)

## 2025-01-29 PROCEDURE — 36415 COLL VENOUS BLD VENIPUNCTURE: CPT | Performed by: PHYSICIAN ASSISTANT

## 2025-01-29 PROCEDURE — 85027 COMPLETE CBC AUTOMATED: CPT | Performed by: PHYSICIAN ASSISTANT

## 2025-01-29 PROCEDURE — 80053 COMPREHEN METABOLIC PANEL: CPT | Performed by: PHYSICIAN ASSISTANT

## 2025-01-29 PROCEDURE — 99214 OFFICE O/P EST MOD 30 MIN: CPT | Performed by: PHYSICIAN ASSISTANT

## 2025-01-29 NOTE — PROGRESS NOTES
Preoperative Evaluation  [facility]YS[facility]ANS  [address] [place] [address]  [address]  [facility] [address]  Phone: [phone]  Fax: [phone]  Primary Provider: Verena Freedman PA-C  Pre-op Performing Provider: Verena Freedman PA-C  Jan 29, 2025 1/29/2025   Surgical Information   What procedure is being done? cystoscopy , bilateral ureteroscopy with holmium laser lithotripsy , possible bilateral ureteral stent placement   Facility or Hospital where procedure/surgery will be performed: [facility]   Who is doing the procedure / surgery? Ceasar Delgado MD   Date of surgery / procedure: 2/10/25   Time of surgery / procedure: 9:00 am   Where do you plan to recover after surgery? at home with family     Fax number for surgical facility: Note does not need to be faxed, will be available electronically in Epic.    Assessment & Plan     The proposed surgical procedure is considered INTERMEDIATE risk.    Pre-operative examination  Sapna is doing well. Labs reassuring other than mild hyperkalemia. Recommended start potassium chloride 10 meq daily and stop on 2/14/25 after surgery to resolve stones. Recheck in 1 month. Okay to proceed with scheduled procedure.   - VENOUS COLLECTION  - Hemogram Platelet (BFP)  - Comprehensive Metobolic Panel (BFP)    Renal colic on right side  - VENOUS COLLECTION  - Hemogram Platelet (BFP)  - Comprehensive Metobolic Panel (BFP)    Mixed hyperlipidemia  - VENOUS COLLECTION  - Hemogram Platelet (BFP)  - Comprehensive Metobolic Panel (BFP)    Hypokalemia  - potassium chloride brit ER (KLOR-CON M10) 10 MEQ CR tablet; Take 1 tablet (10 mEq) by mouth daily.  - VENOUS COLLECTION; Standing  - Basic Metabolic Panel (BFP); Standing       - No identified additional risk factors other than previously addressed    Antiplatelet or Anticoagulation Medication Instructions   - aspirin: Discontinue aspirin 7 days prior to procedure to reduce bleeding  risk. It should be resumed postoperatively.     Additional Medication Instructions  Take all scheduled medications on the day of surgery    Recommendation  Approval given to proceed with proposed procedure, without further diagnostic evaluation.    Klever Alejo is a 79 year old, presenting for the following:  Pre-Op Exam (DOS 2/10/25 Cystoscopy at [facility] done by Dr. Delgado)      HPI related to upcoming procedure: Has been having renal colic from right kidney stone since 6/2024. Please is for lithotripsy.           1/29/2025   Pre-Op Questionnaire   Have you ever had a heart attack or stroke? No   Have you ever had surgery on your heart or blood vessels, such as a stent placement, a coronary artery bypass, or surgery on an artery in your head, neck, heart, or legs? No   Do you have chest pain with activity? No   Do you have a history of heart failure? No   Do you currently have a cold, bronchitis or symptoms of other infection? No   Do you have a cough, shortness of breath, or wheezing? No   Do you or anyone in your family have previous history of blood clots? No   Do you or does anyone in your family have a serious bleeding problem such as prolonged bleeding following surgeries or cuts? No   Have you ever had problems with anemia or been told to take iron pills? No   Have you had any abnormal blood loss such as black, tarry or bloody stools, or abnormal vaginal bleeding? No   Have you ever had a blood transfusion? No   Are you willing to have a blood transfusion if it is medically needed before, during, or after your surgery? Yes   Have you or any of your relatives ever had problems with anesthesia? No   Do you have sleep apnea, excessive snoring or daytime drowsiness? No   Do you have any artifical heart valves or other implanted medical devices like a pacemaker, defibrillator, or continuous glucose monitor? No   Do you have artificial joints? No   Are you allergic to latex? No     Health  Care Directive  Patient does not have a Health Care Directive: Discussed advance care planning with patient; information given to patient to review.    Preoperative Review of    reviewed - controlled substances prescribed by other outside provider(s).  Oxycodone prescribed 1/7/2025. Hasn't used but has occasionally used leftover Norco from 6/2024 on occasion.    Status of Chronic Conditions:  HYPERLIPIDEMIA - Patient has a long history of significant Hyperlipidemia requiring medication for treatment with recent good control. Patient reports no problems or side effects with the medication.     Patient Active Problem List    Diagnosis Date Noted    Age-related osteoporosis without current pathological fracture 04/22/2024     Priority: Medium    Prediabetes 04/15/2024     Priority: Medium    Elevated coronary artery calcium score 04/11/2023     Priority: Medium    Dermatitis 05/08/2014     Priority: Medium    Calculus of kidney 12/05/2013     Priority: Medium     First episode 10/27/13      Mixed hyperlipidemia      Priority: Medium     Problem list name updated by automated process. Provider to review      Allergic state      Priority: Medium     Problem list name updated by automated process. Provider to review      ACP (advance care planning) 05/25/2011     Priority: Low     Advance Care Planning:   ACP Review and Resources Provided:  Reviewed chart for advance care plan.  Sapna LYNDA Ibrahim has no plan or code status on file. Discussed available resources and provided with information. Confirmed code status reflects current choices pending further ACP discussions.  Confirmed/documented designated decision maker(s). See permanent comments section of demographics in clinical tab.   Added by Dawna Espinosa on 5/8/2014                Past Medical History:   Diagnosis Date    Allergic rhinitis due to pollen     Elevated coronary artery calcium score     Mixed dyslipidemia      Past Surgical History:   Procedure  Laterality Date    CATARACT IOL, RT/LT Bilateral     COLONOSCOPY  09/01/2014    hemorrhoids;     COMBINED CYSTOSCOPY, INSERT STENT URETER(S)  12/10/2013    Procedure: COMBINED CYSTOSCOPY, INSERT STENT URETER(S);;  Surgeon: Milton Rangel MD;  Location:  OR    EXTRACORPOREAL SHOCK WAVE LITHOTRIPSY (ESWL)  12/10/2013    Procedure: EXTRACORPOREAL SHOCK WAVE LITHOTRIPSY (ESWL);  RIGHT EXTRACORPOREAL SHOCK WAVE LITHOTRIPSY, CYSTOSCOPY WITH RIGHT STENT PLACEMENT, RIGHT RETROGRADE;  Surgeon: Milton Rangel MD;  Location:  OR    EXTRACORPOREAL SHOCK WAVE LITHOTRIPSY (ESWL)  05/13/2014    Procedure: EXTRACORPOREAL SHOCK WAVE LITHOTRIPSY (ESWL);  Surgeon: Milton Rangel MD;  Location:  OR    HC LAPAROSCOPY, SURGICAL; CHOLECYSTECTOMY  01/01/1996    open procedure    HC REMOVAL OF TONSILS,12+ Y/O      age 21    HCL PAP SMEAR  01/01/1999    normal    RELEASE CARPAL TUNNEL  12/01/2015    VASCULAR SURGERY  01/01/1994    microvascular decompression of trigeminal nerve (MVD)    [facility] NONSPECIFIC PROCEDURE  01/01/1994    microvascular decompression of the right trigeminal nerve    [facility] VAGINAL HYSTERECTOMY  01/01/1983    menorhagia; both ovaries remain    ZZ COLONOSCOPY THRU STOMA, DIAGNOSTIC  07/08/2003    normal; diverticulosis     Current Outpatient Medications   Medication Sig Dispense Refill    aspirin (ASA) 81 MG chewable tablet Take 1 tablet (81 mg) by mouth daily.      Multiple Vitamins-Minerals (PRESERVISION AREDS) TABS Take by mouth daily      potassium chloride brit ER (KLOR-CON M10) 10 MEQ CR tablet Take 1 tablet (10 mEq) by mouth daily. 30 tablet 0    rosuvastatin (CRESTOR) 40 MG tablet Take 1 tablet (40 mg) by mouth daily. 100 tablet 6    Turmeric 400 MG CAPS Take 1 capsule by mouth daily      UNABLE TO FIND Take by mouth daily Elderberry Syrup      Vitamin D3 (CHOLECALCIFEROL) 125 MCG (5000 UT) tablet Take by mouth daily         Allergies   Allergen Reactions    Meperidine Hcl      sick to stomache    Percodan  "[Aspirin]         Social History     Tobacco Use    Smoking status: Former     Current packs/day: 0.00     Average packs/day: 0.5 packs/day for 20.0 years (10.0 ttl pk-yrs)     Types: Cigarettes     Start date:      Quit date:      Years since quittin.1     Passive exposure: Never    Smokeless tobacco: Never   Substance Use Topics    Alcohol use: Yes     Comment: once a month     Family History   Adopted: Yes   Problem Relation Age of Onset    Multiple Sclerosis Daughter     Colon Cancer No family hx of     Breast Cancer No family hx of      History   Drug Use Unknown             Review of Systems  Constitutional, neuro, ENT, endocrine, pulmonary, cardiac, gastrointestinal, genitourinary, musculoskeletal, integument and psychiatric systems are negative, except as otherwise noted.    Objective    /64 (BP Location: Right arm, Patient Position: Sitting, Cuff Size: Adult Regular)   Pulse 73   Temp 97.2  F (36.2  C) (Temporal)   Ht 1.575 m (5' 2\")   Wt 49.1 kg (108 lb 3.2 oz)   SpO2 98%   BMI 19.79 kg/m     Estimated body mass index is 19.79 kg/m  as calculated from the following:    Height as of this encounter: 1.575 m (5' 2\").    Weight as of this encounter: 49.1 kg (108 lb 3.2 oz).  Physical Exam  GENERAL: alert and no distress  EYES: Eyes grossly normal to inspection, PERRL and conjunctivae and sclerae normal  HENT: ear canals and TM's normal, nose and mouth without ulcers or lesions  NECK: no adenopathy, no asymmetry, masses, or scars  RESP: lungs clear to auscultation - no rales, rhonchi or wheezes  CV: regular rate and rhythm, normal S1 S2, no S3 or S4, no murmur, click or rub, no peripheral edema  ABDOMEN: soft, nontender, no hepatosplenomegaly, no masses and bowel sounds normal  MS: no gross musculoskeletal defects noted, no edema  SKIN: no suspicious lesions or rashes  NEURO: Normal strength and tone, mentation intact and speech normal  PSYCH: mentation appears normal, affect " normal/bright    Diagnostics  Recent Results (from the past 48 hours)   Hemogram Platelet (BFP)    Collection Time: 01/29/25  4:09 PM   Result Value Ref Range    WBC 8.5 4.0 - 11 10*9/L    RBC Count 4.50 3.8 - 5.2 10*12/L    Hemoglobin 13.9 11.7 - 15.7 g/dL    Hematocrit 42.1 35.0 - 47.0 %    MCV 93.5 78 - 100 fL    MCH 30.9 26 - 33 pg    MCHC 33.0 31 - 36 g/dL    RDW 13.9 %    Platelet Count 258 150 - 375 10^9/L   Comprehensive Metobolic Panel (BFP)    Collection Time: 01/29/25  4:10 PM   Result Value Ref Range    Carbon Dioxide 32.9 (A) 20 - 32 mmol/L    Creatinine 0.93 0.60 - 1.30 mg/dL    Glucose 191 (A) 60 - 99 mg/dL    Sodium 141.7 135 - 146 mmol/L    Potassium 3.30 (A) 3.5 - 5.3 mmol/L    Chloride 99.5 98 - 110 mmol/L    Protein Total 6.1 6.1 - 8.1 g/dL    Albumin 4.1 3.6 - 5.1 g/dL    Alkaline Phosphatase 53 33 - 130 U/L    ALT 13 0 - 32 U/L    AST 17 0 - 35 U/L    Bilirubin Total 1.0 0.2 - 1.2 mg/dL    Urea Nitrogen 11 7 - 25 mg/dL    Calcium 9.1 8.6 - 10.3 mg/dL    BUN/Creatinine Ratio 12 6 - 32      Cardiac screening:  Pt had echocardiogram with EKG 10/2024 through cardiology which was reassuring.     Revised Cardiac Risk Index (RCRI)  The patient has the following serious cardiovascular risks for perioperative complications:   - No serious cardiac risks = 0 points     RCRI Interpretation: 1 point: Class II (low risk - 0.9% complication rate)     Signed Electronically by: Verena Freedman PA-C  A copy of this evaluation report is provided to the requesting physician.

## 2025-01-29 NOTE — NURSING NOTE
Chief Complaint   Patient presents with    Pre-Op Exam     DOS 2/10/25 Cystoscopy at [facility] done by Dr. Delgado

## 2025-01-30 RX ORDER — POTASSIUM CHLORIDE 750 MG/1
10 TABLET, EXTENDED RELEASE ORAL DAILY
Qty: 30 TABLET | Refills: 0 | Status: SHIPPED | OUTPATIENT
Start: 2025-01-30

## 2025-02-09 ENCOUNTER — ANESTHESIA EVENT (OUTPATIENT)
Dept: SURGERY | Facility: CLINIC | Age: 80
End: 2025-02-09
Payer: COMMERCIAL

## 2025-02-10 ENCOUNTER — APPOINTMENT (OUTPATIENT)
Dept: GENERAL RADIOLOGY | Facility: CLINIC | Age: 80
End: 2025-02-10
Attending: UROLOGY
Payer: COMMERCIAL

## 2025-02-10 ENCOUNTER — ANESTHESIA (OUTPATIENT)
Dept: SURGERY | Facility: CLINIC | Age: 80
End: 2025-02-10
Payer: COMMERCIAL

## 2025-02-10 ENCOUNTER — HOSPITAL ENCOUNTER (OUTPATIENT)
Facility: CLINIC | Age: 80
Discharge: HOME | End: 2025-02-10
Attending: UROLOGY | Admitting: UROLOGY
Payer: COMMERCIAL

## 2025-02-10 VITALS
BODY MASS INDEX: 19.88 KG/M2 | RESPIRATION RATE: 11 BRPM | HEART RATE: 71 BPM | TEMPERATURE: 98.4 F | OXYGEN SATURATION: 98 % | WEIGHT: 108 LBS | DIASTOLIC BLOOD PRESSURE: 59 MMHG | SYSTOLIC BLOOD PRESSURE: 102 MMHG | HEIGHT: 62 IN

## 2025-02-10 DIAGNOSIS — N20.0 KIDNEY STONE: Primary | ICD-10-CM

## 2025-02-10 LAB — POTASSIUM SERPL-SCNC: 3.7 MMOL/L (ref 3.4–5.3)

## 2025-02-10 PROCEDURE — 36415 COLL VENOUS BLD VENIPUNCTURE: CPT | Performed by: ANESTHESIOLOGY

## 2025-02-10 PROCEDURE — 250N000011 HC RX IP 250 OP 636: Performed by: ANESTHESIOLOGY

## 2025-02-10 PROCEDURE — 250N000025 HC SEVOFLURANE, PER MIN: Performed by: UROLOGY

## 2025-02-10 PROCEDURE — 710N000009 HC RECOVERY PHASE 1, LEVEL 1, PER MIN: Performed by: UROLOGY

## 2025-02-10 PROCEDURE — 250N000009 HC RX 250: Performed by: ANESTHESIOLOGY

## 2025-02-10 PROCEDURE — 84132 ASSAY OF SERUM POTASSIUM: CPT | Performed by: ANESTHESIOLOGY

## 2025-02-10 PROCEDURE — C2617 STENT, NON-COR, TEM W/O DEL: HCPCS | Performed by: UROLOGY

## 2025-02-10 PROCEDURE — 999N000179 XR SURGERY CARM FLUORO LESS THAN 5 MIN W STILLS

## 2025-02-10 PROCEDURE — 999N000141 HC STATISTIC PRE-PROCEDURE NURSING ASSESSMENT: Performed by: UROLOGY

## 2025-02-10 PROCEDURE — 360N000076 HC SURGERY LEVEL 3, PER MIN: Performed by: UROLOGY

## 2025-02-10 PROCEDURE — 258N000003 HC RX IP 258 OP 636: Performed by: ANESTHESIOLOGY

## 2025-02-10 PROCEDURE — C1894 INTRO/SHEATH, NON-LASER: HCPCS | Performed by: UROLOGY

## 2025-02-10 PROCEDURE — 250N000011 HC RX IP 250 OP 636: Performed by: PHYSICIAN ASSISTANT

## 2025-02-10 PROCEDURE — 250N000013 HC RX MED GY IP 250 OP 250 PS 637: Performed by: PHYSICIAN ASSISTANT

## 2025-02-10 PROCEDURE — 370N000017 HC ANESTHESIA TECHNICAL FEE, PER MIN: Performed by: UROLOGY

## 2025-02-10 PROCEDURE — C1769 GUIDE WIRE: HCPCS | Performed by: UROLOGY

## 2025-02-10 PROCEDURE — 250N000009 HC RX 250: Performed by: UROLOGY

## 2025-02-10 PROCEDURE — 710N000012 HC RECOVERY PHASE 2, PER MINUTE: Performed by: UROLOGY

## 2025-02-10 PROCEDURE — 272N000001 HC OR GENERAL SUPPLY STERILE: Performed by: UROLOGY

## 2025-02-10 PROCEDURE — C1747 HC OR ENDOSCOPE, SGL USE,URINARY TRACT, IMAGING/ILLUMINATION DEVICE: HCPCS | Performed by: UROLOGY

## 2025-02-10 DEVICE — URETERAL STENT
Type: IMPLANTABLE DEVICE | Site: URETER | Status: FUNCTIONAL
Brand: POLARIS™ ULTRA

## 2025-02-10 RX ORDER — DEXAMETHASONE SODIUM PHOSPHATE 4 MG/ML
INJECTION, SOLUTION INTRA-ARTICULAR; INTRALESIONAL; INTRAMUSCULAR; INTRAVENOUS; SOFT TISSUE PRN
Status: DISCONTINUED | OUTPATIENT
Start: 2025-02-10 | End: 2025-02-10

## 2025-02-10 RX ORDER — FENTANYL CITRATE 50 UG/ML
INJECTION, SOLUTION INTRAMUSCULAR; INTRAVENOUS PRN
Status: DISCONTINUED | OUTPATIENT
Start: 2025-02-10 | End: 2025-02-10

## 2025-02-10 RX ORDER — DEXAMETHASONE SODIUM PHOSPHATE 4 MG/ML
4 INJECTION, SOLUTION INTRA-ARTICULAR; INTRALESIONAL; INTRAMUSCULAR; INTRAVENOUS; SOFT TISSUE
Status: DISCONTINUED | OUTPATIENT
Start: 2025-02-10 | End: 2025-02-10 | Stop reason: HOSPADM

## 2025-02-10 RX ORDER — ONDANSETRON 2 MG/ML
4 INJECTION INTRAMUSCULAR; INTRAVENOUS EVERY 30 MIN PRN
Status: DISCONTINUED | OUTPATIENT
Start: 2025-02-10 | End: 2025-02-10 | Stop reason: HOSPADM

## 2025-02-10 RX ORDER — NALOXONE HYDROCHLORIDE 0.4 MG/ML
0.1 INJECTION, SOLUTION INTRAMUSCULAR; INTRAVENOUS; SUBCUTANEOUS
Status: DISCONTINUED | OUTPATIENT
Start: 2025-02-10 | End: 2025-02-10 | Stop reason: HOSPADM

## 2025-02-10 RX ORDER — SODIUM CHLORIDE, SODIUM LACTATE, POTASSIUM CHLORIDE, CALCIUM CHLORIDE 600; 310; 30; 20 MG/100ML; MG/100ML; MG/100ML; MG/100ML
INJECTION, SOLUTION INTRAVENOUS CONTINUOUS
Status: DISCONTINUED | OUTPATIENT
Start: 2025-02-10 | End: 2025-02-10 | Stop reason: HOSPADM

## 2025-02-10 RX ORDER — CEPHALEXIN 500 MG/1
500 CAPSULE ORAL 2 TIMES DAILY
Qty: 4 CAPSULE | Refills: 0 | Status: SHIPPED | OUTPATIENT
Start: 2025-02-10 | End: 2025-02-12

## 2025-02-10 RX ORDER — ACETAMINOPHEN 325 MG/1
975 TABLET ORAL ONCE
Status: COMPLETED | OUTPATIENT
Start: 2025-02-10 | End: 2025-02-10

## 2025-02-10 RX ORDER — HYDROMORPHONE HCL IN WATER/PF 6 MG/30 ML
0.2 PATIENT CONTROLLED ANALGESIA SYRINGE INTRAVENOUS EVERY 5 MIN PRN
Status: DISCONTINUED | OUTPATIENT
Start: 2025-02-10 | End: 2025-02-10 | Stop reason: HOSPADM

## 2025-02-10 RX ORDER — SODIUM CHLORIDE, SODIUM LACTATE, POTASSIUM CHLORIDE, CALCIUM CHLORIDE 600; 310; 30; 20 MG/100ML; MG/100ML; MG/100ML; MG/100ML
INJECTION, SOLUTION INTRAVENOUS CONTINUOUS PRN
Status: DISCONTINUED | OUTPATIENT
Start: 2025-02-10 | End: 2025-02-10

## 2025-02-10 RX ORDER — CEFAZOLIN SODIUM/WATER 2 G/20 ML
2 SYRINGE (ML) INTRAVENOUS
Status: COMPLETED | OUTPATIENT
Start: 2025-02-10 | End: 2025-02-10

## 2025-02-10 RX ORDER — LIDOCAINE HYDROCHLORIDE 20 MG/ML
INJECTION, SOLUTION INFILTRATION; PERINEURAL PRN
Status: DISCONTINUED | OUTPATIENT
Start: 2025-02-10 | End: 2025-02-10

## 2025-02-10 RX ORDER — DEXMEDETOMIDINE HYDROCHLORIDE 4 UG/ML
INJECTION, SOLUTION INTRAVENOUS PRN
Status: DISCONTINUED | OUTPATIENT
Start: 2025-02-10 | End: 2025-02-10

## 2025-02-10 RX ORDER — PROPOFOL 10 MG/ML
INJECTION, EMULSION INTRAVENOUS PRN
Status: DISCONTINUED | OUTPATIENT
Start: 2025-02-10 | End: 2025-02-10

## 2025-02-10 RX ORDER — FENTANYL CITRATE 0.05 MG/ML
25 INJECTION, SOLUTION INTRAMUSCULAR; INTRAVENOUS EVERY 5 MIN PRN
Status: DISCONTINUED | OUTPATIENT
Start: 2025-02-10 | End: 2025-02-10 | Stop reason: HOSPADM

## 2025-02-10 RX ORDER — GLYCOPYRROLATE 0.2 MG/ML
INJECTION, SOLUTION INTRAMUSCULAR; INTRAVENOUS PRN
Status: DISCONTINUED | OUTPATIENT
Start: 2025-02-10 | End: 2025-02-10

## 2025-02-10 RX ORDER — HYDROMORPHONE HCL IN WATER/PF 6 MG/30 ML
0.4 PATIENT CONTROLLED ANALGESIA SYRINGE INTRAVENOUS EVERY 5 MIN PRN
Status: DISCONTINUED | OUTPATIENT
Start: 2025-02-10 | End: 2025-02-10 | Stop reason: HOSPADM

## 2025-02-10 RX ORDER — CEFAZOLIN SODIUM/WATER 2 G/20 ML
2 SYRINGE (ML) INTRAVENOUS SEE ADMIN INSTRUCTIONS
Status: DISCONTINUED | OUTPATIENT
Start: 2025-02-10 | End: 2025-02-10 | Stop reason: HOSPADM

## 2025-02-10 RX ORDER — FENTANYL CITRATE 0.05 MG/ML
25 INJECTION, SOLUTION INTRAMUSCULAR; INTRAVENOUS
Status: DISCONTINUED | OUTPATIENT
Start: 2025-02-10 | End: 2025-02-10 | Stop reason: HOSPADM

## 2025-02-10 RX ORDER — FENTANYL CITRATE 0.05 MG/ML
50 INJECTION, SOLUTION INTRAMUSCULAR; INTRAVENOUS EVERY 5 MIN PRN
Status: DISCONTINUED | OUTPATIENT
Start: 2025-02-10 | End: 2025-02-10 | Stop reason: HOSPADM

## 2025-02-10 RX ORDER — MAGNESIUM HYDROXIDE 1200 MG/15ML
LIQUID ORAL PRN
Status: DISCONTINUED | OUTPATIENT
Start: 2025-02-10 | End: 2025-02-10 | Stop reason: HOSPADM

## 2025-02-10 RX ORDER — ONDANSETRON 4 MG/1
4 TABLET, ORALLY DISINTEGRATING ORAL EVERY 30 MIN PRN
Status: DISCONTINUED | OUTPATIENT
Start: 2025-02-10 | End: 2025-02-10 | Stop reason: HOSPADM

## 2025-02-10 RX ORDER — ONDANSETRON 2 MG/ML
INJECTION INTRAMUSCULAR; INTRAVENOUS PRN
Status: DISCONTINUED | OUTPATIENT
Start: 2025-02-10 | End: 2025-02-10

## 2025-02-10 RX ORDER — LABETALOL HYDROCHLORIDE 5 MG/ML
10 INJECTION, SOLUTION INTRAVENOUS
Status: DISCONTINUED | OUTPATIENT
Start: 2025-02-10 | End: 2025-02-10 | Stop reason: HOSPADM

## 2025-02-10 RX ORDER — ACETAMINOPHEN 650 MG/1
650 SUPPOSITORY RECTAL ONCE
Status: COMPLETED | OUTPATIENT
Start: 2025-02-10 | End: 2025-02-10

## 2025-02-10 RX ADMIN — DEXMEDETOMIDINE HYDROCHLORIDE 8 MCG: 200 INJECTION INTRAVENOUS at 12:28

## 2025-02-10 RX ADMIN — FENTANYL CITRATE 25 MCG: 50 INJECTION INTRAMUSCULAR; INTRAVENOUS at 12:21

## 2025-02-10 RX ADMIN — PHENYLEPHRINE HYDROCHLORIDE 100 MCG: 10 INJECTION INTRAVENOUS at 12:09

## 2025-02-10 RX ADMIN — DEXMEDETOMIDINE HYDROCHLORIDE 4 MCG: 200 INJECTION INTRAVENOUS at 13:08

## 2025-02-10 RX ADMIN — PHENYLEPHRINE HYDROCHLORIDE 50 MCG: 10 INJECTION INTRAVENOUS at 12:36

## 2025-02-10 RX ADMIN — FENTANYL CITRATE 25 MCG: 50 INJECTION INTRAMUSCULAR; INTRAVENOUS at 12:07

## 2025-02-10 RX ADMIN — Medication 100 MG: at 13:03

## 2025-02-10 RX ADMIN — Medication 2 G: at 11:58

## 2025-02-10 RX ADMIN — ONDANSETRON 4 MG: 2 INJECTION INTRAMUSCULAR; INTRAVENOUS at 12:57

## 2025-02-10 RX ADMIN — SODIUM CHLORIDE, POTASSIUM CHLORIDE, SODIUM LACTATE AND CALCIUM CHLORIDE: 600; 310; 30; 20 INJECTION, SOLUTION INTRAVENOUS at 11:58

## 2025-02-10 RX ADMIN — DEXAMETHASONE SODIUM PHOSPHATE 4 MG: 4 INJECTION, SOLUTION INTRA-ARTICULAR; INTRALESIONAL; INTRAMUSCULAR; INTRAVENOUS; SOFT TISSUE at 12:12

## 2025-02-10 RX ADMIN — FENTANYL CITRATE 25 MCG: 50 INJECTION INTRAMUSCULAR; INTRAVENOUS at 12:15

## 2025-02-10 RX ADMIN — LIDOCAINE HYDROCHLORIDE 60 MG: 20 INJECTION, SOLUTION INFILTRATION; PERINEURAL at 12:07

## 2025-02-10 RX ADMIN — ROCURONIUM BROMIDE 30 MG: 50 INJECTION, SOLUTION INTRAVENOUS at 12:07

## 2025-02-10 RX ADMIN — PROPOFOL 200 MG: 10 INJECTION, EMULSION INTRAVENOUS at 12:07

## 2025-02-10 RX ADMIN — DEXMEDETOMIDINE HYDROCHLORIDE 8 MCG: 200 INJECTION INTRAVENOUS at 12:57

## 2025-02-10 RX ADMIN — PROPOFOL 20 MG: 10 INJECTION, EMULSION INTRAVENOUS at 12:30

## 2025-02-10 RX ADMIN — PHENYLEPHRINE HYDROCHLORIDE 0.3 MCG/KG/MIN: 10 INJECTION INTRAVENOUS at 12:10

## 2025-02-10 RX ADMIN — FENTANYL CITRATE 25 MCG: 50 INJECTION INTRAMUSCULAR; INTRAVENOUS at 12:24

## 2025-02-10 RX ADMIN — ACETAMINOPHEN 975 MG: 325 TABLET, FILM COATED ORAL at 10:53

## 2025-02-10 RX ADMIN — GLYCOPYRROLATE 0.2 MG: 0.2 INJECTION, SOLUTION INTRAMUSCULAR; INTRAVENOUS at 13:02

## 2025-02-10 ASSESSMENT — ACTIVITIES OF DAILY LIVING (ADL)
ADLS_ACUITY_SCORE: 43
ADLS_ACUITY_SCORE: 41
ADLS_ACUITY_SCORE: 43

## 2025-02-10 ASSESSMENT — LIFESTYLE VARIABLES: TOBACCO_USE: 0

## 2025-02-10 ASSESSMENT — ENCOUNTER SYMPTOMS
DYSRHYTHMIAS: 0
SEIZURES: 0

## 2025-02-10 NOTE — ANESTHESIA PROCEDURE NOTES
Airway       Patient location during procedure: OR       Procedure Start/Stop Times: 2/10/2025 12:10 PM  Staff -        Anesthesiologist:  Toni Elam MD       CRNA: Jess Groves APRN CRNA       Performed By: CRNA  Consent for Airway        Urgency: elective  Indications and Patient Condition       Indications for airway management: rayray-procedural       Induction type:intravenous       Mask difficulty assessment: 1 - vent by mask    Final Airway Details       Final airway type: endotracheal airway       Successful airway: ETT - single and Oral  Endotracheal Airway Details        ETT size (mm): 7.0       Cuffed: yes       Successful intubation technique: direct laryngoscopy       DL Blade Type: Pool 2       Grade View of Cords: 1       Adjucts: stylet       Position: Right       Measured from: gums/teeth       Secured at (cm): 21       Bite block used: None    Post intubation assessment        Placement verified by: capnometry, equal breath sounds and chest rise        Number of attempts at approach: 1       Secured with: tape       Ease of procedure: easy       Dentition: Unchanged and Intact    Medication(s) Administered   Medication Administration Time: 2/10/2025 12:10 PM

## 2025-02-10 NOTE — OP NOTE
UROLOGY OPERATIVE REPORT    PREOP DIAGNOSIS  (N20.0) Dustin Kidney stone  (primary encounter diagnosis)  Comment:   Plan: cephALEXin (KEFLEX) 500 MG capsule               POSTOP DIAGNOSIS  Same    ANESTHESIA  General    PROCEDURE  Procedure(s):  cystoscopy , bilateral ureteroscopy with holmium laser lithotripsy on Right side only,bilateral ureteral stent placement    STAFF  Circulator: Caty Kennedy RN  : Corky Weaver  Relief Circulator: Gema Pal RN  Scrub Person: Anna Reed RN; Cecil Garcia    SURGEON  Surgeon(s):  Ceasar Delgado MD    Findings : could not get to the left kidney given narrow mid ureter     Details of the procedure.  Sapna was brought to the operating room placed in supine position.  After excellent induction of general anesthesia via enteric intubation he perineum was prepped and draped in regular fashion.  22 Slovak cystoscope was placed per her urethra.  Evaluation of the bladder did not demonstrate any pathology.  Attention was brought to the ureteral orifices on both sides I cannulated them using Glidewire's x 2 all the way to the left and the right renal pelvises.  I started with the right side  ureteroscopy was performed all the way to the UPJ; no Obstructing stone in the ureter identified.  Using ureteral access sheath I performed flexible ureteroscopy and calycoscopy where 2 stones were identified in the right kidney.  One of the stones I expected was pushed back from the UPJ area and it was 8 mm in size.  I used 200  m holmium laser fiber to break down both both stones into multiple fragments.  At the completion of procedure 5 x 22 double-J ureteral stent was placed over a Glidewire which appeared in excellent position.  Of note there were no additional stones on the right side.    Attention was brought to the left  side.  I tried to use an rigid ureteroscopy over a second Glidewire but experienced resistance at the mid ureter.  After that I  gently tried to dilate using ureteral access sheath 11/13 but began experiencing difficulty in the same area.  Given suspected stricture at the left mid ureter I decided to put a stent and proceed with ureteroscopy next time.  6 x 24 double-J ureteral stent was placed over the Glidewire on the left side under fluoroscopic guidance.  The bladder was drained patient was transferred to the recovery in stable condition after BNO suppository was placed.    The plan as of now to bring him back for left ureteroscopy holmium laser lithotripsy and left stent exchange.        PLAN      Ceasar Delgado MD

## 2025-02-10 NOTE — ANESTHESIA CARE TRANSFER NOTE
Patient: Sapna Ibrahim    Procedure: Procedure(s):  cystoscopy , bilateral ureteroscopy with holmium laser lithotripsy Right side ,bilateral ureteral stent placement       Diagnosis: Bilateral ureteral calculi [N20.1]  Diagnosis Additional Information: No value filed.    Anesthesia Type:   General     Note:    Oropharynx: oropharynx clear of all foreign objects and spontaneously breathing  Level of Consciousness: awake and drowsy  Oxygen Supplementation: face mask  Level of Supplemental Oxygen (L/min / FiO2): 6  Independent Airway: airway patency satisfactory and stable  Dentition: dentition unchanged  Vital Signs Stable: post-procedure vital signs reviewed and stable  Report to RN Given: handoff report given  Patient transferred to: PACU    Handoff Report: Identifed the Patient, Identified the Reponsible Provider, Reviewed the pertinent medical history, Discussed the surgical course, Reviewed Intra-OP anesthesia mangement and issues during anesthesia, Set expectations for post-procedure period and Allowed opportunity for questions and acknowledgement of understanding    Vitals:  Vitals Value Taken Time   /81 02/10/25 1316   Temp     Pulse 71 02/10/25 1318   Resp 19 02/10/25 1318   SpO2 100 % 02/10/25 1318   Vitals shown include unfiled device data.    Electronically Signed By: FAWN Elizondo CRNA  February 10, 2025  1:19 PM

## 2025-02-10 NOTE — ANESTHESIA PREPROCEDURE EVALUATION
Anesthesia Pre-Procedure Evaluation    Patient: Sapna Ibrahim   MRN: [id number] : 1945        Procedure : Procedure(s):  cystoscopy , bilateral ureteroscopy with holmium laser lithotripsy , possible bilateral ureteral stent placement          Past Medical History:   Diagnosis Date    Allergic rhinitis due to pollen     Elevated coronary artery calcium score     Mixed dyslipidemia       Past Surgical History:   Procedure Laterality Date    CATARACT IOL, RT/LT Bilateral     COLONOSCOPY  2014    hemorrhoids;     COMBINED CYSTOSCOPY, INSERT STENT URETER(S)  12/10/2013    Procedure: COMBINED CYSTOSCOPY, INSERT STENT URETER(S);;  Surgeon: Milton Rangel MD;  Location:  OR    EXTRACORPOREAL SHOCK WAVE LITHOTRIPSY (ESWL)  12/10/2013    Procedure: EXTRACORPOREAL SHOCK WAVE LITHOTRIPSY (ESWL);  RIGHT EXTRACORPOREAL SHOCK WAVE LITHOTRIPSY, CYSTOSCOPY WITH RIGHT STENT PLACEMENT, RIGHT RETROGRADE;  Surgeon: Milton Rangel MD;  Location:  OR    EXTRACORPOREAL SHOCK WAVE LITHOTRIPSY (ESWL)  2014    Procedure: EXTRACORPOREAL SHOCK WAVE LITHOTRIPSY (ESWL);  Surgeon: Milton Rangel MD;  Location:  OR    HC LAPAROSCOPY, SURGICAL; CHOLECYSTECTOMY  1996    open procedure    HC REMOVAL OF TONSILS,12+ Y/O      age 21    HCL PAP SMEAR  1999    normal    RELEASE CARPAL TUNNEL  2015    VASCULAR SURGERY  1994    microvascular decompression of trigeminal nerve (MVD)    [facility] NONSPECIFIC PROCEDURE  1994    microvascular decompression of the right trigeminal nerve    Z VAGINAL HYSTERECTOMY  1983    menorhagia; both ovaries remain    ZZHC COLONOSCOPY THRU STOMA, DIAGNOSTIC  2003    normal; diverticulosis      Allergies   Allergen Reactions    Meperidine Hcl      sick to stomache    Percodan [Aspirin]       Social History     Tobacco Use    Smoking status: Former     Current packs/day: 0.00     Average packs/day: 0.5 packs/day for 20.0 years (10.0 ttl pk-yrs)     Types:  Cigarettes     Start date:      Quit date:      Years since quittin.1     Passive exposure: Never    Smokeless tobacco: Never   Substance Use Topics    Alcohol use: Yes     Comment: once a month      Wt Readings from Last 1 Encounters:   25 49.1 kg (108 lb 3.2 oz)        Anesthesia Evaluation   Pt has had prior anesthetic. Type: General.    No history of anesthetic complications       ROS/MED HX  ENT/Pulmonary:    (-) tobacco use, asthma and sleep apnea   Neurologic:     (+)    no peripheral neuropathy                         (-) no seizures and no CVA   Cardiovascular:     (+) Dyslipidemia - -  CAD (elevated Ca++ score) -  - -                                   (-) hypertension and arrhythmias   METS/Exercise Tolerance: >4 METS    Hematologic:       Musculoskeletal:       GI/Hepatic:    (-) GERD   Renal/Genitourinary:     (+)       Nephrolithiasis ,    (-) renal disease   Endo:    (-) Type II DM and thyroid disease   Psychiatric/Substance Use:       Infectious Disease:    (-) Recent Fever   Malignancy:       Other:            Details    Reading Physician Reading Date Result Priority   Casey Parker MD  [phone] 10/15/2024      Result Text  [id number]  [id number]  [id number]  [id number]^YANY^VERN     [facility]  Echocardiography Laboratory  [address] [place] [address]     Name: ASHER SKELTON  MRN: [id number]  : 1945  Study Date: 10/15/2024 08:31 AM  Age: 79 yrs  Gender: Female  Patient Location: [facility]  Reason For Study: Mixed hyperlipidemia, Elevated coronary artery calcium  score, Co  Ordering Physician: YANY WHITLEY  Referring Physician: YANY WHITLEY  Performed By: Lucho Ryan RDCS     BSA: 1.6 m2  Height: 62 in  Weight: 125 lb  HR: 62  BP: 131/71 mmHg  ______________________________________________________________________________  Procedure  Complete Echo  Adult.  ______________________________________________________________________________  Interpretation Summary     Left ventricular systolic function is normal.  The visual ejection fraction is 55-60%.  The left ventricle is normal in size.  The right ventricle is normal in structure, function and size.  There is mild (1+) aortic regurgitation.     No old studies for comparison                 Procedure: CT CALCIUM SCREENING      Examination Date: 2/6/2023 10:01 AM      Clinical Information: CAD, known or r/o; No acute coronary syndrome;  ASCVD score >= 5%; Mixed hyperlipidemia; Cardiac risk counseling      Ordering Provider: Dr Bianchi     PROCEDURE: High-resolution, ECG synchronized multi-slice computed  tomography was performed without incident. Coronary calcification was  analyzed using Point[employer] calcium scoring software. Scan protocol was  optimized to minimize radiation exposure. The total radiation exposure  was calculated to be 26 DLP and 0.36 mSv.     FINDINGS:     Overall quality of the study: Good.      CORONARY ARTERY CALCIUM SCORES:      Left main coronary artery: 0  Left anterior descending coronary artery: 358  Circumflex coronary artery: 928  Right coronary artery: 278     TOTAL CALCIUM SCORE: 1564     Impressions  Severe coronary artery calcification noted  The total Agatston calcium score is 1564 placing the patient in the  96th percentile when compared to age and gender matched control group.  Ascending and descending thoracic aorta calcification noted  Mild mitral annular calcification  Please review Radiology report for incidental noncardiac findings that  will follow separately              Physical Exam    Airway  airway exam normal      Mallampati: II   TM distance: > 3 FB   Neck ROM: full   Mouth opening: > 3 cm    Respiratory Devices and Support         Dental       (+) Modest Abnormalities - crowns, retainers, 1 or 2 missing teeth      Cardiovascular   cardiovascular exam normal       "    Pulmonary   pulmonary exam normal                OUTSIDE LABS:  CBC:   Lab Results   Component Value Date    WBC 8.5 01/29/2025    WBC 13.5 (H) 01/06/2025    HGB 13.9 01/29/2025    HGB 14.2 01/06/2025    HCT 42.1 01/29/2025    HCT 41.7 01/06/2025     01/29/2025     01/06/2025     BMP:   Lab Results   Component Value Date    .7 01/29/2025     01/06/2025    POTASSIUM 3.30 (A) 01/29/2025    POTASSIUM 3.1 (L) 01/06/2025    CHLORIDE 99.5 01/29/2025    CHLORIDE 97 (L) 01/06/2025    CO2 32.9 (A) 01/29/2025    CO2 25 01/06/2025    BUN 11 01/29/2025    BUN 12 01/29/2025    CR 0.93 01/29/2025    CR 0.98 (H) 01/06/2025     (A) 01/29/2025     (H) 01/06/2025     COAGS: No results found for: \"PTT\", \"INR\", \"FIBR\"  POC: No results found for: \"BGM\", \"HCG\", \"HCGS\"  HEPATIC:   Lab Results   Component Value Date    ALBUMIN 4.1 01/29/2025    PROTTOTAL 6.1 01/29/2025    ALT 21 06/17/2024    AST 25 06/17/2024    ALKPHOS 53 01/29/2025    BILITOTAL 1.0 01/29/2025    BILIDIRECT 0.1 03/28/2009     OTHER:   Lab Results   Component Value Date    PH 5.5 05/13/2010    A1C 5.8 (A) 04/12/2024    FARRAH 9.1 01/29/2025    LIPASE 47 06/17/2024    AMYLASE 53 11/08/2013    TSH 2.10 04/12/2024    T4 1.0 05/25/2011    SED 3 05/31/2007       Anesthesia Plan    ASA Status:  2    NPO Status:  NPO Appropriate    Anesthesia Type: General.     - Airway: ETT   Induction: Intravenous.   Maintenance: Balanced.        Consents    Anesthesia Plan(s) and associated risks, benefits, and realistic alternatives discussed. Questions answered and patient/representative(s) expressed understanding.     - Discussed:     - Discussed with:  Patient            Postoperative Care    Pain management: IV analgesics, Oral pain medications.   PONV prophylaxis: Ondansetron (or other 5HT-3), Dexamethasone or Solumedrol, Background Propofol Infusion     Comments:               Toni Elam MD    I have reviewed the pertinent notes and labs in " the chart from the past 30 days and (re)examined the patient.  Any updates or changes from those notes are reflected in this note.    Clinically Significant Risk Factors Present on Admission

## 2025-02-10 NOTE — DISCHARGE INSTRUCTIONS
Today you were given 975 mg of Tylenol at 11 am. The recommended daily maximum dose is 4000 mg.      Same Day Surgery Discharge Instructions for  Sedation and General Anesthesia     It's not unusual to feel dizzy, light-headed or faint for up to 24 hours after surgery or while taking pain medication.  If you have these symptoms: sit for a few minutes before standing and have someone assist you when you get up to walk or use the bathroom.    You should rest and relax for the next 24 hours. We recommend you make arrangements to have an adult stay with you for at least 24 hours after your discharge.  Avoid hazardous and strenuous activity.    DO NOT DRIVE any vehicle or operate mechanical equipment for 24 hours following the end of your surgery.  Even though you may feel normal, your reactions may be affected by the medication you have received.    Do not drink alcoholic beverages for 24 hours following surgery.     Slowly progress to your regular diet as you feel able. It's not unusual to feel nauseated and/or vomit after receiving anesthesia.  If you develop these symptoms, drink clear liquids (apple juice, ginger ale, broth, 7-up, etc. ) until you feel better.  If your nausea and vomiting persists for 24 hours, please notify your surgeon.      All narcotic pain medications, along with inactivity and anesthesia, can cause constipation. Drinking plenty of liquids and increasing fiber intake will help.    For any questions of a medical nature, call your surgeon.    Do not make important decisions for 24 hours.    If you had general anesthesia, you may have a sore throat for a couple of days related to the breathing tube used during surgery.  You may use Cepacol lozenges to help with this discomfort.  If it worsens or if you develop a fever, contact your surgeon.     If you feel your pain is not well managed with the pain medications prescribed by your surgeon, please contact your surgeon's office to let them know so  they can address your concerns.     Cystoscopy, Holmium Laser with Stent Placement Discharge Instructions    Holmium laser lithotripsy was used to break up your kidney stone(s). It is normal to have visible blood in the urine, burning, urgency and frequency following this procedure. These symptoms may last for a few days to weeks.     Diet:  To help pass stone fragments and clear the blood out of the urine, it is important to drink 6-8 glasses of fluids per day at home - at least 3-4 glasses should be water.     Return to the diet that you were on before the procedure, unless you are given specific diet instructions.    Activity:  Walk short distances and increase as your strength allows.  You may climb stairs.  Do not do strenuous exercise or heavy lifting until approved by surgeon.  Do not drive while taking narcotic pain medications.    Bathing:  You may take a shower.          Stent information    During surgery, a stent was placed in the ureter.  The ureter is the tube that drains urine from the kidney to the bladder.  The stent is placed to dilate (open) the ureter so the stone fragments can pass easily through the ureter or to decrease ureteral swelling after surgery, or to relieve an obstruction. The stent is made of rubber. The upper end of the stent curls in the kidney while the lower end rests in the bladder.    While the stent is in place you may experience the following symptoms:  Blood and/or small blood clots in urine.  Bladder spasm (frequency and urgency of urination).  Discomfort or aching in the back or side where the stent is.  Burning or discomfort at the end of urine stream.    To decrease these symptoms you should:  Take pain medication as prescribed.  Drink plenty of fluids.  If you experience pain at the end of urination try not emptying your bladder completely.  If having discomfort in back or side, decrease activity.    Call your physician if these signs/symptoms are present:  Pain that is  not relieved by a short rest or ordered pain medications.  Temperature at or above 101.0 F or chills.  Inability or difficulty urinating.   Excessive blood in urine.  Any questions or concerns.      **If you have questions or concerns about your procedure,   call Dr. Delgado at [phone]**

## 2025-02-10 NOTE — ANESTHESIA POSTPROCEDURE EVALUATION
Patient: Sapna Ibrahim    Procedure: Procedure(s):  cystoscopy , bilateral ureteroscopy with holmium laser lithotripsy Right side ,bilateral ureteral stent placement       Anesthesia Type:  General    Note:  Disposition: Outpatient   Postop Pain Control: Uneventful            Sign Out: Well controlled pain   PONV: No   Neuro/Psych: Uneventful            Sign Out: Acceptable/Baseline neuro status   Airway/Respiratory: Uneventful            Sign Out: Acceptable/Baseline resp. status   CV/Hemodynamics: Uneventful            Sign Out: Acceptable CV status   Other NRE: NONE   DID A NON-ROUTINE EVENT OCCUR? No           Last vitals:  Vitals Value Taken Time   /85 02/10/25 1415   Temp 36  C (96.8  F) 02/10/25 1415   Pulse 65 02/10/25 1418   Resp 15 02/10/25 1418   SpO2 98 % 02/10/25 1415   Vitals shown include unfiled device data.    Electronically Signed By: Toni Elam MD  February 10, 2025  3:32 PM

## 2025-02-11 RX ORDER — ATROPA BELLADONNA AND OPIUM 16.2; 3 MG/1; MG/1
SUPPOSITORY RECTAL PRN
OUTPATIENT
Start: 2025-02-10

## 2025-02-20 ENCOUNTER — OFFICE VISIT (OUTPATIENT)
Dept: FAMILY MEDICINE | Facility: CLINIC | Age: 80
End: 2025-02-20

## 2025-02-20 VITALS
TEMPERATURE: 97.5 F | BODY MASS INDEX: 19.62 KG/M2 | HEART RATE: 67 BPM | DIASTOLIC BLOOD PRESSURE: 70 MMHG | HEIGHT: 62 IN | OXYGEN SATURATION: 99 % | SYSTOLIC BLOOD PRESSURE: 122 MMHG | WEIGHT: 106.6 LBS

## 2025-02-20 DIAGNOSIS — N20.0 NEPHROLITHIASIS: ICD-10-CM

## 2025-02-20 DIAGNOSIS — Z01.818 PRE-OPERATIVE EXAMINATION: Primary | ICD-10-CM

## 2025-02-20 DIAGNOSIS — N23 RENAL COLIC ON LEFT SIDE: ICD-10-CM

## 2025-02-20 LAB
BUN SERPL-MCNC: 13 MG/DL (ref 7–25)
BUN/CREATININE RATIO: 12 (ref 6–32)
CALCIUM SERPL-MCNC: 9.5 MG/DL (ref 8.6–10.3)
CHLORIDE SERPLBLD-SCNC: 98.7 MMOL/L (ref 98–110)
CO2 SERPL-SCNC: 31.1 MMOL/L (ref 20–32)
CREAT SERPL-MCNC: 1.07 MG/DL (ref 0.6–1.3)
ERYTHROCYTE [DISTWIDTH] IN BLOOD BY AUTOMATED COUNT: 14.4 %
GLUCOSE SERPL-MCNC: 94 MG/DL (ref 60–99)
HCT VFR BLD AUTO: 40.6 % (ref 35–47)
HEMOGLOBIN: 13.3 G/DL (ref 11.7–15.7)
MCH RBC QN AUTO: 30.4 PG (ref 26–33)
MCHC RBC AUTO-ENTMCNC: 32.8 G/DL (ref 31–36)
MCV RBC AUTO: 93 FL (ref 78–100)
PLATELET COUNT - QUEST: 305 10^9/L (ref 150–375)
POTASSIUM SERPL-SCNC: 3.56 MMOL/L (ref 3.5–5.3)
RBC # BLD AUTO: 4.37 10*12/L (ref 3.8–5.2)
SODIUM SERPL-SCNC: 136.2 MMOL/L (ref 135–146)
WBC # BLD AUTO: 9.2 10*9/L (ref 4–11)

## 2025-02-20 NOTE — PROGRESS NOTES
Preoperative Evaluation  [facility]  [address]  [address]  [facility] [address]  Phone: [phone]  Fax: [phone]  Primary Provider: Verena Freedman PA-C  Pre-op Performing Provider: Verena Freedman PA-C  Feb 20, 2025 2/20/2025   Surgical Information   What procedure is being done? CYSTOSCOPY LEFT URETEROSCOPY WITH HOLMIUM LASER LITHOTRIPSY , LEFT URETERAL STENT EXCHANGE VERSUS REMOVAL   Facility or Hospital where procedure/surgery will be performed: [facility]   Who is doing the procedure / surgery? Dede Delgado MD   Date of surgery / procedure: 3/5/25   Time of surgery / procedure: 12:30 pm   Where do you plan to recover after surgery? at home with family     Fax number for surgical facility: Note does not need to be faxed, will be available electronically in Epic.    Assessment & Plan     The proposed surgical procedure is considered INTERMEDIATE risk.    Pre-operative examination  Nephrolithiasis  Renal colic on left side  - VENOUS COLLECTION  - Basic Metabolic Panel (BFP)  - Hemogram Platelet (BFP)       - No identified additional risk factors other than previously addressed    Antiplatelet or Anticoagulation Medication Instructions   - aspirin: Discontinue aspirin 7 days prior to procedure to reduce bleeding risk. It should be resumed postoperatively.     Additional Medication Instructions  Take all scheduled medications on the day of surgery    Recommendation  Approval given to proceed with proposed procedure, without further diagnostic evaluation.    Klever Alejo is a 79 year old, presenting for the following:  Pre-Op Exam (DOS 3/5/25 Cystoscopy at [facility] done by Dr. Delgado)      HPI related to upcoming procedure: Pt underwent cystoscopy, ureteroscopy 2/10/25 where right sided kidney stones were successful broken down with lithotropsy. They were unable to pass through left ureter due  to likely stricture, so plan is to reattempt.           1/29/2025   Pre-Op Questionnaire   Have you ever had a heart attack or stroke? No   Have you ever had surgery on your heart or blood vessels, such as a stent placement, a coronary artery bypass, or surgery on an artery in your head, neck, heart, or legs? No   Do you have chest pain with activity? No   Do you have a history of heart failure? No   Do you currently have a cold, bronchitis or symptoms of other infection? No   Do you have a cough, shortness of breath, or wheezing? No   Do you or anyone in your family have previous history of blood clots? No   Do you or does anyone in your family have a serious bleeding problem such as prolonged bleeding following surgeries or cuts? No   Have you ever had problems with anemia or been told to take iron pills? No   Have you had any abnormal blood loss such as black, tarry or bloody stools, or abnormal vaginal bleeding? No   Have you ever had a blood transfusion? No   Are you willing to have a blood transfusion if it is medically needed before, during, or after your surgery? Yes   Have you or any of your relatives ever had problems with anesthesia? No   Do you have sleep apnea, excessive snoring or daytime drowsiness? No   Do you have any artifical heart valves or other implanted medical devices like a pacemaker, defibrillator, or continuous glucose monitor? No   Do you have artificial joints? No   Are you allergic to latex? No     Health Care Directive  Patient does not have a Health Care Directive: Discussed advance care planning with patient; information given to patient to review.    Preoperative Review of    reviewed - no record of controlled substances prescribed.    Status of Chronic Conditions:  HYPERLIPIDEMIA - Patient has a long history of significant Hyperlipidemia requiring medication for treatment with recent good control. Patient reports no problems or side effects with the medication.     Patient  Active Problem List    Diagnosis Date Noted    Age-related osteoporosis without current pathological fracture 04/22/2024     Priority: Medium    Prediabetes 04/15/2024     Priority: Medium    Elevated coronary artery calcium score 04/11/2023     Priority: Medium    Dermatitis 05/08/2014     Priority: Medium    Calculus of kidney 12/05/2013     Priority: Medium     First episode 10/27/13      Mixed hyperlipidemia      Priority: Medium     Problem list name updated by automated process. Provider to review      Allergic state      Priority: Medium     Problem list name updated by automated process. Provider to review      ACP (advance care planning) 05/25/2011     Priority: Low     Advance Care Planning:   ACP Review and Resources Provided:  Reviewed chart for advance care plan.  Sapna Ibrahim has no plan or code status on file. Discussed available resources and provided with information. Confirmed code status reflects current choices pending further ACP discussions.  Confirmed/documented designated decision maker(s). See permanent comments section of demographics in clinical tab.   Added by Dawna Espinosa on 5/8/2014                Past Medical History:   Diagnosis Date    Allergic rhinitis due to pollen     Elevated coronary artery calcium score     Mixed dyslipidemia      Past Surgical History:   Procedure Laterality Date    CATARACT IOL, RT/LT Bilateral     COLONOSCOPY  09/01/2014    hemorrhoids;     COMBINED CYSTOSCOPY, INSERT STENT URETER(S)  12/10/2013    Procedure: COMBINED CYSTOSCOPY, INSERT STENT URETER(S);;  Surgeon: Milton Rangel MD;  Location:  OR    EXTRACORPOREAL SHOCK WAVE LITHOTRIPSY (ESWL)  12/10/2013    Procedure: EXTRACORPOREAL SHOCK WAVE LITHOTRIPSY (ESWL);  RIGHT EXTRACORPOREAL SHOCK WAVE LITHOTRIPSY, CYSTOSCOPY WITH RIGHT STENT PLACEMENT, RIGHT RETROGRADE;  Surgeon: Milton Rangel MD;  Location:  OR    EXTRACORPOREAL SHOCK WAVE LITHOTRIPSY (ESWL)  05/13/2014    Procedure: EXTRACORPOREAL  SHOCK WAVE LITHOTRIPSY (ESWL);  Surgeon: Milton Rangel MD;  Location: [facility] OR    HC LAPAROSCOPY, SURGICAL; CHOLECYSTECTOMY  1996    open procedure    HC REMOVAL OF TONSILS,12+ Y/O      age 21    HCL PAP SMEAR  1999    normal    LASER HOLMIUM LITHOTRIPSY URETER(S), INSERT STENT, COMBINED Bilateral 2/10/2025    Procedure: cystoscopy , bilateral ureteroscopy with holmium laser lithotripsy Right side ,bilateral ureteral stent placement;  Surgeon: Ceasar Delgado MD;  Location: [facility] OR    RELEASE CARPAL TUNNEL  2015    VASCULAR SURGERY  1994    microvascular decompression of trigeminal nerve (MVD)    Z NONSPECIFIC PROCEDURE  1994    microvascular decompression of the right trigeminal nerve    Z VAGINAL HYSTERECTOMY  1983    menorhagia; both ovaries remain    ZZHC COLONOSCOPY THRU STOMA, DIAGNOSTIC  2003    normal; diverticulosis     Current Outpatient Medications   Medication Sig Dispense Refill    aspirin (ASA) 81 MG chewable tablet Take 1 tablet (81 mg) by mouth daily.      Multiple Vitamins-Minerals (PRESERVISION AREDS) TABS Take by mouth daily      rosuvastatin (CRESTOR) 40 MG tablet Take 1 tablet (40 mg) by mouth daily. 100 tablet 6    Turmeric 400 MG CAPS Take 1 capsule by mouth daily      UNABLE TO FIND Take by mouth daily Elderberry Syrup      Vitamin D3 (CHOLECALCIFEROL) 125 MCG (5000 UT) tablet Take by mouth daily         Allergies   Allergen Reactions    Meperidine Hcl      sick to stomache    Percodan [Aspirin]         Social History     Tobacco Use    Smoking status: Former     Current packs/day: 0.00     Average packs/day: 0.5 packs/day for 20.0 years (10.0 ttl pk-yrs)     Types: Cigarettes     Start date:      Quit date:      Years since quittin.1     Passive exposure: Never    Smokeless tobacco: Never   Substance Use Topics    Alcohol use: Yes     Comment: once a month     Family History   Adopted: Yes   Problem Relation Age of Onset     "Multiple Sclerosis Daughter     Colon Cancer No family hx of     Breast Cancer No family hx of      History   Drug Use Unknown             Review of Systems  Constitutional, neuro, ENT, endocrine, pulmonary, cardiac, gastrointestinal, genitourinary, musculoskeletal, integument and psychiatric systems are negative, except as otherwise noted.    Objective    /70 (BP Location: Right arm, Patient Position: Sitting, Cuff Size: Adult Regular)   Pulse 67   Temp 97.5  F (36.4  C) (Temporal)   Ht 1.562 m (5' 1.5\")   Wt 48.4 kg (106 lb 9.6 oz)   SpO2 99%   BMI 19.82 kg/m     Estimated body mass index is 19.82 kg/m  as calculated from the following:    Height as of this encounter: 1.562 m (5' 1.5\").    Weight as of this encounter: 48.4 kg (106 lb 9.6 oz).  Physical Exam  GENERAL: alert and no distress  EYES: Eyes grossly normal to inspection, PERRL and conjunctivae and sclerae normal  HENT: ear canals and TM's normal, nose and mouth without ulcers or lesions  NECK: no adenopathy, no asymmetry, masses, or scars  RESP: lungs clear to auscultation - no rales, rhonchi or wheezes  CV: regular rate and rhythm, normal S1 S2, no S3 or S4, no murmur, click or rub, no peripheral edema  ABDOMEN: soft, nontender, no hepatosplenomegaly, no masses and bowel sounds normal  MS: no gross musculoskeletal defects noted, no edema  SKIN: no suspicious lesions or rashes  NEURO: Normal strength and tone, mentation intact and speech normal  PSYCH: mentation appears normal, affect normal/bright      Diagnostics  Recent Results (from the past 48 hours)   Basic Metabolic Panel (BFP)    Collection Time: 02/20/25 12:00 AM   Result Value Ref Range    Carbon Dioxide 31.1 20 - 32 mmol/L    Creatinine 1.07 0.60 - 1.30 mg/dL    Glucose 94 60 - 99 mg/dL    Sodium 136.2 135 - 146 mmol/L    Potassium 3.56 3.5 - 5.3 mmol/L    Chloride 98.7 98 - 110 mmol/L    Urea Nitrogen 13 7 - 25 mg/dL    Calcium 9.5 8.6 - 10.3 mg/dL    BUN/Creatinine Ratio 12 6 - 32 "   Hemogram Platelet (BFP)    Collection Time: 02/20/25 11:11 AM   Result Value Ref Range    WBC 9.2 4.0 - 11 10*9/L    RBC Count 4.37 3.8 - 5.2 10*12/L    Hemoglobin 13.3 11.7 - 15.7 g/dL    Hematocrit 40.6 35.0 - 47.0 %    MCV 93.0 78 - 100 fL    MCH 30.4 26 - 33 pg    MCHC 32.8 31 - 36 g/dL    RDW 14.4 %    Platelet Count 305 150 - 375 10^9/L      Cardiac Screening:  Pt had echocardiogram with EKG 10/2024 through cardiology which was reassuring.     Revised Cardiac Risk Index (RCRI)  The patient has the following serious cardiovascular risks for perioperative complications:   - No serious cardiac risks = 0 points     RCRI Interpretation: 1 point: Class II (low risk - 0.9% complication rate)       Signed Electronically by: Verena Freedman PA-C  A copy of this evaluation report is provided to the requesting physician.

## 2025-02-20 NOTE — NURSING NOTE
Chief Complaint   Patient presents with    Pre-Op Exam     DOS 3/5/25 Cystoscopy at [facility] done by Dr. Delgado

## 2025-03-04 ENCOUNTER — ANESTHESIA EVENT (OUTPATIENT)
Dept: SURGERY | Facility: CLINIC | Age: 80
End: 2025-03-04
Payer: COMMERCIAL

## 2025-03-04 ASSESSMENT — ENCOUNTER SYMPTOMS
SEIZURES: 0
DYSRHYTHMIAS: 0

## 2025-03-04 ASSESSMENT — LIFESTYLE VARIABLES: TOBACCO_USE: 0

## 2025-03-04 NOTE — ANESTHESIA PREPROCEDURE EVALUATION
Anesthesia Pre-Procedure Evaluation    Patient: Sapna Ibrahim   MRN: [id number] : 1945        Procedure : Procedure(s):  CYSTOSCOPY LEFT URETEROSCOPY WITH HOLMIUM LASER LITHOTRIPSY , LEFT URETERAL STENT EXCHANGE VERSUS REMOVAL          Past Medical History:   Diagnosis Date     Allergic rhinitis due to pollen      Elevated coronary artery calcium score      Mixed dyslipidemia       Past Surgical History:   Procedure Laterality Date     CATARACT IOL, RT/LT Bilateral      COLONOSCOPY  2014    hemorrhoids;      COMBINED CYSTOSCOPY, INSERT STENT URETER(S)  12/10/2013    Procedure: COMBINED CYSTOSCOPY, INSERT STENT URETER(S);;  Surgeon: Milton Rangel MD;  Location:  OR     EXTRACORPOREAL SHOCK WAVE LITHOTRIPSY (ESWL)  12/10/2013    Procedure: EXTRACORPOREAL SHOCK WAVE LITHOTRIPSY (ESWL);  RIGHT EXTRACORPOREAL SHOCK WAVE LITHOTRIPSY, CYSTOSCOPY WITH RIGHT STENT PLACEMENT, RIGHT RETROGRADE;  Surgeon: Milton Rangel MD;  Location:  OR     EXTRACORPOREAL SHOCK WAVE LITHOTRIPSY (ESWL)  2014    Procedure: EXTRACORPOREAL SHOCK WAVE LITHOTRIPSY (ESWL);  Surgeon: iMlton Rangel MD;  Location:  OR     HC LAPAROSCOPY, SURGICAL; CHOLECYSTECTOMY  1996    open procedure     HC REMOVAL OF TONSILS,12+ Y/O      age 21     HCL PAP SMEAR  1999    normal     LASER HOLMIUM LITHOTRIPSY URETER(S), INSERT STENT, COMBINED Bilateral 2/10/2025    Procedure: cystoscopy , bilateral ureteroscopy with holmium laser lithotripsy Right side ,bilateral ureteral stent placement;  Surgeon: Ceasar Delgado MD;  Location:  OR     RELEASE CARPAL TUNNEL  2015     VASCULAR SURGERY  1994    microvascular decompression of trigeminal nerve (MVD)     [facility] NONSPECIFIC PROCEDURE  1994    microvascular decompression of the right trigeminal nerve     [facility] VAGINAL HYSTERECTOMY  1983    menorhagia; both ovaries remain     ZZ COLONOSCOPY THRU STOMA, DIAGNOSTIC  2003    normal; diverticulosis       Allergies   Allergen Reactions     Meperidine Hcl      sick to stomache     Percodan [Aspirin]       Social History     Tobacco Use     Smoking status: Former     Current packs/day: 0.00     Average packs/day: 0.5 packs/day for 20.0 years (10.0 ttl pk-yrs)     Types: Cigarettes     Start date:      Quit date:      Years since quittin.1     Passive exposure: Never     Smokeless tobacco: Never   Substance Use Topics     Alcohol use: Yes     Comment: once a month      Wt Readings from Last 1 Encounters:   25 48.4 kg (106 lb 9.6 oz)        Anesthesia Evaluation   Pt has had prior anesthetic. Type: General.    No history of anesthetic complications       ROS/MED HX  ENT/Pulmonary:    (-) tobacco use, asthma and sleep apnea   Neurologic:     (+)    no peripheral neuropathy                         (-) no seizures and no CVA   Cardiovascular:     (+) Dyslipidemia - -  CAD (elevated Ca++ score) -  - -                                 Previous cardiac testing   Echo: Date: Results:  natacha: ASHER SKELTON  MRN: [id number]  : 1945  Study Date: 10/15/2024 08:31 AM  Age: 79 yrs  Gender: Female  Patient Location: [facility]  Reason For Study: Mixed hyperlipidemia, Elevated coronary artery calcium  score, Co  Ordering Physician: YANY WHITLEY  Referring Physician: YANY WHITLEY  Performed By: Lucho Ryan RDCS     BSA: 1.6 m2  Height: 62 in  Weight: 125 lb  HR: 62  BP: 131/71 mmHg  ______________________________________________________________________________  Procedure  Complete Echo Adult.  ______________________________________________________________________________  Interpretation Summary     Left ventricular systolic function is normal.  The visual ejection fraction is 55-60%.  The left ventricle is normal in size.  The right ventricle is normal in structure, function and size.  There is mild (1+) aortic regurgitation.    Stress Test:  Date: Results:    ECG Reviewed:  Date:  "Results:    Cath:  Date: Results:   (-) hypertension and arrhythmias   METS/Exercise Tolerance: >4 METS    Hematologic:       Musculoskeletal:       GI/Hepatic:    (-) GERD   Renal/Genitourinary:     (+)       Nephrolithiasis ,    (-) renal disease   Endo:    (-) Type II DM and thyroid disease   Psychiatric/Substance Use:       Infectious Disease:    (-) Recent Fever   Malignancy:       Other:            Physical Exam    Airway        Mallampati: II   TM distance: > 3 FB   Neck ROM: full   Mouth opening: > 3 cm    Respiratory Devices and Support         Dental       (+) Modest Abnormalities - crowns, retainers, 1 or 2 missing teeth      Cardiovascular   cardiovascular exam normal          Pulmonary   pulmonary exam normal            OUTSIDE LABS:  CBC:   Lab Results   Component Value Date    WBC 9.2 02/20/2025    WBC 8.5 01/29/2025    HGB 13.3 02/20/2025    HGB 13.9 01/29/2025    HCT 40.6 02/20/2025    HCT 42.1 01/29/2025     02/20/2025     01/29/2025     BMP:   Lab Results   Component Value Date    .2 02/20/2025    .7 01/29/2025    POTASSIUM 3.56 02/20/2025    POTASSIUM 3.7 02/10/2025    CHLORIDE 98.7 02/20/2025    CHLORIDE 99.5 01/29/2025    CO2 31.1 02/20/2025    CO2 32.9 (A) 01/29/2025    BUN 13 02/20/2025    BUN 12 02/20/2025    CR 1.07 02/20/2025    CR 0.93 01/29/2025    GLC 94 02/20/2025     (A) 01/29/2025     COAGS: No results found for: \"PTT\", \"INR\", \"FIBR\"  POC: No results found for: \"BGM\", \"HCG\", \"HCGS\"  HEPATIC:   Lab Results   Component Value Date    ALBUMIN 4.1 01/29/2025    PROTTOTAL 6.1 01/29/2025    ALT 21 06/17/2024    AST 25 06/17/2024    ALKPHOS 53 01/29/2025    BILITOTAL 1.0 01/29/2025    BILIDIRECT 0.1 03/28/2009     OTHER:   Lab Results   Component Value Date    PH 5.5 05/13/2010    A1C 5.8 (A) 04/12/2024    FARRAH 9.5 02/20/2025    LIPASE 47 06/17/2024    AMYLASE 53 11/08/2013    TSH 2.10 04/12/2024    T4 1.0 05/25/2011    SED 3 05/31/2007       Anesthesia " Plan    ASA Status:  2    NPO Status:  NPO Appropriate    Anesthesia Type: General.     - Airway: LMA   Induction: Intravenous.   Maintenance: Balanced.        Consents    Anesthesia Plan(s) and associated risks, benefits, and realistic alternatives discussed. Questions answered and patient/representative(s) expressed understanding.     - Discussed:     - Discussed with:  Patient            Postoperative Care    Pain management: IV analgesics.   PONV prophylaxis: Ondansetron (or other 5HT-3), Dexamethasone or Solumedrol     Comments:               Azam Ferrer MD    I have reviewed the pertinent notes and labs in the chart from the past 30 days and (re)examined the patient.  Any updates or changes from those notes are reflected in this note.    Clinically Significant Risk Factors Present on Admission

## 2025-03-05 ENCOUNTER — HOSPITAL ENCOUNTER (OUTPATIENT)
Facility: CLINIC | Age: 80
Discharge: HOME | End: 2025-03-05
Attending: UROLOGY | Admitting: UROLOGY
Payer: COMMERCIAL

## 2025-03-05 ENCOUNTER — ANESTHESIA (OUTPATIENT)
Dept: SURGERY | Facility: CLINIC | Age: 80
End: 2025-03-05
Payer: COMMERCIAL

## 2025-03-05 ENCOUNTER — APPOINTMENT (OUTPATIENT)
Dept: GENERAL RADIOLOGY | Facility: CLINIC | Age: 80
End: 2025-03-05
Attending: UROLOGY
Payer: COMMERCIAL

## 2025-03-05 VITALS
HEART RATE: 75 BPM | DIASTOLIC BLOOD PRESSURE: 75 MMHG | BODY MASS INDEX: 19.29 KG/M2 | HEIGHT: 62 IN | SYSTOLIC BLOOD PRESSURE: 146 MMHG | OXYGEN SATURATION: 99 % | WEIGHT: 104.8 LBS | RESPIRATION RATE: 16 BRPM | TEMPERATURE: 97.5 F

## 2025-03-05 DIAGNOSIS — N20.0 KIDNEY STONE: Primary | ICD-10-CM

## 2025-03-05 PROCEDURE — 250N000013 HC RX MED GY IP 250 OP 250 PS 637: Performed by: STUDENT IN AN ORGANIZED HEALTH CARE EDUCATION/TRAINING PROGRAM

## 2025-03-05 PROCEDURE — 710N000009 HC RECOVERY PHASE 1, LEVEL 1, PER MIN: Performed by: UROLOGY

## 2025-03-05 PROCEDURE — 258N000003 HC RX IP 258 OP 636: Performed by: NURSE ANESTHETIST, CERTIFIED REGISTERED

## 2025-03-05 PROCEDURE — C1747 HC OR ENDOSCOPE, SGL USE,URINARY TRACT, IMAGING/ILLUMINATION DEVICE: HCPCS | Performed by: UROLOGY

## 2025-03-05 PROCEDURE — 258N000001 HC RX 258: Performed by: UROLOGY

## 2025-03-05 PROCEDURE — 272N000001 HC OR GENERAL SUPPLY STERILE: Performed by: UROLOGY

## 2025-03-05 PROCEDURE — 250N000011 HC RX IP 250 OP 636: Performed by: NURSE ANESTHETIST, CERTIFIED REGISTERED

## 2025-03-05 PROCEDURE — 360N000076 HC SURGERY LEVEL 3, PER MIN: Performed by: UROLOGY

## 2025-03-05 PROCEDURE — 250N000009 HC RX 250: Performed by: NURSE ANESTHETIST, CERTIFIED REGISTERED

## 2025-03-05 PROCEDURE — 999N000141 HC STATISTIC PRE-PROCEDURE NURSING ASSESSMENT: Performed by: UROLOGY

## 2025-03-05 PROCEDURE — 999N000179 XR SURGERY CARM FLUORO LESS THAN 5 MIN W STILLS

## 2025-03-05 PROCEDURE — 250N000009 HC RX 250: Performed by: UROLOGY

## 2025-03-05 PROCEDURE — C1894 INTRO/SHEATH, NON-LASER: HCPCS | Performed by: UROLOGY

## 2025-03-05 PROCEDURE — 250N000011 HC RX IP 250 OP 636: Performed by: STUDENT IN AN ORGANIZED HEALTH CARE EDUCATION/TRAINING PROGRAM

## 2025-03-05 PROCEDURE — 710N000012 HC RECOVERY PHASE 2, PER MINUTE: Performed by: UROLOGY

## 2025-03-05 PROCEDURE — 370N000017 HC ANESTHESIA TECHNICAL FEE, PER MIN: Performed by: UROLOGY

## 2025-03-05 PROCEDURE — 250N000025 HC SEVOFLURANE, PER MIN: Performed by: UROLOGY

## 2025-03-05 PROCEDURE — C2617 STENT, NON-COR, TEM W/O DEL: HCPCS | Performed by: UROLOGY

## 2025-03-05 PROCEDURE — C1769 GUIDE WIRE: HCPCS | Performed by: UROLOGY

## 2025-03-05 DEVICE — URETERAL STENT WITH SIDE HOLES 6FX24CM
Type: IMPLANTABLE DEVICE | Site: URETER | Status: FUNCTIONAL
Brand: TRIA™ FIRM

## 2025-03-05 RX ORDER — ONDANSETRON 2 MG/ML
4 INJECTION INTRAMUSCULAR; INTRAVENOUS EVERY 30 MIN PRN
Status: DISCONTINUED | OUTPATIENT
Start: 2025-03-05 | End: 2025-03-05 | Stop reason: HOSPADM

## 2025-03-05 RX ORDER — HYDROMORPHONE HCL IN WATER/PF 6 MG/30 ML
0.2 PATIENT CONTROLLED ANALGESIA SYRINGE INTRAVENOUS EVERY 5 MIN PRN
Status: DISCONTINUED | OUTPATIENT
Start: 2025-03-05 | End: 2025-03-05 | Stop reason: HOSPADM

## 2025-03-05 RX ORDER — HYDROMORPHONE HCL IN WATER/PF 6 MG/30 ML
0.4 PATIENT CONTROLLED ANALGESIA SYRINGE INTRAVENOUS EVERY 5 MIN PRN
Status: DISCONTINUED | OUTPATIENT
Start: 2025-03-05 | End: 2025-03-05 | Stop reason: HOSPADM

## 2025-03-05 RX ORDER — NALOXONE HYDROCHLORIDE 0.4 MG/ML
0.1 INJECTION, SOLUTION INTRAMUSCULAR; INTRAVENOUS; SUBCUTANEOUS
Status: DISCONTINUED | OUTPATIENT
Start: 2025-03-05 | End: 2025-03-05 | Stop reason: HOSPADM

## 2025-03-05 RX ORDER — FENTANYL CITRATE 0.05 MG/ML
50 INJECTION, SOLUTION INTRAMUSCULAR; INTRAVENOUS EVERY 5 MIN PRN
Status: DISCONTINUED | OUTPATIENT
Start: 2025-03-05 | End: 2025-03-05 | Stop reason: HOSPADM

## 2025-03-05 RX ORDER — ONDANSETRON 4 MG/1
4 TABLET, ORALLY DISINTEGRATING ORAL EVERY 30 MIN PRN
Status: DISCONTINUED | OUTPATIENT
Start: 2025-03-05 | End: 2025-03-05 | Stop reason: HOSPADM

## 2025-03-05 RX ORDER — PROPOFOL 10 MG/ML
INJECTION, EMULSION INTRAVENOUS PRN
Status: DISCONTINUED | OUTPATIENT
Start: 2025-03-05 | End: 2025-03-05

## 2025-03-05 RX ORDER — ONDANSETRON 2 MG/ML
INJECTION INTRAMUSCULAR; INTRAVENOUS PRN
Status: DISCONTINUED | OUTPATIENT
Start: 2025-03-05 | End: 2025-03-05

## 2025-03-05 RX ORDER — FENTANYL CITRATE 0.05 MG/ML
25 INJECTION, SOLUTION INTRAMUSCULAR; INTRAVENOUS EVERY 5 MIN PRN
Status: DISCONTINUED | OUTPATIENT
Start: 2025-03-05 | End: 2025-03-05 | Stop reason: HOSPADM

## 2025-03-05 RX ORDER — GLYCOPYRROLATE 0.2 MG/ML
INJECTION, SOLUTION INTRAMUSCULAR; INTRAVENOUS PRN
Status: DISCONTINUED | OUTPATIENT
Start: 2025-03-05 | End: 2025-03-05

## 2025-03-05 RX ORDER — SODIUM CHLORIDE, SODIUM LACTATE, POTASSIUM CHLORIDE, CALCIUM CHLORIDE 600; 310; 30; 20 MG/100ML; MG/100ML; MG/100ML; MG/100ML
INJECTION, SOLUTION INTRAVENOUS CONTINUOUS
Status: DISCONTINUED | OUTPATIENT
Start: 2025-03-05 | End: 2025-03-05 | Stop reason: HOSPADM

## 2025-03-05 RX ORDER — FENTANYL CITRATE 50 UG/ML
INJECTION, SOLUTION INTRAMUSCULAR; INTRAVENOUS PRN
Status: DISCONTINUED | OUTPATIENT
Start: 2025-03-05 | End: 2025-03-05

## 2025-03-05 RX ORDER — LIDOCAINE HYDROCHLORIDE 20 MG/ML
INJECTION, SOLUTION INFILTRATION; PERINEURAL PRN
Status: DISCONTINUED | OUTPATIENT
Start: 2025-03-05 | End: 2025-03-05

## 2025-03-05 RX ORDER — DEXAMETHASONE SODIUM PHOSPHATE 4 MG/ML
4 INJECTION, SOLUTION INTRA-ARTICULAR; INTRALESIONAL; INTRAMUSCULAR; INTRAVENOUS; SOFT TISSUE
Status: DISCONTINUED | OUTPATIENT
Start: 2025-03-05 | End: 2025-03-05 | Stop reason: HOSPADM

## 2025-03-05 RX ORDER — DEXMEDETOMIDINE HYDROCHLORIDE 4 UG/ML
INJECTION, SOLUTION INTRAVENOUS PRN
Status: DISCONTINUED | OUTPATIENT
Start: 2025-03-05 | End: 2025-03-05

## 2025-03-05 RX ORDER — DEXAMETHASONE SODIUM PHOSPHATE 4 MG/ML
INJECTION, SOLUTION INTRA-ARTICULAR; INTRALESIONAL; INTRAMUSCULAR; INTRAVENOUS; SOFT TISSUE PRN
Status: DISCONTINUED | OUTPATIENT
Start: 2025-03-05 | End: 2025-03-05

## 2025-03-05 RX ORDER — ACETAMINOPHEN 325 MG/1
975 TABLET ORAL ONCE
Status: COMPLETED | OUTPATIENT
Start: 2025-03-05 | End: 2025-03-05

## 2025-03-05 RX ORDER — CEPHALEXIN 500 MG/1
500 CAPSULE ORAL 2 TIMES DAILY
Qty: 2 CAPSULE | Refills: 0 | Status: SHIPPED | OUTPATIENT
Start: 2025-03-05 | End: 2025-03-06

## 2025-03-05 RX ORDER — MAGNESIUM HYDROXIDE 1200 MG/15ML
LIQUID ORAL PRN
Status: DISCONTINUED | OUTPATIENT
Start: 2025-03-05 | End: 2025-03-05 | Stop reason: HOSPADM

## 2025-03-05 RX ORDER — ACETAMINOPHEN 650 MG/1
650 SUPPOSITORY RECTAL ONCE
Status: COMPLETED | OUTPATIENT
Start: 2025-03-05 | End: 2025-03-05

## 2025-03-05 RX ORDER — CEFAZOLIN SODIUM/WATER 2 G/20 ML
2 SYRINGE (ML) INTRAVENOUS
Status: COMPLETED | OUTPATIENT
Start: 2025-03-05 | End: 2025-03-05

## 2025-03-05 RX ORDER — ATROPA BELLADONNA AND OPIUM 16.2; 3 MG/1; MG/1
SUPPOSITORY RECTAL PRN
Status: DISCONTINUED | OUTPATIENT
Start: 2025-03-05 | End: 2025-03-05 | Stop reason: HOSPADM

## 2025-03-05 RX ORDER — SODIUM CHLORIDE, SODIUM LACTATE, POTASSIUM CHLORIDE, CALCIUM CHLORIDE 600; 310; 30; 20 MG/100ML; MG/100ML; MG/100ML; MG/100ML
INJECTION, SOLUTION INTRAVENOUS CONTINUOUS PRN
Status: DISCONTINUED | OUTPATIENT
Start: 2025-03-05 | End: 2025-03-05

## 2025-03-05 RX ORDER — CEFAZOLIN SODIUM/WATER 2 G/20 ML
2 SYRINGE (ML) INTRAVENOUS SEE ADMIN INSTRUCTIONS
Status: DISCONTINUED | OUTPATIENT
Start: 2025-03-05 | End: 2025-03-05 | Stop reason: HOSPADM

## 2025-03-05 RX ADMIN — PHENYLEPHRINE HYDROCHLORIDE 200 MCG: 10 INJECTION INTRAVENOUS at 12:43

## 2025-03-05 RX ADMIN — PROPOFOL 50 MG: 10 INJECTION, EMULSION INTRAVENOUS at 13:08

## 2025-03-05 RX ADMIN — PHENYLEPHRINE HYDROCHLORIDE 0.5 MCG/KG/MIN: 10 INJECTION INTRAVENOUS at 12:43

## 2025-03-05 RX ADMIN — FENTANYL CITRATE 50 MCG: 50 INJECTION INTRAMUSCULAR; INTRAVENOUS at 13:01

## 2025-03-05 RX ADMIN — DEXMEDETOMIDINE HYDROCHLORIDE 12 MCG: 200 INJECTION INTRAVENOUS at 13:10

## 2025-03-05 RX ADMIN — FENTANYL CITRATE 50 MCG: 50 INJECTION INTRAMUSCULAR; INTRAVENOUS at 12:35

## 2025-03-05 RX ADMIN — PHENYLEPHRINE HYDROCHLORIDE 200 MCG: 10 INJECTION INTRAVENOUS at 12:46

## 2025-03-05 RX ADMIN — LIDOCAINE HYDROCHLORIDE 100 MG: 20 INJECTION, SOLUTION INFILTRATION; PERINEURAL at 12:38

## 2025-03-05 RX ADMIN — PROPOFOL 150 MG: 10 INJECTION, EMULSION INTRAVENOUS at 12:38

## 2025-03-05 RX ADMIN — GLYCOPYRROLATE 0.2 MG: 0.2 INJECTION, SOLUTION INTRAMUSCULAR; INTRAVENOUS at 12:55

## 2025-03-05 RX ADMIN — DEXAMETHASONE SODIUM PHOSPHATE 4 MG: 4 INJECTION, SOLUTION INTRA-ARTICULAR; INTRALESIONAL; INTRAMUSCULAR; INTRAVENOUS; SOFT TISSUE at 12:43

## 2025-03-05 RX ADMIN — ACETAMINOPHEN 975 MG: 325 TABLET, FILM COATED ORAL at 11:11

## 2025-03-05 RX ADMIN — SODIUM CHLORIDE, POTASSIUM CHLORIDE, SODIUM LACTATE AND CALCIUM CHLORIDE: 600; 310; 30; 20 INJECTION, SOLUTION INTRAVENOUS at 12:10

## 2025-03-05 RX ADMIN — Medication 2 G: at 12:30

## 2025-03-05 RX ADMIN — ONDANSETRON 4 MG: 2 INJECTION INTRAMUSCULAR; INTRAVENOUS at 13:14

## 2025-03-05 ASSESSMENT — ACTIVITIES OF DAILY LIVING (ADL)
ADLS_ACUITY_SCORE: 41

## 2025-03-05 NOTE — ANESTHESIA CARE TRANSFER NOTE
Patient: Sapna Ibrahim    Procedure: Procedure(s):  CYSTOSCOPY, WITH LEFT RETROGRADE PYELOGRAM,  LEFT URETEROSCOPY WITH HOLMIUM LASER LITHOTRIPSY , LEFT URETERAL STENT EXCHANGE, RIGHT STENT REMOVAL       Diagnosis: Renal stone [N20.0]  Diagnosis Additional Information: No value filed.    Anesthesia Type:   General     Note:    Oropharynx: oropharynx clear of all foreign objects and spontaneously breathing  Level of Consciousness: drowsy  Oxygen Supplementation: face mask  Level of Supplemental Oxygen (L/min / FiO2): 6  Independent Airway: airway patency satisfactory and stable  Dentition: dentition unchanged  Vital Signs Stable: post-procedure vital signs reviewed and stable  Report to RN Given: handoff report given  Patient transferred to: PACU  Comments: Patient comfortable  Handoff Report: Identifed the Patient, Identified the Reponsible Provider, Reviewed the pertinent medical history, Discussed the surgical course, Reviewed Intra-OP anesthesia mangement and issues during anesthesia, Set expectations for post-procedure period and Allowed opportunity for questions and acknowledgement of understanding      Vitals:  Vitals Value Taken Time   BP     Temp     Pulse     Resp     SpO2         Electronically Signed By: FAWN Jeter CRNA  March 5, 2025  1:36 PM

## 2025-03-05 NOTE — OP NOTE
UROLOGY OPERATIVE REPORT    PREOP DIAGNOSIS  (N20.0) 7 mm left Kidney stone  (primary encounter diagnosis)  Comment:   Plan: cephALEXin (KEFLEX) 500 MG capsule                POSTOP DIAGNOSIS  Same    ANESTHESIA  General    PROCEDURE  Procedure(s):  CYSTOSCOPY, WITH LEFT RETROGRADE PYELOGRAM,  LEFT URETEROSCOPY WITH HOLMIUM LASER LITHOTRIPSY , LEFT URETERAL STENT EXCHANGE, RIGHT STENT REMOVAL    STAFF  Circulator: Gaston Waldron RN  : Srinath Pulliam  Relief Circulator: Gema Pal RN  Scrub Person: Essie Wall    SURGEON  Surgeon(s):  Ceasar Delgado MD      FINDINGS  7 mm stone in the left kidney     EBL  None   Specimen: none     TECHNIQUE     INDICATIONS FOR THE PROCEDURE:   Laurence is a 79-year-old female with a history of left kidney stone presents for the above procedure.   After obtaining consent and going over risks and benefits of the surgery she was brought to the operating room and positioned on the surgical table.  After excellent reduction of general anesthesia via LMA he perineum was prepped and draped in the regular fashion.  22 German cystoscope was placed per urethra.  Evaluation of the bladder did not demonstrate any pathology.  Right stent was removed.  Glidewire x 2 were placed all the way to the left renal pelvis under fluoroscopic guidance.  Left stent was removed.    Left flexible  ureteroscopy was performed through an access ureteral sheath.  7 mm stone was visualized in the midpole of the left kidney.  Using 200  m holmium laser fiber the stone was broken down into multiple fragments.  No additional stones were identified during calycoscopy.  6 x 24 double-J ureteral stent was placed over a Glidewire which appears in good position under fluoroscopy.    The plan as of now to see her back in 2 to 3-week interval to remove the stent as a second stage procedure.    Ceasar Delgado MD

## 2025-03-05 NOTE — DISCHARGE INSTRUCTIONS
Today you were given 975 mg of Tylenol at 11:10am. The recommended daily maximum dose is 4000 mg.     Same Day Surgery Discharge Instructions for  Sedation and General Anesthesia     It's not unusual to feel dizzy, light-headed or faint for up to 24 hours after surgery or while taking pain medication.  If you have these symptoms: sit for a few minutes before standing and have someone assist you when you get up to walk or use the bathroom.    You should rest and relax for the next 24 hours. We recommend you make arrangements to have an adult stay with you for at least 24 hours after your discharge.  Avoid hazardous and strenuous activity.    DO NOT DRIVE any vehicle or operate mechanical equipment for 24 hours following the end of your surgery.  Even though you may feel normal, your reactions may be affected by the medication you have received.    Do not drink alcoholic beverages for 24 hours following surgery.     Slowly progress to your regular diet as you feel able. It's not unusual to feel nauseated and/or vomit after receiving anesthesia.  If you develop these symptoms, drink clear liquids (apple juice, ginger ale, broth, 7-up, etc. ) until you feel better.  If your nausea and vomiting persists for 24 hours, please notify your surgeon.      All narcotic pain medications, along with inactivity and anesthesia, can cause constipation. Drinking plenty of liquids and increasing fiber intake will help.    For any questions of a medical nature, call your surgeon.    Do not make important decisions for 24 hours.    If you had general anesthesia, you may have a sore throat for a couple of days related to the breathing tube used during surgery.  You may use Cepacol lozenges to help with this discomfort.  If it worsens or if you develop a fever, contact your surgeon.     If you feel your pain is not well managed with the pain medications prescribed by your surgeon, please contact your surgeon's office to let them know so  they can address your concerns.   Cystoscopy, Holmium Laser with Stent Placement Discharge Instructions    Holmium laser lithotripsy was used to break up your kidney stone(s). It is normal to have visible blood in the urine, burning, urgency and frequency following this procedure. These symptoms may last for a few days to weeks.     Diet:  To help pass stone fragments and clear the blood out of the urine, it is important to drink 6-8 glasses of fluids per day at home - at least 3-4 glasses should be water.     Return to the diet that you were on before the procedure, unless you are given specific diet instructions.    Activity:  Walk short distances and increase as your strength allows.  You may climb stairs.  Do not do strenuous exercise or heavy lifting until approved by surgeon.  Do not drive while taking narcotic pain medications.    Bathing:  You may take a shower.          Stent information    During surgery, a stent was placed in the ureter.  The ureter is the tube that drains urine from the kidney to the bladder.  The stent is placed to dilate (open) the ureter so the stone fragments can pass easily through the ureter or to decrease ureteral swelling after surgery, or to relieve an obstruction. The stent is made of rubber. The upper end of the stent curls in the kidney while the lower end rests in the bladder.    While the stent is in place you may experience the following symptoms:  Blood and/or small blood clots in urine.  Bladder spasm (frequency and urgency of urination).  Discomfort or aching in the back or side where the stent is.  Burning or discomfort at the end of urine stream.    To decrease these symptoms you should:  Take pain medication as prescribed.  Drink plenty of fluids.  If you experience pain at the end of urination try not emptying your bladder completely.  If having discomfort in back or side, decrease activity.    Call your physician if these signs/symptoms are present:  Pain that is  not relieved by a short rest or ordered pain medications.  Temperature at or above 101.0 F or chills.  Inability or difficulty urinating.   Excessive blood in urine.  Any questions or concerns.    **If you have questions or concerns about your procedure,   call Dr. Delgado at [phone]**

## 2025-03-05 NOTE — ANESTHESIA PROCEDURE NOTES
Airway       Patient location during procedure: OR  Staff -        CRNA: Alta Ugarte APRN CRNA       Performed By: CRNA  Consent for Airway        Urgency: elective  Indications and Patient Condition       Indications for airway management: rayray-procedural       Induction type:intravenous       Mask difficulty assessment: 0 - not attempted    Final Airway Details       Final airway type: supraglottic airway    Supraglottic Airway Details        Type: LMA       Brand: I-Gel       LMA size: 4    Post intubation assessment        Placement verified by: capnometry, equal breath sounds and chest rise        Number of attempts at approach: 1       Ease of procedure: easy       Dentition: Intact and Unchanged

## 2025-03-05 NOTE — ANESTHESIA POSTPROCEDURE EVALUATION
Patient: Sapna Ibrahim    Procedure: Procedure(s):  CYSTOSCOPY, WITH LEFT RETROGRADE PYELOGRAM,  LEFT URETEROSCOPY WITH HOLMIUM LASER LITHOTRIPSY , LEFT URETERAL STENT EXCHANGE, RIGHT STENT REMOVAL       Anesthesia Type:  General    Note:  Disposition: Outpatient   Postop Pain Control: Uneventful            Sign Out: Well controlled pain   PONV: No   Neuro/Psych: Uneventful            Sign Out: Acceptable/Baseline neuro status   Airway/Respiratory: Uneventful            Sign Out: Acceptable/Baseline resp. status   CV/Hemodynamics: Uneventful            Sign Out: Acceptable CV status; No obvious hypovolemia; No obvious fluid overload   Other NRE: NONE   DID A NON-ROUTINE EVENT OCCUR? No       Last vitals:  Vitals Value Taken Time   /71 03/05/25 1430   Temp 36.4  C (97.6  F) 03/05/25 1400   Pulse 84 03/05/25 1443   Resp 15 03/05/25 1443   SpO2 93 % 03/05/25 1442   Vitals shown include unfiled device data.    Electronically Signed By: Azam Ferrer MD  March 5, 2025  2:53 PM

## 2025-03-26 ENCOUNTER — TRANSFERRED RECORDS (OUTPATIENT)
Dept: FAMILY MEDICINE | Facility: CLINIC | Age: 80
End: 2025-03-26

## 2025-04-17 ENCOUNTER — OFFICE VISIT (OUTPATIENT)
Dept: FAMILY MEDICINE | Facility: CLINIC | Age: 80
End: 2025-04-17

## 2025-04-17 VITALS
HEIGHT: 62 IN | OXYGEN SATURATION: 99 % | DIASTOLIC BLOOD PRESSURE: 74 MMHG | BODY MASS INDEX: 20.87 KG/M2 | TEMPERATURE: 97.3 F | HEART RATE: 65 BPM | WEIGHT: 113.4 LBS | SYSTOLIC BLOOD PRESSURE: 140 MMHG

## 2025-04-17 DIAGNOSIS — Z00.00 ENCOUNTER FOR GENERAL HEALTH EXAMINATION: Primary | ICD-10-CM

## 2025-04-17 DIAGNOSIS — E78.2 MIXED HYPERLIPIDEMIA: ICD-10-CM

## 2025-04-17 DIAGNOSIS — R73.03 PREDIABETES: ICD-10-CM

## 2025-04-17 DIAGNOSIS — Z13.228 SCREENING FOR METABOLIC DISORDER: ICD-10-CM

## 2025-04-17 DIAGNOSIS — Z00.00 MEDICARE ANNUAL WELLNESS VISIT, SUBSEQUENT: ICD-10-CM

## 2025-04-17 LAB
ALBUMIN SERPL-MCNC: 4.3 G/DL (ref 3.6–5.1)
ALP SERPL-CCNC: 47 U/L (ref 33–130)
ALT 1742-6: 6 U/L (ref 0–32)
AST 1920-8: 17 U/L (ref 0–35)
BILIRUB SERPL-MCNC: 1.1 MG/DL (ref 0.2–1.2)
BUN SERPL-MCNC: 13 MG/DL (ref 7–25)
BUN/CREATININE RATIO: 13 (ref 6–32)
CALCIUM SERPL-MCNC: 9.4 MG/DL (ref 8.6–10.3)
CHLORIDE SERPLBLD-SCNC: 104.3 MMOL/L (ref 98–110)
CHOLEST SERPL-MCNC: 162 MG/DL (ref 0–199)
CHOLEST/HDLC SERPL: 2 {RATIO} (ref 0–5)
CO2 SERPL-SCNC: 29.7 MMOL/L (ref 20–32)
CREAT SERPL-MCNC: 1.03 MG/DL (ref 0.6–1.3)
GLUCOSE SERPL-MCNC: 94 MG/DL (ref 60–99)
HDLC SERPL-MCNC: 75 MG/DL (ref 40–150)
HEMOGLOBIN A1C: 5.4 % (ref 4–5.6)
LDLC SERPL CALC-MCNC: 57 MG/DL (ref 0–129)
POTASSIUM SERPL-SCNC: 3.89 MMOL/L (ref 3.5–5.3)
PROT SERPL-MCNC: 6.9 G/DL (ref 6.1–8.1)
SODIUM SERPL-SCNC: 135.6 MMOL/L (ref 135–146)
TRIGL SERPL-MCNC: 150 MG/DL (ref 0–149)

## 2025-04-17 NOTE — PROGRESS NOTES
Chief Complaint:  Physical Exam    SUBJECTIVE:   Sapna Ibrahim is a 80 year old female presents for routine health maintenance.    Current concerns:   Hyperlipidemia, Elevated coronary calcium score:  Stable on rosuvastatin 40 mg daily. No side effects. Had been seeing cardiology as recently as 11/2024. They had recommended discharge from cardiology and further refills and management from PCP unless concerns. No chest pain, palpitations, SOB.     Menses are absent    No LMP recorded. Patient has had a hysterectomy.    Was last Pap smear normal: Yes  Due for mammogram:  No    Body mass index is 20.74 kg/m .    Present exercise habits:  5-7 times/week, Walks every day in the summer, walks hallways, elliptical in winter.   Present dietary habits:  eats regular meals and follows a balanced nutrition diet    Calcium intake:  1-2 servings/daily, will increase to 2-3 servings daily  Vit D intake: is taking supplement    Is the patient a smoker? No  If yes, smoking cessation advised and counseling provided.     Cardiovascular risk factors: previous smoker and lipids    Over the past few weeks, have you felt down or depressed? Little interest or pleasure in doing things? No concerns.     If in a relationship are there any Domestic violence concern: No    Last dental appointment:  this year  Last optical appointment:  this year    Was the patient born between 2097-3160 and has not had Hep C testing?  Has not been tested, declines testing    I have reviewed the following histories: Past Medical History, Past Surgical History, Social History, Family History, Problem List, Medication List and Allergies    Past Medical History:   Diagnosis Date    Allergic rhinitis due to pollen     Elevated coronary artery calcium score     Mixed dyslipidemia      Family History   Adopted: Yes   Problem Relation Age of Onset    Multiple Sclerosis Daughter     Colon Cancer No family hx of     Breast Cancer No family hx of      Social History      Socioeconomic History    Marital status:      Spouse name: Not on file    Number of children: 2    Years of education: Not on file    Highest education level: Not on file   Occupational History    Not on file   Tobacco Use    Smoking status: Former     Current packs/day: 0.00     Average packs/day: 0.5 packs/day for 20.0 years (10.0 ttl pk-yrs)     Types: Cigarettes     Start date:      Quit date:      Years since quittin.3     Passive exposure: Never    Smokeless tobacco: Never   Vaping Use    Vaping status: Never Used   Substance and Sexual Activity    Alcohol use: Yes     Alcohol/week: 1.0 - 2.0 standard drink of alcohol     Types: 1 - 2 Standard drinks or equivalent per week    Drug use: Never    Sexual activity: Yes     Partners: Male     Birth control/protection: Post-menopausal   Other Topics Concern     Service Not Asked    Blood Transfusions Not Asked    Caffeine Concern Not Asked    Occupational Exposure Not Asked    Hobby Hazards Not Asked    Sleep Concern Not Asked    Stress Concern Not Asked    Weight Concern Not Asked    Special Diet No    Back Care Not Asked    Exercise Yes     Comment: walking everyday    Bike Helmet Not Asked    Seat Belt Not Asked    Self-Exams Not Asked    Parent/sibling w/ CABG, MI or angioplasty before 65F 55M? Not Asked   Social History Narrative    Not on file     Social Drivers of Health     Financial Resource Strain: Not on file   Food Insecurity: Not on file   Transportation Needs: Not on file   Physical Activity: Not on file   Stress: Not on file   Social Connections: Not on file   Interpersonal Safety: Low Risk  (3/5/2025)    Interpersonal Safety     Do you feel physically and emotionally safe where you currently live?: Yes     Within the past 12 months, have you been hit, slapped, kicked or otherwise physically hurt by someone?: No     Within the past 12 months, have you been humiliated or emotionally abused in other ways by your partner  or ex-partner?: No   Housing Stability: Not on file     Past Surgical History:   Procedure Laterality Date    CATARACT IOL, RT/LT Bilateral     COLONOSCOPY  09/01/2014    hemorrhoids;     COMBINED CYSTOSCOPY, INSERT STENT URETER(S)  12/10/2013    Procedure: COMBINED CYSTOSCOPY, INSERT STENT URETER(S);;  Surgeon: Milton Rangel MD;  Location:  OR    EXTRACORPOREAL SHOCK WAVE LITHOTRIPSY (ESWL)  12/10/2013    Procedure: EXTRACORPOREAL SHOCK WAVE LITHOTRIPSY (ESWL);  RIGHT EXTRACORPOREAL SHOCK WAVE LITHOTRIPSY, CYSTOSCOPY WITH RIGHT STENT PLACEMENT, RIGHT RETROGRADE;  Surgeon: Milton Rangel MD;  Location:  OR    EXTRACORPOREAL SHOCK WAVE LITHOTRIPSY (ESWL)  05/13/2014    Procedure: EXTRACORPOREAL SHOCK WAVE LITHOTRIPSY (ESWL);  Surgeon: Milton aRngel MD;  Location:  OR    HC LAPAROSCOPY, SURGICAL; CHOLECYSTECTOMY  01/01/1996    open procedure    HC REMOVAL OF TONSILS,12+ Y/O      age 21    HCL PAP SMEAR  01/01/1999    normal    LASER HOLMIUM LITHOTRIPSY URETER(S), INSERT STENT, COMBINED Bilateral 02/10/2025    Procedure: cystoscopy , bilateral ureteroscopy with holmium laser lithotripsy Right side ,bilateral ureteral stent placement;  Surgeon: Cesaar Delgado MD;  Location:  OR    LASER HOLMIUM LITHOTRIPSY URETER(S), INSERT STENT, COMBINED Left 3/5/2025    Procedure: CYSTOSCOPY, WITH LEFT RETROGRADE PYELOGRAM,  LEFT URETEROSCOPY WITH HOLMIUM LASER LITHOTRIPSY , LEFT URETERAL STENT EXCHANGE, RIGHT STENT REMOVAL;  Surgeon: Ceasar Delgado MD;  Location:  OR    RELEASE CARPAL TUNNEL  12/01/2015    VASCULAR SURGERY  01/01/1994    microvascular decompression of trigeminal nerve (MVD)    [facility] NONSPECIFIC PROCEDURE  01/01/1994    microvascular decompression of the right trigeminal nerve    [facility] VAGINAL HYSTERECTOMY  01/01/1983    menorhagia; both ovaries remain    [facility] COLONOSCOPY THRU STOMA, DIAGNOSTIC  07/08/2003    normal; diverticulosis       ROS:  E/M: NEGATIVE for ear, nose, mouth and throat  problems  R: NEGATIVE for significant/chronic cough or SOB  CV: NEGATIVE for chest pain or palpitations  GI: NEGATIVE for abdominal pain, chronic diarrhea or constipation  :  NEGATIVE for dysuria, hematuria or vaginal discharge. No sexual health concerns.       Current Outpatient Medications   Medication Sig Dispense Refill    aspirin (ASA) 81 MG chewable tablet Take 1 tablet (81 mg) by mouth daily.      Multiple Vitamins-Minerals (PRESERVISION AREDS) TABS Take by mouth daily      rosuvastatin (CRESTOR) 40 MG tablet Take 1 tablet (40 mg) by mouth daily. 100 tablet 6    Turmeric 400 MG CAPS Take 1 capsule by mouth daily      UNABLE TO FIND Take by mouth daily Elderberry Syrup      Vitamin D3 (CHOLECALCIFEROL) 125 MCG (5000 UT) tablet Take by mouth daily       No current facility-administered medications for this visit.     Facility-Administered Medications Ordered in Other Visits   Medication Dose Route Frequency Provider Last Rate Last Admin    opium-belladonna (B&O SUPPRETTES) 30-16.2 MG per suppository    PRN Ceasar Delgado MD   30 mg at 02/10/25 1257       Patient Active Problem List    Diagnosis Date Noted    Age-related osteoporosis without current pathological fracture 04/22/2024     Priority: Medium    Prediabetes 04/15/2024     Priority: Medium    Elevated coronary artery calcium score 04/11/2023     Priority: Medium    Dermatitis 05/08/2014     Priority: Medium    Calculus of kidney 12/05/2013     Priority: Medium     First episode 10/27/13      Mixed hyperlipidemia      Priority: Medium     Problem list name updated by automated process. Provider to review      Allergic state      Priority: Medium     Problem list name updated by automated process. Provider to review      ACP (advance care planning) 05/25/2011     Priority: Low     Advance Care Planning:   ACP Review and Resources Provided:  Reviewed chart for advance care plan.  Sapna Ibrahim has no plan or code status on file. Discussed  "available resources and provided with information. Confirmed code status reflects current choices pending further ACP discussions.  Confirmed/documented designated decision maker(s). See permanent comments section of demographics in clinical tab.   Added by Dawna Espinosa on 5/8/2014               OBJECTIVE:  BP (!) 140/74 (BP Location: Left arm, Patient Position: Sitting, Cuff Size: Adult Regular)   Pulse 65   Temp 97.3  F (36.3  C) (Temporal)   Ht 1.575 m (5' 2\")   Wt 51.4 kg (113 lb 6.4 oz)   SpO2 99%   BMI 20.74 kg/m      General: 80 year old female who appears her stated age. Vital signs noted.  Head: Normocephalic  Eyes: pupils equal round reactive to light and accomodation, extra ocular movements intact  Ears: external canals and TMs free of abnormalities  Nose: patent, without mucosal abnormalities  Mouth and throat: without erythema or lesions of the mucosa  Neck: supple, without adenopathy or thyromegaly  Lungs: clear to auscultation, no wheezing or crackles  Breasts: Declined. Concerns.   Cv: regular rate and rhythm, normal s1 and s2 without murmur or click  Abd: soft, non-tender, no masses, no hepatomegaly or splenomegaly.   (female): Declined. No concerns.  Ms: normal muscle tone & symmetry  Skin: clear to inspection and with no palpable abnormalities.  Neuro: sensation and motor function grossly intact; cranial nerves without obvious abnormalities.    ASSESSMENT/PLAN:    Encounter for general health examination  Sapna is doing well today. Will update fasting labs. Not needing refills of statin today, but would refill as needed for 1 year.     Mixed hyperlipidemia  - VENOUS COLLECTION  - Comprehensive Metobolic Panel ([facility])  - Lipid Panel ([facility])    Prediabetes  - VENOUS COLLECTION  - HEMOGLOBIN A1C ([facility])    Screening for metabolic disorder  - VENOUS COLLECTION  - Comprehensive Metobolic Panel ([facility])     reports that she quit smoking about 35 years ago. Her smoking use included cigarettes. She " "started smoking about 50 years ago. She has a 10 pack-year smoking history. She has never been exposed to tobacco smoke. She has never used smokeless tobacco.    Estimated body mass index is 20.74 kg/m  as calculated from the following:    Height as of this encounter: 1.575 m (5' 2\").    Weight as of this encounter: 51.4 kg (113 lb 6.4 oz).    Labs pending:      Fasting glucose      Fasting lipids  Meds Suggested:      Vitamin D       Calcium  Tests Recommended:      Regular Dental Examinations        Eye exam        Mammogram yearly  Behavior Modifications:       Cardiovascular exercise 3 times per week--enough to get your Target Heart rate  Other recommendations:     BMI noted and discussed      Regular breast exam     Encouraged My Chart    Counseling Resources:  ATP IV Guidelines  Pooled Cohorts Equation Calculator  Breast Cancer Risk Calculator  FRAX Risk Assessment  ICSI Preventive Guidelines  Dietary Guidelines for Americans, 2010  USDA's MyPlate      Verena Freedman PA-C  4/17/2025    "

## 2025-04-17 NOTE — NURSING NOTE
Chief Complaint   Patient presents with    Physical     CPX fasting     Medicare Visit     AWV     Pre-visit Screening:  Immunizations:  up to date  Colonoscopy:  is up to date  Mammogram: is up to date  Asthma Action Test/Plan:  na  PHQ9:  phq2 given  GAD7:  na  Questioned patient about current smoking habits Pt. quit smoking some time ago.  Ok to leave detailed message on voice mail for today's visit only yes, phone # [phone] (home)

## 2025-04-17 NOTE — PROGRESS NOTES
Sapna Ibrahim is a 80 year old female who presents for Medicare Annual Wellness Visit.    Current providers caring for this patient include:  Patient Care Team:  Verena Freedman PA-C as PCP - General (Family Medicine)  Verena Freedman PA-C as Assigned PCP  Melvin Bianchi MD as Assigned Heart and Vascular Provider  Ever Silver APRN CNP as Nurse Practitioner (Urology)  Preston Izquierdo MD as Assigned Surgical Provider    Complete Medical and Social history reviewed with patient, outlined below.    Patient Active Problem List   Diagnosis    Mixed hyperlipidemia    Allergic state    ACP (advance care planning)    Calculus of kidney    Dermatitis    Elevated coronary artery calcium score    Prediabetes    Age-related osteoporosis without current pathological fracture       Past Medical History:   Diagnosis Date    Allergic rhinitis due to pollen     Elevated coronary artery calcium score     Mixed dyslipidemia        Past Surgical History:   Procedure Laterality Date    CATARACT IOL, RT/LT Bilateral     COLONOSCOPY  09/01/2014    hemorrhoids;     COMBINED CYSTOSCOPY, INSERT STENT URETER(S)  12/10/2013    Procedure: COMBINED CYSTOSCOPY, INSERT STENT URETER(S);;  Surgeon: Milton Rangel MD;  Location:  OR    EXTRACORPOREAL SHOCK WAVE LITHOTRIPSY (ESWL)  12/10/2013    Procedure: EXTRACORPOREAL SHOCK WAVE LITHOTRIPSY (ESWL);  RIGHT EXTRACORPOREAL SHOCK WAVE LITHOTRIPSY, CYSTOSCOPY WITH RIGHT STENT PLACEMENT, RIGHT RETROGRADE;  Surgeon: Milton Rangel MD;  Location:  OR    EXTRACORPOREAL SHOCK WAVE LITHOTRIPSY (ESWL)  05/13/2014    Procedure: EXTRACORPOREAL SHOCK WAVE LITHOTRIPSY (ESWL);  Surgeon: Milton Rangel MD;  Location:  OR     LAPAROSCOPY, SURGICAL; CHOLECYSTECTOMY  01/01/1996    open procedure    HC REMOVAL OF TONSILS,12+ Y/O      age 21    HCL PAP SMEAR  01/01/1999    normal    LASER HOLMIUM LITHOTRIPSY URETER(S), INSERT STENT, COMBINED Bilateral 02/10/2025     Procedure: cystoscopy , bilateral ureteroscopy with holmium laser lithotripsy Right side ,bilateral ureteral stent placement;  Surgeon: Ceasar Delgado MD;  Location: [facility] OR    LASER HOLMIUM LITHOTRIPSY URETER(S), INSERT STENT, COMBINED Left 3/5/2025    Procedure: CYSTOSCOPY, WITH LEFT RETROGRADE PYELOGRAM,  LEFT URETEROSCOPY WITH HOLMIUM LASER LITHOTRIPSY , LEFT URETERAL STENT EXCHANGE, RIGHT STENT REMOVAL;  Surgeon: Ceasar Delgado MD;  Location: [facility] OR    RELEASE CARPAL TUNNEL  2015    VASCULAR SURGERY  1994    microvascular decompression of trigeminal nerve (MVD)    [facility] NONSPECIFIC PROCEDURE  1994    microvascular decompression of the right trigeminal nerve    [facility] VAGINAL HYSTERECTOMY  1983    menorhagia; both ovaries remain    [facility] COLONOSCOPY THRU STOMA, DIAGNOSTIC  2003    normal; diverticulosis       Family History   Adopted: Yes   Problem Relation Age of Onset    Multiple Sclerosis Daughter     Colon Cancer No family hx of     Breast Cancer No family hx of        Social History     Tobacco Use    Smoking status: Former     Current packs/day: 0.00     Average packs/day: 0.5 packs/day for 20.0 years (10.0 ttl pk-yrs)     Types: Cigarettes     Start date:      Quit date:      Years since quittin.3     Passive exposure: Never    Smokeless tobacco: Never   Substance Use Topics    Alcohol use: Yes     Alcohol/week: 1.0 - 2.0 standard drink of alcohol     Types: 1 - 2 Standard drinks or equivalent per week       Diet: eats what she wants   Physical Activity: walks everyday, has an elliptical, lifts weights, stretching  Depression Screen:    Over the past 2 weeks, patient has felt down, depressed, or hopeless:  No    Over the past 2 weeks, patient has felt little interest or pleasure in doing things: No    Functional ability/Safety screen:  Up and go test (able to get up and walk longer than 30 seconds): Passed  Patient needs assistance with: nothing, fully  "independent  Patient's home has the following possible safety concerns: none identified  Patient has concerns about her hearing:  Yes - hearing aids   Cognitive Screen  Patient repeats three objects (piyush, apple, table)      Clock drawing test:   NORMAL  Recalls three objects after 3 minutes (piyush, apple, table):                                                                                               recalls 3 objects (3 points)    Physical Exam:  BP (!) 140/74 (BP Location: Left arm, Patient Position: Sitting, Cuff Size: Adult Regular)   Pulse 65   Temp 97.3  F (36.3  C) (Temporal)   Ht 1.575 m (5' 2\")   Wt 51.4 kg (113 lb 6.4 oz)   SpO2 99%   BMI 20.74 kg/m     Body mass index is 20.74 kg/m .          End of Life Planning:   Patient currently has an advanced directive: No.  I have verified the patient's ablity to prepare an advanced directive/make health care decisions.  Literature was provided to assist patient in preparing an advanced directive.    Education/Counseling:   Based on review of the above information, the following items were addressed:      No concerns. BP borderline, but reasonable for 79 y/o.     Appropriate preventive services were discussed with this patient, including applicable screening as appropriate for cardiovascular disease, diabetes, osteopenia/osteoporosis, and glaucoma.  As appropriate for age/gender, discussed screening for colorectal cancer, prostate cancer, breast cancer, and cervical cancer.    Recommended RSV vaccine    Verena Freedman PA-C  4/17/2025               "

## 2025-04-18 ENCOUNTER — TRANSFERRED RECORDS (OUTPATIENT)
Dept: FAMILY MEDICINE | Facility: CLINIC | Age: 80
End: 2025-04-18

## 2025-04-18 LAB — RETINOPATHY: NORMAL

## 2025-05-15 ENCOUNTER — OFFICE VISIT (OUTPATIENT)
Dept: FAMILY MEDICINE | Facility: CLINIC | Age: 80
End: 2025-05-15

## 2025-05-15 VITALS
OXYGEN SATURATION: 98 % | TEMPERATURE: 97.2 F | SYSTOLIC BLOOD PRESSURE: 122 MMHG | DIASTOLIC BLOOD PRESSURE: 68 MMHG | WEIGHT: 115.8 LBS | BODY MASS INDEX: 21.18 KG/M2 | HEART RATE: 68 BPM

## 2025-05-15 DIAGNOSIS — N94.9 GENITAL LESION, FEMALE: ICD-10-CM

## 2025-05-15 DIAGNOSIS — L20.89 OTHER ATOPIC DERMATITIS: Primary | ICD-10-CM

## 2025-05-15 RX ORDER — VALACYCLOVIR HYDROCHLORIDE 1 G/1
1000 TABLET, FILM COATED ORAL 3 TIMES DAILY
Qty: 21 TABLET | Refills: 0 | Status: SHIPPED | OUTPATIENT
Start: 2025-05-15 | End: 2025-05-22

## 2025-05-15 RX ORDER — TRIAMCINOLONE ACETONIDE 1 MG/G
CREAM TOPICAL 2 TIMES DAILY
Qty: 30 G | Refills: 1 | Status: SHIPPED | OUTPATIENT
Start: 2025-05-15 | End: 2025-05-29

## 2025-05-15 NOTE — PATIENT INSTRUCTIONS
Atopic dermatitis:  Difficult to say whether red, scaly lesions on back are a form eczema versus possible psoriasis. Would need biopsy to confirm psoriasis. However, would like pt to do trial of topical steroid triamcinolone. Warned of possible side effects of topical steroid therapy, and advised to monitor closely for possibly permanent skin changes like thinning, whitening of the skin, and stop immediately if noted.     Also recommended avoid scratching. Try to avoid hot showers. Once daily, and especially after showering, apply good quality moisturizer to whole back- Cereve, Eucerin, Vanicream, or Aveeno.     Ideally can stop triamcinolone after 14 days, and just maintain with general moisturizer. If this is not working, contact me and I would submit dermatology referral for possibly biopsy.     Vaginal lesions:  Question possible herpes infection versus shingles infection. Collected herpes swab today which will result next week. Take valacyclovir 1 tablet 3 times daily for 7 days, but may change this one swab result returns. Okay to take with or without food. Contact our clinic with side effects.

## 2025-05-15 NOTE — NURSING NOTE
Chief Complaint   Patient presents with    Wound Check     Back sores have been going on for several years. Vaginal sores was noticed about a week ago. Back sores are itchy but not painful. Vaginal sores are red and has fluid, puss colored, coming out of it.      Pre-visit Screening:  Immunizations:  up to date  Colonoscopy:  NA  Mammogram: is up to date  Asthma Action Test/Plan:  NA  PHQ9:  NA  GAD7:  NA  Questioned patient about current smoking habits Pt. quit smoking some time ago.  Ok to leave detailed message on voice mail for today's visit only Yes, phone # [phone] (home)

## 2025-05-15 NOTE — PROGRESS NOTES
CC: Chronic Back sores, acute vaginal sores    History:  Back sores:  Has been having sores on back for the past several years. Itchy. No discharge. Thinks she has been to [facility] dermatology in the past and did a light treatment once but then stopped.     Vaginal sores:  For the past 1 week, pt has been noticed vaginal sores. There are several lesions. Red. Mainly itchy, but not painful. These have been having pus/fluid coming out.     PMH, MEDICATIONS, ALLERGIES, SOCIAL AND FAMILY HISTORY in [facility] and reviewed by me personally.      ROS negative other than the symptoms noted above in the HPI.        Examination   /68 (BP Location: Left arm, Patient Position: Sitting, Cuff Size: Adult Regular)   Pulse 68   Temp 97.2  F (36.2  C) (Temporal)   Wt 52.5 kg (115 lb 12.8 oz)   SpO2 98%   BMI 21.18 kg/m         Constitutional: Sitting comfortably, in no acute distress. Vital signs noted  Neck:  no adenopathy, trachea midline and normal to palpation  Cardiovascular:  regular rate and rhythm, no murmurs, clicks, or gallops  Respiratory:  normal respiratory rate and rhythm, lungs clear to auscultation  : External genitalia with erythematous vesicle of anterior right labia, ulceration of right labia minora 2 cm in diameter, and coalescing of unroofed erythematous vesicles on right medial thigh.   SKIN: No jaundice/pallor. Erythematous scaling papules across mid and upper back.    Psychiatric: mentation appears normal and affect normal/bright        A/P    ICD-10-CM    1. Other atopic dermatitis  L20.89 triamcinolone (KENALOG) 0.1 % external cream      2. Genital lesion, female  N94.9 valACYclovir (VALTREX) 1000 mg tablet     Herpes Simplex Virus Culture ([facility])**Refrig**          DISCUSSION:  Atopic dermatitis:  Difficult to say whether red, scaly lesions on back are a form of eczema versus possible guttate psoriasis. Would need biopsy to confirm psoriasis. However, would like pt to do trial of topical steroid  triamcinolone, which could benefit either diagnosis. Warned of possible side effects of topical steroid therapy, and advised to monitor closely for possibly permanent skin changes like thinning, whitening of the skin, and stop immediately if noted. Use for up to 14 days.    Also recommended avoid scratching. Try to avoid hot showers. Once daily, and especially after showering, apply good quality moisturizer to whole back- Cereve, Eucerin, Vanicream, or Aveeno.     Ideally can stop triamcinolone after 14 days, and just maintain with general moisturizer. If this is not working, contact me and I would submit dermatology referral for possibly biopsy.     Vaginal lesions:  Question possible herpes infection versus shingles infection. Collected herpes (HSV) swab today which will result next week. Take valacyclovir 1 tablet 3 times daily (covered for HZV and HSV while swab pending) for 7 days, but may change this once swab result returns. Okay to take with or without food. Contact our clinic with side effects.    follow up visit: As needed    Verena Freedman PA-C  [facility]

## 2025-05-22 LAB
HSV CULTURE - QUEST: ABNORMAL
SOURCE: ABNORMAL

## 2025-05-23 ENCOUNTER — RESULTS FOLLOW-UP (OUTPATIENT)
Dept: FAMILY MEDICINE | Facility: CLINIC | Age: 80
End: 2025-05-23

## 2025-08-01 ENCOUNTER — ANCILLARY PROCEDURE (OUTPATIENT)
Dept: GENERAL RADIOLOGY | Facility: CLINIC | Age: 80
End: 2025-08-01
Attending: FAMILY MEDICINE
Payer: COMMERCIAL

## 2025-08-01 DIAGNOSIS — R07.81 RIB PAIN ON RIGHT SIDE: ICD-10-CM

## 2025-08-01 DIAGNOSIS — M25.532 LEFT WRIST PAIN: ICD-10-CM

## 2025-08-01 DIAGNOSIS — M79.645 PAIN OF FINGER OF LEFT HAND: ICD-10-CM

## 2025-08-01 PROCEDURE — 73130 X-RAY EXAM OF HAND: CPT | Mod: TC | Performed by: STUDENT IN AN ORGANIZED HEALTH CARE EDUCATION/TRAINING PROGRAM

## 2025-08-01 PROCEDURE — 71101 X-RAY EXAM UNILAT RIBS/CHEST: CPT | Mod: TC | Performed by: STUDENT IN AN ORGANIZED HEALTH CARE EDUCATION/TRAINING PROGRAM

## 2025-08-05 ENCOUNTER — OFFICE VISIT (OUTPATIENT)
Dept: ORTHOPEDICS | Facility: CLINIC | Age: 80
End: 2025-08-05
Attending: FAMILY MEDICINE
Payer: COMMERCIAL

## 2025-08-15 SDOH — HEALTH STABILITY: PHYSICAL HEALTH: ON AVERAGE, HOW MANY DAYS PER WEEK DO YOU ENGAGE IN MODERATE TO STRENUOUS EXERCISE (LIKE A BRISK WALK)?: 6 DAYS

## 2025-08-15 SDOH — HEALTH STABILITY: PHYSICAL HEALTH: ON AVERAGE, HOW MANY MINUTES DO YOU ENGAGE IN EXERCISE AT THIS LEVEL?: 30 MIN

## 2025-08-19 ENCOUNTER — ANCILLARY PROCEDURE (OUTPATIENT)
Dept: GENERAL RADIOLOGY | Facility: CLINIC | Age: 80
End: 2025-08-19
Attending: STUDENT IN AN ORGANIZED HEALTH CARE EDUCATION/TRAINING PROGRAM
Payer: COMMERCIAL

## 2025-08-19 ENCOUNTER — OFFICE VISIT (OUTPATIENT)
Dept: ORTHOPEDICS | Facility: CLINIC | Age: 80
End: 2025-08-19
Payer: COMMERCIAL

## 2025-08-19 DIAGNOSIS — S62.645A CLOSED NONDISPLACED FRACTURE OF PROXIMAL PHALANX OF LEFT RING FINGER, INITIAL ENCOUNTER: ICD-10-CM

## 2025-08-19 DIAGNOSIS — S52.502D: ICD-10-CM

## 2025-08-19 DIAGNOSIS — S52.615D CLOSED NONDISPLACED FRACTURE OF STYLOID PROCESS OF LEFT ULNA WITH ROUTINE HEALING, SUBSEQUENT ENCOUNTER: ICD-10-CM

## 2025-08-19 DIAGNOSIS — S52.615A CLOSED NONDISPLACED FRACTURE OF STYLOID PROCESS OF LEFT ULNA, INITIAL ENCOUNTER: ICD-10-CM

## 2025-08-19 DIAGNOSIS — S62.645D CLOSED NONDISPLACED FRACTURE OF PROXIMAL PHALANX OF LEFT RING FINGER WITH ROUTINE HEALING, SUBSEQUENT ENCOUNTER: Primary | ICD-10-CM

## 2025-08-19 DIAGNOSIS — S52.502A TRAUMATIC CLOSED NONDISPLACED FRACTURE OF DISTAL END OF RADIUS, LEFT, INITIAL ENCOUNTER: ICD-10-CM

## 2025-08-19 PROCEDURE — 73110 X-RAY EXAM OF WRIST: CPT | Mod: TC | Performed by: RADIOLOGY

## 2025-08-19 PROCEDURE — 29075 APPL CST ELBW FNGR SHORT ARM: CPT | Mod: LT | Performed by: STUDENT IN AN ORGANIZED HEALTH CARE EDUCATION/TRAINING PROGRAM

## 2025-08-19 PROCEDURE — 99213 OFFICE O/P EST LOW 20 MIN: CPT | Mod: 25 | Performed by: STUDENT IN AN ORGANIZED HEALTH CARE EDUCATION/TRAINING PROGRAM

## 2025-08-19 PROCEDURE — 73130 X-RAY EXAM OF HAND: CPT | Mod: TC | Performed by: RADIOLOGY

## 2025-09-01 SDOH — HEALTH STABILITY: PHYSICAL HEALTH: ON AVERAGE, HOW MANY MINUTES DO YOU ENGAGE IN EXERCISE AT THIS LEVEL?: 20 MIN

## 2025-09-01 SDOH — HEALTH STABILITY: PHYSICAL HEALTH: ON AVERAGE, HOW MANY DAYS PER WEEK DO YOU ENGAGE IN MODERATE TO STRENUOUS EXERCISE (LIKE A BRISK WALK)?: 6 DAYS

## 2025-09-02 ENCOUNTER — ANCILLARY PROCEDURE (OUTPATIENT)
Dept: GENERAL RADIOLOGY | Facility: CLINIC | Age: 80
End: 2025-09-02
Attending: STUDENT IN AN ORGANIZED HEALTH CARE EDUCATION/TRAINING PROGRAM
Payer: COMMERCIAL

## 2025-09-02 ENCOUNTER — OFFICE VISIT (OUTPATIENT)
Dept: ORTHOPEDICS | Facility: CLINIC | Age: 80
End: 2025-09-02
Payer: COMMERCIAL

## 2025-09-02 DIAGNOSIS — S52.502D: ICD-10-CM

## 2025-09-02 DIAGNOSIS — S52.615D CLOSED NONDISPLACED FRACTURE OF STYLOID PROCESS OF LEFT ULNA WITH ROUTINE HEALING, SUBSEQUENT ENCOUNTER: ICD-10-CM

## 2025-09-02 DIAGNOSIS — S62.645D CLOSED NONDISPLACED FRACTURE OF PROXIMAL PHALANX OF LEFT RING FINGER WITH ROUTINE HEALING, SUBSEQUENT ENCOUNTER: ICD-10-CM

## 2025-09-02 DIAGNOSIS — S62.645D CLOSED NONDISPLACED FRACTURE OF PROXIMAL PHALANX OF LEFT RING FINGER WITH ROUTINE HEALING, SUBSEQUENT ENCOUNTER: Primary | ICD-10-CM

## 2025-09-02 PROCEDURE — 29075 APPL CST ELBW FNGR SHORT ARM: CPT | Mod: LT | Performed by: STUDENT IN AN ORGANIZED HEALTH CARE EDUCATION/TRAINING PROGRAM

## 2025-09-02 PROCEDURE — 73110 X-RAY EXAM OF WRIST: CPT | Mod: TC | Performed by: RADIOLOGY

## 2025-09-02 PROCEDURE — 73130 X-RAY EXAM OF HAND: CPT | Mod: TC | Performed by: RADIOLOGY

## 2025-09-02 PROCEDURE — 99213 OFFICE O/P EST LOW 20 MIN: CPT | Mod: 25 | Performed by: STUDENT IN AN ORGANIZED HEALTH CARE EDUCATION/TRAINING PROGRAM

## (undated) DEVICE — CATH URETERAL OPEN END 6FR AXXCESS

## (undated) DEVICE — SUCTION MANIFOLD NEPTUNE 2 SYS 4 PORT 0702-020-000

## (undated) DEVICE — SHEATH URETERAL ACCESS NAVIGATOR 13/15FRX36CM 250-209

## (undated) DEVICE — GUIDEWIRE ZIPWIRE NITINOL STR 0.035"X150CM M0066802050

## (undated) DEVICE — URETEROSCOPE FLEX SLG USE 7.7FR LITHOVUE M0067913500

## (undated) DEVICE — SOL WATER IRRIG 3000ML BAG 2B7117

## (undated) DEVICE — ENDO SEAL BX PORT BPS-A

## (undated) DEVICE — SYR 20ML LL W/O NDL 302830

## (undated) DEVICE — PACK TUR CUSTOM SBA15RUFSE

## (undated) DEVICE — BAG DRAIN URO FOR SIEMENS 8MM ADAPTER NS CC164NS-A

## (undated) DEVICE — SHEATH URETERAL ACCESS NAVIGATOR HD 11/13FRX36CM M0062502220

## (undated) DEVICE — SOLUTION IV IRRIGATION 0.9% NACL 1000ML R5200-01

## (undated) DEVICE — GUIDEWIRE ZIPWIRE ANG STIFF .035"X150CM M006630223B0

## (undated) DEVICE — LINEN TOWEL PACK X5 5464

## (undated) DEVICE — LASER FIBER HOLMIUM MOSES 200 D/F/L AC-10030100

## (undated) DEVICE — PAD CHUX UNDERPAD 23X24" 7136

## (undated) DEVICE — RAD RX ISOVUE 300 (50ML) 61% IOPAMIDOL CHARGE PER ML

## (undated) RX ORDER — GLYCOPYRROLATE 0.2 MG/ML
INJECTION, SOLUTION INTRAMUSCULAR; INTRAVENOUS
Status: DISPENSED
Start: 2025-02-10

## (undated) RX ORDER — ATROPA BELLADONNA AND OPIUM 16.2; 3 MG/1; MG/1
SUPPOSITORY RECTAL
Status: DISPENSED
Start: 2025-02-10

## (undated) RX ORDER — DEXAMETHASONE SODIUM PHOSPHATE 4 MG/ML
INJECTION, SOLUTION INTRA-ARTICULAR; INTRALESIONAL; INTRAMUSCULAR; INTRAVENOUS; SOFT TISSUE
Status: DISPENSED
Start: 2025-03-05

## (undated) RX ORDER — CEFAZOLIN SODIUM/WATER 2 G/20 ML
SYRINGE (ML) INTRAVENOUS
Status: DISPENSED
Start: 2025-03-05

## (undated) RX ORDER — GLYCOPYRROLATE 0.2 MG/ML
INJECTION, SOLUTION INTRAMUSCULAR; INTRAVENOUS
Status: DISPENSED
Start: 2025-03-05

## (undated) RX ORDER — FENTANYL CITRATE 50 UG/ML
INJECTION, SOLUTION INTRAMUSCULAR; INTRAVENOUS
Status: DISPENSED
Start: 2025-03-05

## (undated) RX ORDER — ACETAMINOPHEN 325 MG/1
TABLET ORAL
Status: DISPENSED
Start: 2025-02-10

## (undated) RX ORDER — FENTANYL CITRATE 50 UG/ML
INJECTION, SOLUTION INTRAMUSCULAR; INTRAVENOUS
Status: DISPENSED
Start: 2025-02-10

## (undated) RX ORDER — PROPOFOL 10 MG/ML
INJECTION, EMULSION INTRAVENOUS
Status: DISPENSED
Start: 2025-03-05

## (undated) RX ORDER — ONDANSETRON 2 MG/ML
INJECTION INTRAMUSCULAR; INTRAVENOUS
Status: DISPENSED
Start: 2025-03-05

## (undated) RX ORDER — ATROPA BELLADONNA AND OPIUM 16.2; 3 MG/1; MG/1
SUPPOSITORY RECTAL
Status: DISPENSED
Start: 2025-03-05

## (undated) RX ORDER — ACETAMINOPHEN 325 MG/1
TABLET ORAL
Status: DISPENSED
Start: 2025-03-05